# Patient Record
Sex: FEMALE | Race: BLACK OR AFRICAN AMERICAN | NOT HISPANIC OR LATINO | Employment: OTHER | URBAN - METROPOLITAN AREA
[De-identification: names, ages, dates, MRNs, and addresses within clinical notes are randomized per-mention and may not be internally consistent; named-entity substitution may affect disease eponyms.]

---

## 2023-05-19 ENCOUNTER — OFFICE VISIT (OUTPATIENT)
Dept: PRIMARY CARE | Facility: CLINIC | Age: 73
End: 2023-05-19
Payer: MEDICARE

## 2023-05-19 VITALS
BODY MASS INDEX: 34.86 KG/M2 | HEART RATE: 84 BPM | OXYGEN SATURATION: 96 % | DIASTOLIC BLOOD PRESSURE: 84 MMHG | WEIGHT: 216 LBS | SYSTOLIC BLOOD PRESSURE: 142 MMHG | RESPIRATION RATE: 17 BRPM | TEMPERATURE: 97.8 F

## 2023-05-19 DIAGNOSIS — Z96.651 STATUS POST RIGHT KNEE REPLACEMENT: ICD-10-CM

## 2023-05-19 DIAGNOSIS — I10 BENIGN ESSENTIAL HYPERTENSION: ICD-10-CM

## 2023-05-19 DIAGNOSIS — Z86.73 HISTORY OF TIA (TRANSIENT ISCHEMIC ATTACK): ICD-10-CM

## 2023-05-19 DIAGNOSIS — M54.50 ACUTE RIGHT-SIDED LOW BACK PAIN WITHOUT SCIATICA: ICD-10-CM

## 2023-05-19 DIAGNOSIS — M06.9 RHEUMATOID ARTHRITIS, INVOLVING UNSPECIFIED SITE, UNSPECIFIED WHETHER RHEUMATOID FACTOR PRESENT (MULTI): Primary | ICD-10-CM

## 2023-05-19 PROBLEM — Z86.010 HISTORY OF COLONIC POLYPS: Status: ACTIVE | Noted: 2021-11-12

## 2023-05-19 PROBLEM — M25.562 PAIN IN BOTH KNEES: Status: ACTIVE | Noted: 2023-05-19

## 2023-05-19 PROBLEM — E55.9 VITAMIN D DEFICIENCY: Status: ACTIVE | Noted: 2021-06-14

## 2023-05-19 PROBLEM — I25.10 ATHEROSCLEROSIS OF CORONARY ARTERY: Status: ACTIVE | Noted: 2023-05-19

## 2023-05-19 PROBLEM — G50.0 TRIGEMINAL NEURALGIA: Status: ACTIVE | Noted: 2023-05-19

## 2023-05-19 PROBLEM — M19.90 OSTEOARTHRITIS: Status: ACTIVE | Noted: 2021-06-11

## 2023-05-19 PROBLEM — M47.22 CERVICAL SPONDYLOSIS WITH RADICULOPATHY: Status: ACTIVE | Noted: 2023-05-19

## 2023-05-19 PROBLEM — I34.1 MVP (MITRAL VALVE PROLAPSE): Status: ACTIVE | Noted: 2023-05-19

## 2023-05-19 PROBLEM — K21.9 GERD (GASTROESOPHAGEAL REFLUX DISEASE): Status: ACTIVE | Noted: 2023-05-19

## 2023-05-19 PROBLEM — R07.89 ATYPICAL CHEST PAIN: Status: RESOLVED | Noted: 2023-05-19 | Resolved: 2023-05-19

## 2023-05-19 PROBLEM — M25.561 PAIN IN BOTH KNEES: Status: ACTIVE | Noted: 2023-05-19

## 2023-05-19 PROBLEM — M18.12 PRIMARY OSTEOARTHRITIS OF FIRST CARPOMETACARPAL JOINT OF LEFT HAND: Status: RESOLVED | Noted: 2018-02-15 | Resolved: 2023-05-19

## 2023-05-19 PROBLEM — G47.30 SLEEP APNEA: Status: ACTIVE | Noted: 2023-05-19

## 2023-05-19 PROBLEM — Z96.612 STATUS POST REVERSE TOTAL REPLACEMENT OF LEFT SHOULDER: Status: ACTIVE | Noted: 2020-06-15

## 2023-05-19 PROBLEM — Z86.0100 HISTORY OF COLONIC POLYPS: Status: ACTIVE | Noted: 2021-11-12

## 2023-05-19 PROBLEM — R06.2 WHEEZING: Status: RESOLVED | Noted: 2021-06-11 | Resolved: 2023-05-19

## 2023-05-19 PROBLEM — E78.2 MIXED HYPERLIPIDEMIA: Status: ACTIVE | Noted: 2022-09-25

## 2023-05-19 PROBLEM — G90.50 RSD (REFLEX SYMPATHETIC DYSTROPHY): Status: RESOLVED | Noted: 2023-05-19 | Resolved: 2023-05-19

## 2023-05-19 PROCEDURE — 1036F TOBACCO NON-USER: CPT | Performed by: FAMILY MEDICINE

## 2023-05-19 PROCEDURE — 3079F DIAST BP 80-89 MM HG: CPT | Performed by: FAMILY MEDICINE

## 2023-05-19 PROCEDURE — 3077F SYST BP >= 140 MM HG: CPT | Performed by: FAMILY MEDICINE

## 2023-05-19 PROCEDURE — 99214 OFFICE O/P EST MOD 30 MIN: CPT | Performed by: FAMILY MEDICINE

## 2023-05-19 RX ORDER — ALBUTEROL SULFATE 90 UG/1
2 AEROSOL, METERED RESPIRATORY (INHALATION)
COMMUNITY
Start: 2020-07-17

## 2023-05-19 RX ORDER — METOPROLOL TARTRATE 50 MG/1
50 TABLET ORAL 2 TIMES DAILY
COMMUNITY
Start: 2022-07-12 | End: 2023-10-23 | Stop reason: ALTCHOICE

## 2023-05-19 RX ORDER — ATORVASTATIN CALCIUM 40 MG/1
40 TABLET, FILM COATED ORAL DAILY
COMMUNITY
Start: 2022-06-03 | End: 2024-01-30 | Stop reason: SDUPTHER

## 2023-05-19 RX ORDER — LOSARTAN POTASSIUM 100 MG/1
100 TABLET ORAL DAILY
COMMUNITY
Start: 2019-10-15 | End: 2023-08-28 | Stop reason: SDUPTHER

## 2023-05-19 RX ORDER — FOLIC ACID 1 MG/1
1 TABLET ORAL DAILY
COMMUNITY

## 2023-05-19 RX ORDER — TIZANIDINE 2 MG/1
2 TABLET ORAL EVERY 6 HOURS PRN
COMMUNITY
Start: 2022-10-05 | End: 2023-10-23 | Stop reason: ALTCHOICE

## 2023-05-19 RX ORDER — HYDROXYCHLOROQUINE SULFATE 200 MG/1
1 TABLET, FILM COATED ORAL 2 TIMES DAILY
COMMUNITY
Start: 2022-05-17

## 2023-05-19 RX ORDER — CYCLOBENZAPRINE HCL 5 MG
5 TABLET ORAL NIGHTLY PRN
Qty: 10 TABLET | Refills: 0 | Status: SHIPPED | OUTPATIENT
Start: 2023-05-19 | End: 2023-10-23 | Stop reason: ALTCHOICE

## 2023-05-19 RX ORDER — METHOTREXATE 2.5 MG/1
2.5 TABLET ORAL
COMMUNITY

## 2023-05-19 RX ORDER — CARISOPRODOL 350 MG/1
350 TABLET ORAL 3 TIMES DAILY PRN
COMMUNITY
End: 2023-10-23 | Stop reason: ALTCHOICE

## 2023-05-19 RX ORDER — ASPIRIN 325 MG
50000 TABLET, DELAYED RELEASE (ENTERIC COATED) ORAL
COMMUNITY

## 2023-05-19 RX ORDER — NAPROXEN SODIUM 220 MG/1
81 TABLET, FILM COATED ORAL
COMMUNITY
Start: 2022-08-02

## 2023-05-19 RX ORDER — CELECOXIB 200 MG/1
200 CAPSULE ORAL DAILY
COMMUNITY
Start: 2021-11-23 | End: 2023-05-19 | Stop reason: SDUPTHER

## 2023-05-19 RX ORDER — MELOXICAM 15 MG/1
1 TABLET ORAL DAILY PRN
COMMUNITY
Start: 2022-07-15 | End: 2023-10-23 | Stop reason: ALTCHOICE

## 2023-05-19 RX ORDER — HYDROCHLOROTHIAZIDE 25 MG/1
25 TABLET ORAL DAILY
COMMUNITY
End: 2023-10-23 | Stop reason: SDUPTHER

## 2023-05-19 RX ORDER — GABAPENTIN 600 MG/1
600 TABLET ORAL
COMMUNITY
Start: 2019-06-12

## 2023-05-19 RX ORDER — CELECOXIB 200 MG/1
200 CAPSULE ORAL DAILY
Qty: 90 CAPSULE | Refills: 0 | Status: SHIPPED | OUTPATIENT
Start: 2023-05-19 | End: 2023-08-17

## 2023-05-19 RX ORDER — HYDROCODONE BITARTRATE AND ACETAMINOPHEN 5; 325 MG/1; MG/1
1 TABLET ORAL DAILY
COMMUNITY
Start: 2022-10-05 | End: 2023-10-23 | Stop reason: ALTCHOICE

## 2023-05-19 RX ORDER — KETOCONAZOLE 20 MG/G
CREAM TOPICAL 2 TIMES DAILY
COMMUNITY
End: 2023-10-23 | Stop reason: ALTCHOICE

## 2023-05-19 ASSESSMENT — ENCOUNTER SYMPTOMS
ARTHRALGIAS: 1
SPEECH DIFFICULTY: 0
NUMBNESS: 0
DIZZINESS: 0
FEVER: 0
SHORTNESS OF BREATH: 0
BACK PAIN: 1
CHILLS: 0

## 2023-05-19 NOTE — ASSESSMENT & PLAN NOTE
Patient will continue with physical therapy in [place] where her surgery took place.  Will request hospital records.

## 2023-05-19 NOTE — ASSESSMENT & PLAN NOTE
Likely 2/2 muscular strain.  Rx sent for muscle relaxer.  Follow up in 4-6 weeks or sooner as needed.  Discussed signs / symptoms that warrant presentation to the ED.

## 2023-05-19 NOTE — PROGRESS NOTES
I saw and evaluated the patient. I personally obtained the key and critical portions of the history and physical exam or was physically present for key and critical portions performed by the resident/fellow. I reviewed the resident/fellow's documentation and discussed the patient with the resident/fellow. I agree with the resident/fellow's medical decision making as documented in the note.  Patient states she was in [place] for her knee surgery.  Seen with her son.  She never did get to do physical therapy after his surgery.  Patient states while she is there she also had mini stroke/TIA.  She states she did not have a follow-up however they did all kinds of testing.  I kept her on Plavix for 20 days and then stopped she believes.  Patient's had no symptoms since.  They did not give her calls for.  Patient states he did a whole bunch of tests that they did not mention anything or follow-up with her on.  Waiting to get those records may need further testing.  She has seen neurology.  She needs to start physical therapy.  Heading back to [place] in 3 days.  Patient also states she was picking some up when she strained her back.  Like a muscle spasm.  No numbness tingling no bowel bladder dysfunction no weakness.  Patient states her muscles very tight and tender.  On exam neurovascular intact, tight paraspinal muscle.  Full range of motion.  Strength 5 out of 5.  We will treat her for muscle spasm.  Patient also not sleeping so we will try muscle relaxer.  Patient was on Soma.  Patient aware if any chest pain shortness of breath any nausea vomiting diarrhea fever headache any saddle anesthesia any bowel bladder dysfunction any numbness or weight please go to the ER  Side effects of medication explained to the patient  Follow-up in 1 to 2 weeks  Agree with assessment and plan  Jose Angel Ragland, DO

## 2023-05-19 NOTE — ASSESSMENT & PLAN NOTE
Per patient report, was hospitalized in [place] for TIA.   S/p 20 days plavix therapy.  Will continue strict monitoring of BP and lipids. Referral placed to neurology.  Discussed signs / symptoms that warrant presentation to the ED.  Will request outside hospital records.

## 2023-05-19 NOTE — PROGRESS NOTES
Subjective   Patient ID: Anita Calabrese is a 72 y.o. female who presents for Follow-up.    HPI     Had a right knee replacement in [place] March 14. States she also had a TIA while she was in [place] for which she was hospitalized last month. Took clopidogrel for 20 days and was advised to discontinue.  Feels she has been slow to recover from her surgery.  Will be going back to [place] this week to complete therapy for her knee.    Today, notes she is experiencing acute low back pain. Pain was sudden onset when she got to the office. States it is radiating up her back. Feels she is having muscle spasms.  Has to keep changing positions to get comfortable. No loss of bowel or bladder control.    Checks her blood pressure at home noting it is usually well controlled unless she is in pain.     Review of Systems   Constitutional:  Negative for chills and fever.   Respiratory:  Negative for shortness of breath.    Cardiovascular:  Negative for chest pain.   Musculoskeletal:  Positive for arthralgias and back pain.   Neurological:  Negative for dizziness, speech difficulty and numbness.   All other systems reviewed and are negative.      Objective   /84 (BP Location: Right arm, Patient Position: Sitting, BP Cuff Size: Large adult)   Pulse 84   Temp 36.6 °C (97.8 °F)   Resp 17   Wt 98 kg (216 lb)   SpO2 96%   BMI 34.86 kg/m²     Physical Exam  Vitals and nursing note reviewed.   Constitutional:       Appearance: She is not toxic-appearing.   Eyes:      Conjunctiva/sclera: Conjunctivae normal.   Cardiovascular:      Rate and Rhythm: Normal rate and regular rhythm.   Pulmonary:      Effort: Pulmonary effort is normal.      Breath sounds: Normal breath sounds.   Musculoskeletal:      Lumbar back: Tenderness (right lumbar paraspinals) present.      Comments: Ambulating with cane. Right knee effusion s/p replacement.   Skin:     General: Skin is warm and dry.   Neurological:      General: No focal deficit present.       Mental Status: She is alert.   Psychiatric:         Mood and Affect: Mood normal.         Assessment/Plan   Problem List Items Addressed This Visit          Circulatory    Benign essential hypertension     BP elevated in office today. Likely 2/2 acute back pain.  No chest pain, shortness of breath, headache, vision disturbance.  Continue to check BP at home. Call the office for elevated readings.  Continue medications are currently prescribed.  Discussed signs / symptoms that warrant presentation to the ED.            Other    Rheumatoid arthritis, involving unspecified site, unspecified whether rheumatoid factor present (CMS/[facility]) - Primary    Relevant Medications    CeleBREX 200 mg capsule    Acute right-sided low back pain without sciatica     Likely 2/2 muscular strain.  Rx sent for muscle relaxer.  Follow up in 4-6 weeks or sooner as needed.  Discussed signs / symptoms that warrant presentation to the ED.         Relevant Medications    cyclobenzaprine (Flexeril) 5 mg tablet    Status post right knee replacement     Patient will continue with physical therapy in [place] where her surgery took place.  Will request hospital records.         History of TIA (transient ischemic attack)     Per patient report, was hospitalized in [place] for TIA.   S/p 20 days plavix therapy.  Will continue strict monitoring of BP and lipids. Referral placed to neurology.  Discussed signs / symptoms that warrant presentation to the ED.  Will request outside hospital records.         Relevant Orders    Referral to Neurology     Discussed with attending physician.    Miriam Demarco,   PGY3

## 2023-05-19 NOTE — ASSESSMENT & PLAN NOTE
BP elevated in office today. Likely 2/2 acute back pain.  No chest pain, shortness of breath, headache, vision disturbance.  Continue to check BP at home. Call the office for elevated readings.  Continue medications are currently prescribed.  Discussed signs / symptoms that warrant presentation to the ED.

## 2023-05-26 ENCOUNTER — TELEPHONE (OUTPATIENT)
Dept: PRIMARY CARE | Facility: CLINIC | Age: 73
End: 2023-05-26

## 2023-05-26 NOTE — TELEPHONE ENCOUNTER
----- Message from Jose Angel Ragland DO sent at 5/26/2023 10:58 AM EDT -----  I received patient's records from her stay in [place].  Please assure she has a follow-up in the next few weeks

## 2023-08-28 DIAGNOSIS — I10 BENIGN ESSENTIAL HYPERTENSION: Primary | ICD-10-CM

## 2023-08-28 RX ORDER — LOSARTAN POTASSIUM 100 MG/1
100 TABLET ORAL DAILY
Qty: 90 TABLET | Refills: 1 | Status: SHIPPED | OUTPATIENT
Start: 2023-08-28 | End: 2024-03-26 | Stop reason: SDUPTHER

## 2023-10-23 ENCOUNTER — APPOINTMENT (OUTPATIENT)
Dept: RADIOLOGY | Facility: HOSPITAL | Age: 73
End: 2023-10-23
Payer: MEDICARE

## 2023-10-23 ENCOUNTER — HOSPITAL ENCOUNTER (EMERGENCY)
Facility: HOSPITAL | Age: 73
Discharge: HOME | End: 2023-10-23
Attending: STUDENT IN AN ORGANIZED HEALTH CARE EDUCATION/TRAINING PROGRAM
Payer: MEDICARE

## 2023-10-23 ENCOUNTER — OFFICE VISIT (OUTPATIENT)
Dept: PRIMARY CARE | Facility: CLINIC | Age: 73
End: 2023-10-23
Payer: MEDICARE

## 2023-10-23 VITALS
RESPIRATION RATE: 18 BRPM | DIASTOLIC BLOOD PRESSURE: 82 MMHG | TEMPERATURE: 94 F | BODY MASS INDEX: 36.57 KG/M2 | OXYGEN SATURATION: 94 % | HEIGHT: 67 IN | HEART RATE: 102 BPM | SYSTOLIC BLOOD PRESSURE: 152 MMHG | WEIGHT: 233 LBS

## 2023-10-23 VITALS
BODY MASS INDEX: 36.96 KG/M2 | RESPIRATION RATE: 18 BRPM | SYSTOLIC BLOOD PRESSURE: 168 MMHG | WEIGHT: 230 LBS | DIASTOLIC BLOOD PRESSURE: 85 MMHG | HEIGHT: 66 IN | OXYGEN SATURATION: 97 % | TEMPERATURE: 98.4 F | HEART RATE: 83 BPM

## 2023-10-23 DIAGNOSIS — I10 BENIGN ESSENTIAL HYPERTENSION: ICD-10-CM

## 2023-10-23 DIAGNOSIS — J18.9 PNEUMONIA DUE TO INFECTIOUS ORGANISM, UNSPECIFIED LATERALITY, UNSPECIFIED PART OF LUNG: Primary | ICD-10-CM

## 2023-10-23 DIAGNOSIS — Z86.73 HISTORY OF TIA (TRANSIENT ISCHEMIC ATTACK): ICD-10-CM

## 2023-10-23 DIAGNOSIS — N30.01 ACUTE CYSTITIS WITH HEMATURIA: ICD-10-CM

## 2023-10-23 DIAGNOSIS — R42 DIZZINESS: Primary | ICD-10-CM

## 2023-10-23 LAB
ALBUMIN SERPL BCP-MCNC: 4 G/DL (ref 3.4–5)
ALP SERPL-CCNC: 73 U/L (ref 33–136)
ALT SERPL W P-5'-P-CCNC: 18 U/L (ref 7–45)
ANION GAP SERPL CALC-SCNC: 10 MMOL/L (ref 10–20)
APPEARANCE UR: ABNORMAL
AST SERPL W P-5'-P-CCNC: 18 U/L (ref 9–39)
BASOPHILS # BLD AUTO: 0.06 X10*3/UL (ref 0–0.1)
BASOPHILS NFR BLD AUTO: 0.8 %
BILIRUB SERPL-MCNC: 0.4 MG/DL (ref 0–1.2)
BILIRUB UR STRIP.AUTO-MCNC: NEGATIVE MG/DL
BNP SERPL-MCNC: 25 PG/ML (ref 0–99)
BUN SERPL-MCNC: 25 MG/DL (ref 6–23)
CALCIUM SERPL-MCNC: 9.3 MG/DL (ref 8.6–10.3)
CARDIAC TROPONIN I PNL SERPL HS: 8 NG/L (ref 0–13)
CHLORIDE SERPL-SCNC: 106 MMOL/L (ref 98–107)
CO2 SERPL-SCNC: 29 MMOL/L (ref 21–32)
COLOR UR: YELLOW
CREAT SERPL-MCNC: 1.01 MG/DL (ref 0.5–1.05)
EOSINOPHIL # BLD AUTO: 0.11 X10*3/UL (ref 0–0.4)
EOSINOPHIL NFR BLD AUTO: 1.6 %
ERYTHROCYTE [DISTWIDTH] IN BLOOD BY AUTOMATED COUNT: 14.7 % (ref 11.5–14.5)
GFR SERPL CREATININE-BSD FRML MDRD: 58 ML/MIN/1.73M*2
GLUCOSE SERPL-MCNC: 89 MG/DL (ref 74–99)
GLUCOSE UR STRIP.AUTO-MCNC: NEGATIVE MG/DL
HCT VFR BLD AUTO: 37.9 % (ref 36–46)
HGB BLD-MCNC: 12 G/DL (ref 12–16)
HOLD SPECIMEN: NORMAL
IMM GRANULOCYTES # BLD AUTO: 0.08 X10*3/UL (ref 0–0.5)
IMM GRANULOCYTES NFR BLD AUTO: 1.1 % (ref 0–0.9)
INR PPP: 1 (ref 0.9–1.1)
KETONES UR STRIP.AUTO-MCNC: NEGATIVE MG/DL
LACTATE SERPL-SCNC: 0.6 MMOL/L (ref 0.4–2)
LEUKOCYTE ESTERASE UR QL STRIP.AUTO: ABNORMAL
LYMPHOCYTES # BLD AUTO: 2.19 X10*3/UL (ref 0.8–3)
LYMPHOCYTES NFR BLD AUTO: 31 %
MAGNESIUM SERPL-MCNC: 1.51 MG/DL (ref 1.6–2.4)
MCH RBC QN AUTO: 31.2 PG (ref 26–34)
MCHC RBC AUTO-ENTMCNC: 31.7 G/DL (ref 32–36)
MCV RBC AUTO: 98 FL (ref 80–100)
MONOCYTES # BLD AUTO: 0.51 X10*3/UL (ref 0.05–0.8)
MONOCYTES NFR BLD AUTO: 7.2 %
NEUTROPHILS # BLD AUTO: 4.11 X10*3/UL (ref 1.6–5.5)
NEUTROPHILS NFR BLD AUTO: 58.3 %
NITRITE UR QL STRIP.AUTO: NEGATIVE
NRBC BLD-RTO: 0 /100 WBCS (ref 0–0)
PH UR STRIP.AUTO: 5 [PH]
PLATELET # BLD AUTO: 261 X10*3/UL (ref 150–450)
PMV BLD AUTO: 10.1 FL (ref 7.5–11.5)
POTASSIUM SERPL-SCNC: 4 MMOL/L (ref 3.5–5.3)
PROT SERPL-MCNC: 7.2 G/DL (ref 6.4–8.2)
PROT UR STRIP.AUTO-MCNC: NEGATIVE MG/DL
PROTHROMBIN TIME: 11.5 SECONDS (ref 9.8–12.8)
RBC # BLD AUTO: 3.85 X10*6/UL (ref 4–5.2)
RBC # UR STRIP.AUTO: NEGATIVE /UL
RBC #/AREA URNS AUTO: NORMAL /HPF
SODIUM SERPL-SCNC: 141 MMOL/L (ref 136–145)
SP GR UR STRIP.AUTO: 1.02
SQUAMOUS #/AREA URNS AUTO: NORMAL /HPF
UROBILINOGEN UR STRIP.AUTO-MCNC: <2 MG/DL
WBC # BLD AUTO: 7.1 X10*3/UL (ref 4.4–11.3)
WBC #/AREA URNS AUTO: NORMAL /HPF

## 2023-10-23 PROCEDURE — 70450 CT HEAD/BRAIN W/O DYE: CPT | Mod: MG

## 2023-10-23 PROCEDURE — 36415 COLL VENOUS BLD VENIPUNCTURE: CPT | Performed by: STUDENT IN AN ORGANIZED HEALTH CARE EDUCATION/TRAINING PROGRAM

## 2023-10-23 PROCEDURE — 3077F SYST BP >= 140 MM HG: CPT

## 2023-10-23 PROCEDURE — 2500000002 HC RX 250 W HCPCS SELF ADMINISTERED DRUGS (ALT 637 FOR MEDICARE OP, ALT 636 FOR OP/ED): Performed by: STUDENT IN AN ORGANIZED HEALTH CARE EDUCATION/TRAINING PROGRAM

## 2023-10-23 PROCEDURE — 2500000001 HC RX 250 WO HCPCS SELF ADMINISTERED DRUGS (ALT 637 FOR MEDICARE OP): Performed by: STUDENT IN AN ORGANIZED HEALTH CARE EDUCATION/TRAINING PROGRAM

## 2023-10-23 PROCEDURE — 83880 ASSAY OF NATRIURETIC PEPTIDE: CPT | Performed by: STUDENT IN AN ORGANIZED HEALTH CARE EDUCATION/TRAINING PROGRAM

## 2023-10-23 PROCEDURE — 70450 CT HEAD/BRAIN W/O DYE: CPT | Performed by: RADIOLOGY

## 2023-10-23 PROCEDURE — 81001 URINALYSIS AUTO W/SCOPE: CPT | Performed by: STUDENT IN AN ORGANIZED HEALTH CARE EDUCATION/TRAINING PROGRAM

## 2023-10-23 PROCEDURE — 1036F TOBACCO NON-USER: CPT

## 2023-10-23 PROCEDURE — 83605 ASSAY OF LACTIC ACID: CPT | Performed by: STUDENT IN AN ORGANIZED HEALTH CARE EDUCATION/TRAINING PROGRAM

## 2023-10-23 PROCEDURE — 83735 ASSAY OF MAGNESIUM: CPT | Performed by: STUDENT IN AN ORGANIZED HEALTH CARE EDUCATION/TRAINING PROGRAM

## 2023-10-23 PROCEDURE — 87086 URINE CULTURE/COLONY COUNT: CPT | Mod: STJLAB | Performed by: STUDENT IN AN ORGANIZED HEALTH CARE EDUCATION/TRAINING PROGRAM

## 2023-10-23 PROCEDURE — 1159F MED LIST DOCD IN RCRD: CPT

## 2023-10-23 PROCEDURE — 96365 THER/PROPH/DIAG IV INF INIT: CPT

## 2023-10-23 PROCEDURE — 99214 OFFICE O/P EST MOD 30 MIN: CPT

## 2023-10-23 PROCEDURE — 80053 COMPREHEN METABOLIC PANEL: CPT | Performed by: STUDENT IN AN ORGANIZED HEALTH CARE EDUCATION/TRAINING PROGRAM

## 2023-10-23 PROCEDURE — 84484 ASSAY OF TROPONIN QUANT: CPT | Performed by: STUDENT IN AN ORGANIZED HEALTH CARE EDUCATION/TRAINING PROGRAM

## 2023-10-23 PROCEDURE — 99285 EMERGENCY DEPT VISIT HI MDM: CPT | Performed by: STUDENT IN AN ORGANIZED HEALTH CARE EDUCATION/TRAINING PROGRAM

## 2023-10-23 PROCEDURE — 71045 X-RAY EXAM CHEST 1 VIEW: CPT | Performed by: RADIOLOGY

## 2023-10-23 PROCEDURE — 2500000004 HC RX 250 GENERAL PHARMACY W/ HCPCS (ALT 636 FOR OP/ED): Performed by: STUDENT IN AN ORGANIZED HEALTH CARE EDUCATION/TRAINING PROGRAM

## 2023-10-23 PROCEDURE — 3079F DIAST BP 80-89 MM HG: CPT

## 2023-10-23 PROCEDURE — 99285 EMERGENCY DEPT VISIT HI MDM: CPT | Mod: 25

## 2023-10-23 PROCEDURE — 85610 PROTHROMBIN TIME: CPT | Performed by: STUDENT IN AN ORGANIZED HEALTH CARE EDUCATION/TRAINING PROGRAM

## 2023-10-23 PROCEDURE — 85025 COMPLETE CBC W/AUTO DIFF WBC: CPT | Performed by: STUDENT IN AN ORGANIZED HEALTH CARE EDUCATION/TRAINING PROGRAM

## 2023-10-23 PROCEDURE — 1126F AMNT PAIN NOTED NONE PRSNT: CPT

## 2023-10-23 PROCEDURE — 71045 X-RAY EXAM CHEST 1 VIEW: CPT

## 2023-10-23 PROCEDURE — 96366 THER/PROPH/DIAG IV INF ADDON: CPT

## 2023-10-23 PROCEDURE — 93000 ELECTROCARDIOGRAM COMPLETE: CPT | Performed by: FAMILY MEDICINE

## 2023-10-23 RX ORDER — MAGNESIUM SULFATE HEPTAHYDRATE 40 MG/ML
2 INJECTION, SOLUTION INTRAVENOUS ONCE
Status: COMPLETED | OUTPATIENT
Start: 2023-10-23 | End: 2023-10-23

## 2023-10-23 RX ORDER — SULFAMETHOXAZOLE AND TRIMETHOPRIM 800; 160 MG/1; MG/1
1 TABLET ORAL ONCE
Status: DISCONTINUED | OUTPATIENT
Start: 2023-10-23 | End: 2023-10-23

## 2023-10-23 RX ORDER — MECLIZINE HYDROCHLORIDE 25 MG/1
25 TABLET ORAL 3 TIMES DAILY PRN
Qty: 42 TABLET | Refills: 0 | Status: SHIPPED | OUTPATIENT
Start: 2023-10-23 | End: 2023-11-06

## 2023-10-23 RX ORDER — MECLIZINE HYDROCHLORIDE 25 MG/1
25 TABLET ORAL ONCE
Status: COMPLETED | OUTPATIENT
Start: 2023-10-23 | End: 2023-10-23

## 2023-10-23 RX ORDER — LEVOFLOXACIN 750 MG/1
750 TABLET ORAL DAILY
Qty: 14 TABLET | Refills: 0 | Status: SHIPPED | OUTPATIENT
Start: 2023-10-23 | End: 2023-11-06

## 2023-10-23 RX ORDER — DIAZEPAM 5 MG/1
5 TABLET ORAL ONCE
Status: COMPLETED | OUTPATIENT
Start: 2023-10-23 | End: 2023-10-23

## 2023-10-23 RX ORDER — LEVOFLOXACIN 750 MG/1
750 TABLET ORAL ONCE
Status: COMPLETED | OUTPATIENT
Start: 2023-10-23 | End: 2023-10-23

## 2023-10-23 RX ORDER — HYDROCHLOROTHIAZIDE 25 MG/1
50 TABLET ORAL DAILY
Qty: 60 TABLET | Refills: 1 | Status: SHIPPED | OUTPATIENT
Start: 2023-10-23 | End: 2023-10-30

## 2023-10-23 RX ADMIN — LEVOFLOXACIN 750 MG: 750 TABLET, FILM COATED ORAL at 15:42

## 2023-10-23 RX ADMIN — SODIUM CHLORIDE 500 ML: 9 INJECTION, SOLUTION INTRAVENOUS at 14:03

## 2023-10-23 RX ADMIN — MAGNESIUM SULFATE HEPTAHYDRATE 2 G: 40 INJECTION, SOLUTION INTRAVENOUS at 14:03

## 2023-10-23 RX ADMIN — DIAZEPAM 5 MG: 5 TABLET ORAL at 14:21

## 2023-10-23 RX ADMIN — MECLIZINE HYDROCHLORIDE 25 MG: 25 TABLET ORAL at 13:08

## 2023-10-23 ASSESSMENT — PAIN - FUNCTIONAL ASSESSMENT: PAIN_FUNCTIONAL_ASSESSMENT: 0-10

## 2023-10-23 ASSESSMENT — PAIN DESCRIPTION - LOCATION: LOCATION: BACK

## 2023-10-23 ASSESSMENT — PAIN DESCRIPTION - ORIENTATION: ORIENTATION: LOWER

## 2023-10-23 ASSESSMENT — COLUMBIA-SUICIDE SEVERITY RATING SCALE - C-SSRS
1. IN THE PAST MONTH, HAVE YOU WISHED YOU WERE DEAD OR WISHED YOU COULD GO TO SLEEP AND NOT WAKE UP?: NO
2. HAVE YOU ACTUALLY HAD ANY THOUGHTS OF KILLING YOURSELF?: NO
6. HAVE YOU EVER DONE ANYTHING, STARTED TO DO ANYTHING, OR PREPARED TO DO ANYTHING TO END YOUR LIFE?: NO

## 2023-10-23 ASSESSMENT — PAIN SCALES - GENERAL
PAINLEVEL_OUTOF10: 3
PAINLEVEL_OUTOF10: 0 - NO PAIN

## 2023-10-23 NOTE — ASSESSMENT & PLAN NOTE
[place]-Hallpike negative.  Orthostatic vital signs are negative.  EKG shows Q waves in inferior leads, unclear if this is a current or old infarct.  Patient denies any chest pain, shortness of breath, palpitations.  No other arrhythmias seen. I am concerned for possible stroke as this is a 73-year-old female with new onset dizziness, questionable blurry vision, and symptoms that are not consistent with BPPV or orthostatic hypotension, she has longstanding poorly controlled hypertension, and a history of TIA earlier this year.  Recommended that patient present to the emergency department urgently for stat head CT and possible admission for brain MRI.  Patient is in agreement with the plan.  We will also send in meclizine for her and I want her to increase her HCTZ to 50 mg once daily in the morning.  She should start checking her blood pressures at home. Follow-up within a few days of leaving the hospital.

## 2023-10-23 NOTE — PROGRESS NOTES
I saw and evaluated the patient. I personally obtained the key and critical portions of the history and physical exam or was physically present for key and critical portions performed by the resident/fellow. I reviewed the resident/fellow's documentation and discussed the patient with the resident/fellow. I agree with the resident/fellow's medical decision making as documented in the note.  Patient states she began getting dizzy after walking in the air.  She has not been have any chest pain or shortness of breath.  Her vision has changed.  Patient states she has had vertigo in the past.  She feels like this may be close to it.  However there is been some vision changes.  Patient again states she started and dizzy after walking in.  Patient states certain times she can even drive.  This will come and go.  Patient had history of TIA.  Patient blood pressure still very elevated.  To the patient's history, patient was sent to the ER.  She is aware of this.  Declined squad.  States she could drive there.  She will likely need CT of her head.  If this is negative and monitoring of her heart hopefully this is just vertical.  Patient's can follow-up in 1 week  Agree with assessment and plan  Jose Angel Ragland, DO

## 2023-10-23 NOTE — ED PROVIDER NOTES
HPI   Chief Complaint   Patient presents with    Shortness of Breath     Sent by PCP       This patient has a past medical history of hypertension presenting to the ED after being referred to by PCP for persistent dizziness, feeling the room spinning intermittent lasting hours over the last 1.5 weeks, along with worsening dyspnea on exertion over the last several weeks.  She was sent by PCP due to concerns for persistent dizziness.  At time of exam, she denies any chest pain, but reports shortness of breath without cough.  No nausea or vomiting.  She reports feelings of the room spinning, the last hours, no provoking or relieving factors.  She currently denies any symptoms of headache.      Laboratory studies notable for some hypomagnesemia, which I did replete, CBC and CMP grossly unremarkable.  Urinalysis demonstrates evidence of UTI, chest x-ray demonstrates some bibasilar streaky densities, infiltrates versus scarring.  She does report some shortness of breath and with a negative troponin and negative BNP, pneumonia is higher on the differential.  Will order for Levaquin during her stay in the ED, patient was a dose with meclizine once with improvement of symptoms mildly.  Still persistent, we ordered for Valium, and after this reports resolution of symptoms, feels significantly better, CT scan of head not demonstrating any acute intracranial pathology.    She will be discharged home with outpatient PCP follow-up, prescription for Levaquin    To cover for urinary tract infection and pneumonia    Discussed with the attending  Lico Knox DO PGY-4  Emergency Medicine                          [place] Coma Scale Score: 15                  Patient History   Past Medical History:   Diagnosis Date    Atypical chest pain 05/19/2023    Primary osteoarthritis of first carpometacarpal joint of left hand 02/15/2018    RSD (reflex sympathetic dystrophy) 05/19/2023    Wheezing 06/11/2021     Past Surgical History:    Procedure Laterality Date    OTHER SURGICAL HISTORY  2019    Shoulder surgery    OTHER SURGICAL HISTORY  2019    Knee replacement    OTHER SURGICAL HISTORY  2019     section    OTHER SURGICAL HISTORY  2019    Hysterectomy     No family history on file.  Social History     Tobacco Use    Smoking status: Former     Types: Cigarettes     Quit date: 2006     Years since quittin.8    Smokeless tobacco: Never   Vaping Use    Vaping Use: Never used   Substance Use Topics    Alcohol use: Not Currently    Drug use: Never       Physical Exam   ED Triage Vitals   Temp Heart Rate Resp BP   10/23/23 1233 10/23/23 1233 10/23/23 1233 10/23/23 1235   36.9 °C (98.4 °F) 94 17 (!) 196/92      SpO2 Temp Source Heart Rate Source Patient Position   10/23/23 1233 10/23/23 1233 -- --   96 % Temporal        BP Location FiO2 (%)     -- --             Physical Exam  Constitutional:       Appearance: Normal appearance.   HENT:      Head: Normocephalic and atraumatic.      Mouth/Throat:      Mouth: Mucous membranes are moist.   Eyes:      Extraocular Movements: Extraocular movements intact.   Cardiovascular:      Rate and Rhythm: Normal rate and regular rhythm.      Heart sounds: Normal heart sounds. No murmur heard.  Pulmonary:      Effort: Pulmonary effort is normal. No respiratory distress.      Breath sounds: Normal breath sounds. No wheezing.   Abdominal:      General: There is no distension.      Palpations: Abdomen is soft.      Tenderness: There is no abdominal tenderness. There is no guarding.   Musculoskeletal:      Right lower leg: No edema.      Left lower leg: No edema.   Skin:     General: Skin is warm and dry.   Neurological:      General: No focal deficit present.      Mental Status: She is alert and oriented to person, place, and time.   Psychiatric:         Mood and Affect: Mood normal.         Behavior: Behavior normal.         ED Course & MDM   ED Course as of 10/23/23 1536   Mon Oct  23, 2023   1509 Dosed with Valium p.o., reports improvement of symptoms after this.  Urine demonstrating evidence of UTI.  She wants to be discharged. [VH]      ED Course User Index  [VH] Lico Knox DO         Diagnoses as of 10/23/23 1536   Pneumonia due to infectious organism, unspecified laterality, unspecified part of lung   Acute cystitis with hematuria       Medical Decision Making  Listed the patient, she felt significantly dizzy during that time, she could not let go of the bed while standing.  Plan on obtaining CT scan of head, basic blood work.    EKG interpreted by myself: Sinus rhythm, ventricular rate 84, normal axis, QTc 458 ms, no acute injury pattern noted.        Procedure  Procedures     Lico Knox DO  Resident  10/23/23 1536

## 2023-10-23 NOTE — PROGRESS NOTES
"Subjective   Anita Calabrese is a 73 y.o. female who presents for Vertigo (Vertigo on and off. It usually happens when she wakes up. Last couple weeks she feels like she has to be still.)  Patient has had 1 to 2 weeks of intermittent dizziness where she feels that the room is spinning.  She had this same sensation years ago, reportedly was on medicine, and it went away.  She cannot define what triggers these.  It is not worse with for standing up or when moving around too quickly.  She does endorse some blurry vision that she is not sure if it is just due to the spinning sensation.  Denies double vision, headache, hearing loss, panic attacks, chest pain, shortness of breath, palpitations.  She did start a new over-the-counter supplement a few weeks ago for sciatica that has been vitamins and some other homeopathic remedies since she is not sure if this is contributing to her symptoms or not.  She did have a TIA in April in [place] but all the details are not clear.  Her blood pressure has been running high and she is on losartan 100 mg daily and was supposed to be on HCTZ 25 mg once daily, but patient says she is taking it twice a day.    Objective   /82 (BP Location: Left arm, Patient Position: Standing, BP Cuff Size: Large adult)   Pulse 102   Temp 34.4 °C (94 °F)   Resp 18   Ht 1.689 m (5' 6.5\")   Wt 106 kg (233 lb)   SpO2 94%   BMI 37.04 kg/m²    PHYSICAL EXAM  Gen: Well appearing, in NAD  Eyes: EOMI  HEENT: TMs normal  Heart: RRR, no murmurs  Lungs: No increased work of breathing, CTAB, on RA  Extremities: WWP, cap refill <2sec, no pitting edema in LE b/l  Neuro: Alert, symmetrical facies, moves all extremities equally, Cranial nerves II through XII intact, no focal neurological deficits, [place]-Hallpike negative  Psych: Appropriate mood and affect    Assessment/Plan     Problem List Items Addressed This Visit       Benign essential hypertension    Relevant Medications    hydroCHLOROthiazide " (HYDRODiuril) 25 mg tablet    History of TIA (transient ischemic attack)    Dizziness - Primary     [place]-Hallpike negative.  Orthostatic vital signs are negative.  EKG shows Q waves in inferior leads, unclear if this is a current or old infarct.  Patient denies any chest pain, shortness of breath, palpitations.  No other arrhythmias seen. I am concerned for possible stroke as this is a 73-year-old female with new onset dizziness, questionable blurry vision, and symptoms that are not consistent with BPPV or orthostatic hypotension, she has longstanding poorly controlled hypertension, and a history of TIA earlier this year.  Recommended that patient present to the emergency department urgently for stat head CT and possible admission for brain MRI.  Patient is in agreement with the plan.  We will also send in meclizine for her and I want her to increase her HCTZ to 50 mg once daily in the morning.  She should start checking her blood pressures at home. Follow-up within a few days of leaving the hospital.         Relevant Medications    meclizine (Antivert) 25 mg tablet    Other Relevant Orders    ECG 12 lead (Clinic Performed)      Kamilah Osei DO  Family Medicine Resident, PGY-3  [facility]  [address] [place] [address] [phone]

## 2023-10-24 LAB — BACTERIA UR CULT: NORMAL

## 2023-10-25 ENCOUNTER — HOSPITAL ENCOUNTER (OUTPATIENT)
Dept: CARDIOLOGY | Facility: HOSPITAL | Age: 73
Discharge: HOME | End: 2023-10-25
Payer: MEDICARE

## 2023-10-25 LAB
ATRIAL RATE: 84 BPM
P AXIS: 37 DEGREES
P OFFSET: 189 MS
P ONSET: 126 MS
PR INTERVAL: 182 MS
Q ONSET: 217 MS
QRS COUNT: 14 BEATS
QRS DURATION: 90 MS
QT INTERVAL: 388 MS
QTC CALCULATION(BAZETT): 458 MS
QTC FREDERICIA: 433 MS
R AXIS: -3 DEGREES
T AXIS: 61 DEGREES
T OFFSET: 411 MS
VENTRICULAR RATE: 84 BPM

## 2023-10-25 PROCEDURE — 93005 ELECTROCARDIOGRAM TRACING: CPT

## 2023-10-30 RX ORDER — HYDROCHLOROTHIAZIDE 25 MG/1
TABLET ORAL
Qty: 180 TABLET | Refills: 1 | Status: SHIPPED | OUTPATIENT
Start: 2023-10-30

## 2023-10-31 ENCOUNTER — APPOINTMENT (OUTPATIENT)
Dept: PRIMARY CARE | Facility: CLINIC | Age: 73
End: 2023-10-31
Payer: MEDICARE

## 2023-11-16 ENCOUNTER — OFFICE VISIT (OUTPATIENT)
Dept: CARDIOLOGY | Facility: CLINIC | Age: 73
End: 2023-11-16
Payer: MEDICARE

## 2023-11-16 VITALS
SYSTOLIC BLOOD PRESSURE: 104 MMHG | WEIGHT: 230 LBS | BODY MASS INDEX: 36.96 KG/M2 | DIASTOLIC BLOOD PRESSURE: 58 MMHG | HEART RATE: 100 BPM | OXYGEN SATURATION: 92 % | HEIGHT: 66 IN

## 2023-11-16 DIAGNOSIS — R06.09 DOE (DYSPNEA ON EXERTION): Primary | ICD-10-CM

## 2023-11-16 PROCEDURE — 3074F SYST BP LT 130 MM HG: CPT | Performed by: NURSE PRACTITIONER

## 2023-11-16 PROCEDURE — 1036F TOBACCO NON-USER: CPT | Performed by: NURSE PRACTITIONER

## 2023-11-16 PROCEDURE — 99215 OFFICE O/P EST HI 40 MIN: CPT | Performed by: NURSE PRACTITIONER

## 2023-11-16 PROCEDURE — 1126F AMNT PAIN NOTED NONE PRSNT: CPT | Performed by: NURSE PRACTITIONER

## 2023-11-16 PROCEDURE — 1159F MED LIST DOCD IN RCRD: CPT | Performed by: NURSE PRACTITIONER

## 2023-11-16 PROCEDURE — 3078F DIAST BP <80 MM HG: CPT | Performed by: NURSE PRACTITIONER

## 2023-11-16 PROCEDURE — 1160F RVW MEDS BY RX/DR IN RCRD: CPT | Performed by: NURSE PRACTITIONER

## 2023-11-16 NOTE — PROGRESS NOTES
Name : Anita Calabrese   : 1950   MRN : [id number]   ENC Date : 2023    CC: Annual CV exam     HPI:    Ms Calabrese is a 73-year-old obese AA female with a history of hypertension, dyslipidemia, coronary artery disease based on CT scan of the chest, Former 40 pack year smoker & COPD who presents today for annual cardiovascular follow up.     Hospital course:  She presented to [facility] ED on  with c/o chest pain. Pain was described as retrosternal with a heaviness/pressure sensation, worse with exertion and improved with rest. She has an extensive family history of coronary artery disease with her father having an MI at 42 and a second MI age 62 and . Troponin x 2 negative. She remained stable throughout her hospital stay. No events recorded on telemetry and no ischemic changes on EKGs. She was evaluated by cardiology who recommended outpatient stress test for her atypical angina. Her ASCVD was calculated at 12.4% indicating she should be on a high intensity statin. She was prescribed atorvastatin 40 mg once a day & d/c'd home.     She had a lexiscan on 2022 which showed Normal myocardial perfusion, no e/o ischemia or infarct, LVEF 65%.     C/o SORIA today. Can't get from her bathroom to her bed without SOB. Couldn't put a load of laundry in today. Reports she can't talk for needing to catch her breath though she does not appear to be having conversational dyspnea. No LE edema, no JVD, lungs sound pretty clear. Recent ED visit in October for pneumonia. Reports Knee replacement in March & sedentary. Didn't want EKG done in office as just had one done. BP stable, sats 92%. Given symptoms, recommend transfer to ED for eval- COPD exacerbation, r/o PE, or cardiac cause, but I don't think this is cardiac. She wanted to drive herself over to the ED, would not let me take her via wheelchair.     ROS: unless otherwise noted in the history of present illness, all other systems were reviewed and they are  negative for complaints     Allergies:  Azithromycin, Penicillins, and Salicylates    Current Outpatient Medications   Medication Instructions    albuterol 90 mcg/actuation inhaler 2 puffs, inhalation, 4 times daily RT    aspirin 81 mg, oral, Daily RT    atorvastatin (LIPITOR) 40 mg, oral, Daily    cholecalciferol (VITAMIN D-3) 50,000 Units, oral, Weekly    folic acid (FOLVITE) 1 mg, oral, Daily    gabapentin (NEURONTIN) 600 mg    hydroCHLOROthiazide (HYDRODiuril) 25 mg tablet TAKE 2 TABLETS(50 MG) BY MOUTH EVERY DAY    hydroxychloroquine (Plaquenil) 200 mg tablet 1 tablet, oral, 2 times daily    losartan (COZAAR) 100 mg, oral, Daily    methotrexate (TREXALL) 2.5 mg, oral, Weekly        Last Labs:  CBC  Lab Results   Component Value Date    WBC 7.1 10/23/2023    HGB 12.0 10/23/2023    HCT 37.9 10/23/2023    MCV 98 10/23/2023     10/23/2023       CMP  Lab Results   Component Value Date    CALCIUM 9.3 10/23/2023    PROT 7.2 10/23/2023    ALBUMIN 4.0 10/23/2023    AST 18 10/23/2023    ALT 18 10/23/2023    ALKPHOS 73 10/23/2023    BILITOT 0.4 10/23/2023       BMP   Lab Results   Component Value Date     10/23/2023    K 4.0 10/23/2023     10/23/2023    CO2 29 10/23/2023    GLUCOSE 89 10/23/2023    BUN 25 (H) 10/23/2023    CREATININE 1.01 10/23/2023       LIPID PANEL   Lab Results   Component Value Date    CHOL 161 06/01/2022    TRIG 125 06/01/2022    HDL 62.0 06/01/2022    CHHDL 2.6 06/01/2022    LDLF 74 06/01/2022    VLDL 25 06/01/2022       RENAL FUNCTION PANEL   Lab Results   Component Value Date    GLUCOSE 89 10/23/2023     10/23/2023    K 4.0 10/23/2023     10/23/2023    CO2 29 10/23/2023    ANIONGAP 10 10/23/2023    BUN 25 (H) 10/23/2023    CREATININE 1.01 10/23/2023    CALCIUM 9.3 10/23/2023    ALBUMIN 4.0 10/23/2023        Lab Results   Component Value Date    BNP 25 10/23/2023    HGBA1C 5.4 07/05/2022       Last Recorded Vitals:  Vitals:    11/16/23 1347   BP: 104/58   BP Location:  "Left arm   Patient Position: Sitting   Pulse: 100   SpO2: 92%   Weight: 104 kg (230 lb)   Height: 1.676 m (5' 6\")       Physical Exam:  On exam Ms. Calabrese appears her stated age, is alert and oriented x3, and in no acute distress. Her sclera are anicteric and her oropharynx has moist mucous membranes. Her neck is supple and without thyromegaly. The JVP is ~5 cm of water above the right atrium. Her cardiac exam has regular rhythm, normal S1, S2. No S3/4. There are no murmurs. Her lungs are clear to auscultation bilaterally and there is no dullness to percussion. Her abdomen is soft, nontender with normoactive bowel sounds. There is no HJR. The extremities are warm and without edema. The skin is dry. There is no rash present. The distal pulses are 2-3+ in all four extremities. Her mood and affect are appropriate for todays encounter.     Assessment/Plan:  SORIA. Minimal activity & she becomes SOB. SPO2 92%. Reports conversational dyspnea as we are talking. No LE edema, no JVD, Lungs are clear. Recent PNA. Knee replacement in March. Known CAD. Given severity of her SOB advise evaluation in the ED.     Advised Ms McClinton call me after evaluation in ED to determine follow up appointment with me.    Tracy M Schwab, APRN-CNP    "

## 2024-01-03 ENCOUNTER — OFFICE VISIT (OUTPATIENT)
Dept: PRIMARY CARE | Facility: CLINIC | Age: 74
End: 2024-01-03
Payer: MEDICARE

## 2024-01-03 ENCOUNTER — ANCILLARY PROCEDURE (OUTPATIENT)
Dept: RADIOLOGY | Facility: CLINIC | Age: 74
End: 2024-01-03
Payer: MEDICARE

## 2024-01-03 VITALS
OXYGEN SATURATION: 97 % | RESPIRATION RATE: 18 BRPM | HEART RATE: 87 BPM | TEMPERATURE: 97.4 F | SYSTOLIC BLOOD PRESSURE: 151 MMHG | DIASTOLIC BLOOD PRESSURE: 84 MMHG

## 2024-01-03 DIAGNOSIS — J44.1 CHRONIC OBSTRUCTIVE PULMONARY DISEASE WITH ACUTE EXACERBATION (MULTI): ICD-10-CM

## 2024-01-03 DIAGNOSIS — J44.1 CHRONIC OBSTRUCTIVE PULMONARY DISEASE WITH ACUTE EXACERBATION (MULTI): Primary | ICD-10-CM

## 2024-01-03 PROCEDURE — 71046 X-RAY EXAM CHEST 2 VIEWS: CPT

## 2024-01-03 PROCEDURE — 1126F AMNT PAIN NOTED NONE PRSNT: CPT

## 2024-01-03 PROCEDURE — 99214 OFFICE O/P EST MOD 30 MIN: CPT

## 2024-01-03 PROCEDURE — 3079F DIAST BP 80-89 MM HG: CPT

## 2024-01-03 PROCEDURE — 1036F TOBACCO NON-USER: CPT

## 2024-01-03 PROCEDURE — 1159F MED LIST DOCD IN RCRD: CPT

## 2024-01-03 PROCEDURE — 71046 X-RAY EXAM CHEST 2 VIEWS: CPT | Performed by: RADIOLOGY

## 2024-01-03 PROCEDURE — 3077F SYST BP >= 140 MM HG: CPT

## 2024-01-03 RX ORDER — DOXYCYCLINE 100 MG/1
100 CAPSULE ORAL 2 TIMES DAILY
Qty: 14 CAPSULE | Refills: 0 | Status: SHIPPED | OUTPATIENT
Start: 2024-01-03 | End: 2024-01-10

## 2024-01-03 RX ORDER — DOXYCYCLINE 100 MG/1
100 CAPSULE ORAL 2 TIMES DAILY
Qty: 14 CAPSULE | Refills: 0 | Status: SHIPPED | OUTPATIENT
Start: 2024-01-03 | End: 2024-01-03 | Stop reason: SDUPTHER

## 2024-01-03 RX ORDER — PREDNISONE 20 MG/1
TABLET ORAL
Qty: 18 TABLET | Refills: 0 | Status: SHIPPED | OUTPATIENT
Start: 2024-01-03 | End: 2024-01-03 | Stop reason: SDUPTHER

## 2024-01-03 RX ORDER — PREDNISONE 20 MG/1
TABLET ORAL
Qty: 18 TABLET | Refills: 0 | Status: SHIPPED | OUTPATIENT
Start: 2024-01-03 | End: 2024-01-11

## 2024-01-03 ASSESSMENT — ENCOUNTER SYMPTOMS
SORE THROAT: 1
PALPITATIONS: 0
NECK PAIN: 0
FLANK PAIN: 0
HEADACHES: 0
NECK STIFFNESS: 0
LIGHT-HEADEDNESS: 0
DIZZINESS: 0
WHEEZING: 1
BACK PAIN: 1
SHORTNESS OF BREATH: 1
DYSURIA: 0
DIAPHORESIS: 0
FREQUENCY: 1
VOICE CHANGE: 1
COUGH: 1
CHILLS: 0
SINUS PRESSURE: 0
FEVER: 0
RHINORRHEA: 0

## 2024-01-03 NOTE — PROGRESS NOTES
Subjective   Anita Calabrese is a 73 y.o. female who presents for Cough, Shortness of Breath, and Back Pain (Pt here today for cough, SOB, and severe back pain for about 2 weeks).    HPI    Patient is presenting to the office today with complaint of cough, shortness of breath, and low back pain for the last 2 weeks.  Patient states that she has had low back pain for over a year now but it seems to have worsened with the cold weather change.  She was previously doing water therapy which was helpful but did not like swimming and then being exposed to the cold environment afterwards and fear that it would cause a pneumonia.  Patient is interested in resuming once the weather is warm again.  The cough is nonproductive, does not wake her up in the middle of the night, is not worse after eating or upon awakening.  It is consistent throughout the day. Shortness of breath accompanies the cough and she endorses sometimes wheezing.  She has an albuterol inhaler which she has been using frequently.  She was previously prescribed a maintenance inhaler but did not pick it up due to the expensive cost.  The patient's daughter accompanies her today and states that she wanted her mom to get evaluated the emergency department around [place] but patient refused.  She has concern for how short of breath she gets on walking, is concerned that patient has a pneumonia.  She was treated for pneumonia in October 2023.  Patient denies any current fever, chills, sputum production.  She is had a sore throat that is improved since beginning of her illness with really only chest congestion and cough still here.  She did not do a COVID test.  1 also around her was visibly sick.  Patient was a cigarette smoker but quit in 2006.  There have been mentions of COPD but she has not been evaluated by pulmonologist.  She does have an appointment coming up at the end of this week with a pulmonologist through [facility] in [place].   Patient is also followed with cardiology for concerns of coronary artery disease.  At her most recent appointment in November she was encouraged to get evaluation in the ED for dyspnea on exertion.  Patient states that when she was in [place] this past year she required home oxygen supplementation after her hospitalization.  She does not remember feeling short of breath or the reason at that time.  When she came back to [place] she did not have oxygen.  Patient does not think she has diagnosis of heart failure at this time.  Her last echocardiogram was in 2022 for which there was a left ventricle ejection fraction of 70%.  Patient is endorsing some lower extremity edema that is worsened over the last month or so which was not an issue prior to this occurrence.                Review of Systems   Constitutional:  Negative for chills, diaphoresis and fever.   HENT:  Positive for sore throat and voice change. Negative for congestion, ear discharge, ear pain, postnasal drip, rhinorrhea, sinus pressure and sneezing.    Respiratory:  Positive for cough, shortness of breath and wheezing.    Cardiovascular:  Positive for leg swelling. Negative for chest pain and palpitations.   Genitourinary:  Positive for frequency and urgency. Negative for dysuria and flank pain.   Musculoskeletal:  Positive for back pain. Negative for neck pain and neck stiffness.   Neurological:  Negative for dizziness, syncope, light-headedness and headaches.   All other systems reviewed and are negative.      Objective   Physical Exam  Vitals and nursing note reviewed.   Constitutional:       General: She is not in acute distress.     Appearance: Normal appearance. She is not ill-appearing or diaphoretic.   HENT:      Head: Normocephalic and atraumatic.      Mouth/Throat:      Mouth: Mucous membranes are moist.      Pharynx: No oropharyngeal exudate or posterior oropharyngeal erythema.   Eyes:      Conjunctiva/sclera: Conjunctivae normal.    Cardiovascular:      Rate and Rhythm: Normal rate and regular rhythm.      Pulses: Normal pulses.   Pulmonary:      Effort: Pulmonary effort is normal.      Breath sounds: No wheezing, rhonchi or rales.      Comments: Decreased lung sounds, decreased air movement on auscultation  Abdominal:      Tenderness: There is no right CVA tenderness or left CVA tenderness.   Musculoskeletal:      Cervical back: Normal range of motion and neck supple.      Right lower leg: Edema present.      Left lower leg: Edema present.   Skin:     General: Skin is warm and dry.      Capillary Refill: Capillary refill takes less than 2 seconds.   Neurological:      General: No focal deficit present.      Mental Status: She is alert and oriented to person, place, and time. Mental status is at baseline.   Psychiatric:         Mood and Affect: Mood normal.         Thought Content: Thought content normal.         Assessment/Plan     This is a 73-year-old female with past medical history of CAD, HTN, HLD, former cigarette smoker, unspecified COPD who presents to the clinic today for cough, shortness of breath, and low back pain for the last 2 weeks.  I am concerned for possible COPD exacerbation due to patient's requirement of albuterol inhaler.  We will treat with antibiotics and steroid taper. Patient is scheduled to see pulmonologist on 1/5/2024 for which she can undergo further pulmonary function testing.  Patient may not require baseline oxygen or other maintenance inhaler treatment.  There is also some concern for underlying heart failure with the new finding of lower extremity edema and dyspnea on exertion.  Patient has not had an echocardiogram since 2022.  She follows with cardiology and was encouraged to follow-up with them again as soon as possible. Patient will follow-up in 1 month after her appointments with pulmonology.  I will discuss any results found on chest x-ray over the telephone.  Discussed symptoms that would be  concerning for patient that would warrant evaluation in the emergency department.  Patient is agreeable to plan.         Problem List Items Addressed This Visit    None  Visit Diagnoses       Chronic obstructive pulmonary disease with acute exacerbation (CMS/HCC)    -  Primary    Relevant Medications    doxycycline (Vibramycin) 100 mg capsule    predniSONE (Deltasone) 20 mg tablet    Other Relevant Orders    XR chest 2 views (Completed)    Follow Up In [facility] - PCP - Established          Patient was seen and discussed with attending physician.     Janina Ty DO  Family Medicine Resident, PGY-1  [facility]  [address] [place] [address] [phone]     This note has been transcribed using Dragon voice recognition system and there is a possibility of unintentional typing misprints.  Any information found to be copied from previous providers is done in the best interest of the patient to provide accurate, quality, and continuity of care.

## 2024-01-03 NOTE — RESULT ENCOUNTER NOTE
Called patient with chest x-ray results.  There is no obvious sign of active pneumonia or pleural effusion.  No other active cardiopulmonary disease.  Largely unchanged from last x-ray.  Will continue current treatment for COPD exacerbation and patient will follow-up with pulmonology on 1/5 as planned.

## 2024-01-04 NOTE — PROGRESS NOTES
I saw and evaluated the patient. I personally obtained the key and critical portions of the history and physical exam or was physically present for key and critical portions performed by the resident/fellow. I reviewed the resident/fellow's documentation and discussed the patient with the resident/fellow. I agree with the resident/fellow's medical decision making as documented in the note.    COPD exacerbation, agree with prednisone , CXR, doxycycline and close follow up with pulmonology in two days.  Follow up if no improvement or if symptoms worsen.  Proceed to the nearest emergency department if condition worsens significantly/becomes severe in nature.       Marty Louise, DO

## 2024-01-26 RX ORDER — NITROGLYCERIN 0.4 MG/1
0.4 TABLET SUBLINGUAL EVERY 5 MIN PRN
COMMUNITY
Start: 2024-01-18

## 2024-01-30 ENCOUNTER — OFFICE VISIT (OUTPATIENT)
Dept: CARDIOLOGY | Facility: CLINIC | Age: 74
End: 2024-01-30
Payer: MEDICARE

## 2024-01-30 VITALS
RESPIRATION RATE: 18 BRPM | HEART RATE: 68 BPM | OXYGEN SATURATION: 96 % | BODY MASS INDEX: 36.47 KG/M2 | HEIGHT: 67 IN | WEIGHT: 232.4 LBS | SYSTOLIC BLOOD PRESSURE: 124 MMHG | DIASTOLIC BLOOD PRESSURE: 64 MMHG

## 2024-01-30 DIAGNOSIS — R06.02 SHORTNESS OF BREATH: Primary | ICD-10-CM

## 2024-01-30 DIAGNOSIS — R06.09 DOE (DYSPNEA ON EXERTION): ICD-10-CM

## 2024-01-30 PROCEDURE — 1036F TOBACCO NON-USER: CPT | Performed by: NURSE PRACTITIONER

## 2024-01-30 PROCEDURE — 3078F DIAST BP <80 MM HG: CPT | Performed by: NURSE PRACTITIONER

## 2024-01-30 PROCEDURE — 1159F MED LIST DOCD IN RCRD: CPT | Performed by: NURSE PRACTITIONER

## 2024-01-30 PROCEDURE — 3074F SYST BP LT 130 MM HG: CPT | Performed by: NURSE PRACTITIONER

## 2024-01-30 PROCEDURE — 99214 OFFICE O/P EST MOD 30 MIN: CPT | Performed by: NURSE PRACTITIONER

## 2024-01-30 PROCEDURE — 1160F RVW MEDS BY RX/DR IN RCRD: CPT | Performed by: NURSE PRACTITIONER

## 2024-01-30 PROCEDURE — 1126F AMNT PAIN NOTED NONE PRSNT: CPT | Performed by: NURSE PRACTITIONER

## 2024-01-30 RX ORDER — ATORVASTATIN CALCIUM 40 MG/1
40 TABLET, FILM COATED ORAL DAILY
Qty: 90 TABLET | Refills: 3 | Status: SHIPPED | OUTPATIENT
Start: 2024-01-30 | End: 2024-01-30 | Stop reason: SDUPTHER

## 2024-01-30 RX ORDER — TRAMADOL HYDROCHLORIDE 50 MG/1
TABLET ORAL
COMMUNITY
Start: 2023-03-31 | End: 2024-01-30 | Stop reason: WASHOUT

## 2024-01-30 RX ORDER — OXYCODONE HYDROCHLORIDE 5 MG/1
TABLET ORAL
COMMUNITY
Start: 2023-03-27 | End: 2024-01-30 | Stop reason: WASHOUT

## 2024-01-30 RX ORDER — FLUTICASONE PROPIONATE AND SALMETEROL 250; 50 UG/1; UG/1
POWDER RESPIRATORY (INHALATION)
COMMUNITY
Start: 2023-03-06

## 2024-01-30 RX ORDER — LANOLIN ALCOHOL/MO/W.PET/CERES
1 CREAM (GRAM) TOPICAL DAILY
COMMUNITY
Start: 2024-01-22 | End: 2024-01-30

## 2024-01-30 RX ORDER — CELECOXIB 200 MG/1
200 CAPSULE ORAL DAILY
COMMUNITY
Start: 2023-12-13

## 2024-01-30 RX ORDER — METHOCARBAMOL 500 MG/1
TABLET, FILM COATED ORAL
COMMUNITY
Start: 2023-06-17 | End: 2024-01-30 | Stop reason: WASHOUT

## 2024-01-30 RX ORDER — ATORVASTATIN CALCIUM 40 MG/1
40 TABLET, FILM COATED ORAL DAILY
Qty: 90 TABLET | Refills: 3 | Status: SHIPPED | OUTPATIENT
Start: 2024-01-30 | End: 2025-01-29

## 2024-01-30 RX ORDER — CLOPIDOGREL BISULFATE 75 MG/1
TABLET ORAL
COMMUNITY
Start: 2023-05-09 | End: 2024-01-30 | Stop reason: WASHOUT

## 2024-01-30 RX ORDER — POTASSIUM CHLORIDE 20 MEQ/1
20 TABLET, EXTENDED RELEASE ORAL DAILY
COMMUNITY
Start: 2024-01-19 | End: 2024-01-30

## 2024-01-30 RX ORDER — BENZONATATE 100 MG/1
100 CAPSULE ORAL 3 TIMES DAILY PRN
COMMUNITY
Start: 2024-01-18

## 2024-01-30 NOTE — PROGRESS NOTES
Name : Anita Calabrese   : 1950   MRN : [id number]   ENC Date : 2024    CC: SOB     HPI:    Ms Calabrese is a 73 y.o. obese AA female with a history of hypertension, dyslipidemia, coronary artery disease based on CT scan of the chest, Former 40 pack year smoker & COPD who presents today for annual cardiovascular follow up.    Hospital course:  She presented to [facility] ED on 22 with c/o chest pain. Pain was described as retrosternal with a heaviness/pressure sensation, worse with exertion and improved with rest. She has an extensive family history of coronary artery disease with her father having an MI at 42 and a second MI age 62 and . Troponin x 2 negative. She remained stable throughout her hospital stay. No events recorded on telemetry and no ischemic changes on EKGs. She was evaluated by cardiology who recommended outpatient stress test for her atypical angina. Her ASCVD was calculated at 12.4% indicating she should be on a high intensity statin. She was prescribed atorvastatin 40 mg once a day & d/c'd home.     She had a lexiscan on 2022 which showed Normal myocardial perfusion, no e/o ischemia or infarct, LVEF 65%.     Still with persistent SOB at rest & SORIA. Has not improved. She gets short of breath just making her bed, but not worse than prior. Chest discomfort happens randomly, maybe a couple times a week. Stopped taking her Atorvastatin because there was no refill. She just thought I took her off of it.    ROS: unless otherwise noted in the history of present illness, all other systems were reviewed and they are negative for complaints     Allergies:  Azithromycin, Penicillins, and Salicylates    Current Outpatient Medications   Medication Instructions    albuterol 90 mcg/actuation inhaler 2 puffs, inhalation, 4 times daily RT    aspirin 81 mg, oral, Daily RT    atorvastatin (LIPITOR) 40 mg, oral, Daily    benzonatate (TESSALON) 100 mg, oral, 3 times daily PRN    celecoxib  (CELEBREX) 200 mg, oral, Daily    cholecalciferol (VITAMIN D-3) 50,000 Units, oral, Weekly    folic acid (FOLVITE) 1 mg, oral, Daily    gabapentin (NEURONTIN) 600 mg    hydroCHLOROthiazide (HYDRODiuril) 25 mg tablet TAKE 2 TABLETS(50 MG) BY MOUTH EVERY DAY    hydroxychloroquine (Plaquenil) 200 mg tablet 1 tablet, oral, 2 times daily    losartan (COZAAR) 100 mg, oral, Daily    magnesium oxide (Mag-Ox) 400 mg (241.3 mg magnesium) tablet 1 tablet, oral, Daily    methotrexate (TREXALL) 2.5 mg, oral, Weekly    nitroglycerin (NITROSTAT) 0.4 mg, sublingual, Every 5 min PRN    potassium chloride CR 20 mEq ER tablet 20 mEq, oral, Daily    Wixela Inhub 250-50 mcg/dose diskus inhaler         Last Labs:  CBC  Lab Results   Component Value Date    WBC 7.1 10/23/2023    HGB 12.0 10/23/2023    HCT 37.9 10/23/2023    MCV 98 10/23/2023     10/23/2023       CMP  Lab Results   Component Value Date    CALCIUM 9.3 10/23/2023    PROT 7.2 10/23/2023    ALBUMIN 4.0 10/23/2023    AST 18 10/23/2023    ALT 18 10/23/2023    ALKPHOS 73 10/23/2023    BILITOT 0.4 10/23/2023       BMP   Lab Results   Component Value Date     10/23/2023    K 4.0 10/23/2023     10/23/2023    CO2 29 10/23/2023    GLUCOSE 89 10/23/2023    BUN 25 (H) 10/23/2023    CREATININE 1.01 10/23/2023       LIPID PANEL   Lab Results   Component Value Date    CHOL 161 06/01/2022    TRIG 125 06/01/2022    HDL 62.0 06/01/2022    CHHDL 2.6 06/01/2022    LDLF 74 06/01/2022    VLDL 25 06/01/2022       RENAL FUNCTION PANEL   Lab Results   Component Value Date    GLUCOSE 89 10/23/2023     10/23/2023    K 4.0 10/23/2023     10/23/2023    CO2 29 10/23/2023    ANIONGAP 10 10/23/2023    BUN 25 (H) 10/23/2023    CREATININE 1.01 10/23/2023    CALCIUM 9.3 10/23/2023    ALBUMIN 4.0 10/23/2023        Lab Results   Component Value Date    BNP 25 10/23/2023    HGBA1C 5.4 07/05/2022       Last Recorded Vitals:  Vitals:    01/30/24 1245   BP: 124/64   BP Location: Left  "arm   Patient Position: Sitting   Pulse: 68   Resp: 18   SpO2: 96%   Weight: 105 kg (232 lb 6.4 oz)   Height: 1.689 m (5' 6.5\")       Physical Exam:  On exam Ms. Calabrese appears her stated age, is alert and oriented x3, and in no acute distress. Her sclera are anicteric and her oropharynx has moist mucous membranes. Her neck is supple and without thyromegaly. The JVP is ~5 cm of water above the right atrium. Her cardiac exam has regular rhythm, normal S1, S2. No S3/4. There are no murmurs. Her lungs are clear to auscultation bilaterally and there is no dullness to percussion. Her abdomen is soft, nontender with normoactive bowel sounds. There is no HJR. The extremities are warm and without edema. The skin is dry. There is no rash present. The distal pulses are 2-3+ in all four extremities. Her mood and affect are appropriate for todays encounter.     Assessment/Plan:  Persistent SOB  SORIA.  HLD     Nuclear stress test in 2022 was normal & echocardiogram at that time showed EF 55-60%. Given persistent symptoms, coronary calcification on CT chest though study not optimized & h/o smoking, will repeat testing. If normal, then will discuss with Dr. Cantor to determine if benefit outweighs risk of cath. Restart statin.    Follow up with me after testing.    Tracy M Schwab, APRN-CNP  "

## 2024-01-30 NOTE — PATIENT INSTRUCTIONS
- nuclear stress test  - Echocardiogram (ultrasound of your heart) to assess the function as well as for any valve abnormalities  - Follow up with me after to review results

## 2024-02-06 ENCOUNTER — LAB (OUTPATIENT)
Dept: LAB | Facility: LAB | Age: 74
End: 2024-02-06
Payer: MEDICARE

## 2024-02-06 ENCOUNTER — OFFICE VISIT (OUTPATIENT)
Dept: PRIMARY CARE | Facility: CLINIC | Age: 74
End: 2024-02-06
Payer: MEDICARE

## 2024-02-06 VITALS
HEART RATE: 92 BPM | TEMPERATURE: 98.2 F | SYSTOLIC BLOOD PRESSURE: 116 MMHG | OXYGEN SATURATION: 97 % | RESPIRATION RATE: 18 BRPM | DIASTOLIC BLOOD PRESSURE: 72 MMHG

## 2024-02-06 DIAGNOSIS — J44.1 CHRONIC OBSTRUCTIVE PULMONARY DISEASE WITH ACUTE EXACERBATION (MULTI): ICD-10-CM

## 2024-02-06 DIAGNOSIS — K21.9 GASTROESOPHAGEAL REFLUX DISEASE WITHOUT ESOPHAGITIS: ICD-10-CM

## 2024-02-06 DIAGNOSIS — K21.9 GASTROESOPHAGEAL REFLUX DISEASE WITHOUT ESOPHAGITIS: Primary | ICD-10-CM

## 2024-02-06 LAB
ALBUMIN SERPL BCP-MCNC: 4.4 G/DL (ref 3.4–5)
ALP SERPL-CCNC: 57 U/L (ref 33–136)
ALT SERPL W P-5'-P-CCNC: 14 U/L (ref 7–45)
ANION GAP SERPL CALC-SCNC: 12 MMOL/L (ref 10–20)
AST SERPL W P-5'-P-CCNC: 14 U/L (ref 9–39)
BILIRUB SERPL-MCNC: 0.7 MG/DL (ref 0–1.2)
BUN SERPL-MCNC: 21 MG/DL (ref 6–23)
CALCIUM SERPL-MCNC: 10.2 MG/DL (ref 8.6–10.3)
CHLORIDE SERPL-SCNC: 103 MMOL/L (ref 98–107)
CO2 SERPL-SCNC: 28 MMOL/L (ref 21–32)
CREAT SERPL-MCNC: 1.37 MG/DL (ref 0.5–1.05)
EGFRCR SERPLBLD CKD-EPI 2021: 40 ML/MIN/1.73M*2
GLUCOSE SERPL-MCNC: 118 MG/DL (ref 74–99)
LIPASE SERPL-CCNC: 18 U/L (ref 9–82)
POTASSIUM SERPL-SCNC: 3.8 MMOL/L (ref 3.5–5.3)
PROT SERPL-MCNC: 7.8 G/DL (ref 6.4–8.2)
SODIUM SERPL-SCNC: 139 MMOL/L (ref 136–145)

## 2024-02-06 PROCEDURE — 3078F DIAST BP <80 MM HG: CPT | Performed by: FAMILY MEDICINE

## 2024-02-06 PROCEDURE — 83690 ASSAY OF LIPASE: CPT

## 2024-02-06 PROCEDURE — 1160F RVW MEDS BY RX/DR IN RCRD: CPT | Performed by: FAMILY MEDICINE

## 2024-02-06 PROCEDURE — 1159F MED LIST DOCD IN RCRD: CPT | Performed by: FAMILY MEDICINE

## 2024-02-06 PROCEDURE — 80053 COMPREHEN METABOLIC PANEL: CPT

## 2024-02-06 PROCEDURE — 99214 OFFICE O/P EST MOD 30 MIN: CPT | Performed by: FAMILY MEDICINE

## 2024-02-06 PROCEDURE — 1036F TOBACCO NON-USER: CPT | Performed by: FAMILY MEDICINE

## 2024-02-06 PROCEDURE — 3074F SYST BP LT 130 MM HG: CPT | Performed by: FAMILY MEDICINE

## 2024-02-06 PROCEDURE — 1126F AMNT PAIN NOTED NONE PRSNT: CPT | Performed by: FAMILY MEDICINE

## 2024-02-06 PROCEDURE — 36415 COLL VENOUS BLD VENIPUNCTURE: CPT

## 2024-02-06 RX ORDER — OMEPRAZOLE 20 MG/1
20 CAPSULE, DELAYED RELEASE ORAL DAILY
Qty: 30 CAPSULE | Refills: 5 | Status: SHIPPED | OUTPATIENT
Start: 2024-02-06 | End: 2024-02-06

## 2024-02-06 RX ORDER — FAMOTIDINE 20 MG/1
20 TABLET, FILM COATED ORAL 2 TIMES DAILY
Qty: 60 TABLET | Refills: 1 | Status: SHIPPED | OUTPATIENT
Start: 2024-02-06 | End: 2024-05-09

## 2024-02-06 ASSESSMENT — ENCOUNTER SYMPTOMS
ARTHRALGIAS: 1
CONSTITUTIONAL NEGATIVE: 1
ROS GI COMMENTS: DYSPHAGIA
BACK PAIN: 1
CARDIOVASCULAR NEGATIVE: 1
RESPIRATORY NEGATIVE: 1

## 2024-02-06 NOTE — PROGRESS NOTES
Subjective   Patient ID: Anita Calabrese is a 73 y.o. female who presents for Follow-up.    Patient has had trouble swallowing.  Patient feels like the food gets stuck.  Feels like her mouth is really dry.  Sometimes she will eat leaning sideways and she will really feel it.  She has had no choking, no dark stools, no vomiting, no right upper quadrant pain.  Patient states that it feels like the food is having a hard time getting down.  This been going on for some time now.  She states that is almost every time she eats.  No weight loss, no postnasal drainage, patient feels like her breath is also starting to smell..  No abdominal pain, stools are normal normal urine, patient's breathing is fine.  Her symptoms seem to have improved         Review of Systems   Constitutional: Negative.    Respiratory: Negative.     Cardiovascular: Negative.    Gastrointestinal:         Dysphagia   Musculoskeletal:  Positive for arthralgias and back pain.       Objective   /72 (BP Location: Right arm, Patient Position: Sitting, BP Cuff Size: Large adult)   Pulse 92   Temp 36.8 °C (98.2 °F)   Resp 18   SpO2 97%     Physical Exam  Constitutional:       Appearance: Normal appearance.   Cardiovascular:      Rate and Rhythm: Normal rate and regular rhythm.   Pulmonary:      Effort: Pulmonary effort is normal.      Breath sounds: Normal breath sounds.   Abdominal:      General: Abdomen is flat.      Palpations: Abdomen is soft.      Tenderness: There is no abdominal tenderness.   Neurological:      Mental Status: She is alert.         Assessment/Plan   Problem List Items Addressed This Visit       GERD (gastroesophageal reflux disease) - Primary    Relevant Medications    famotidine (Pepcid) 20 mg tablet    Other Relevant Orders    Referral to Gastroenterology    Comprehensive metabolic panel    Lipase     Other Visit Diagnoses       Chronic obstructive pulmonary disease with acute exacerbation (CMS/[facility])            Patient  having some GERD, possible dysphagia.  Due to patient's age and history patient needs to see GI for likely EGD.  Try the Pepcid.  Patient aware not to take Prilosec due to her decreased kidney function.  Patient aware if any chest pain shortness of breath any nausea vomit diarrhea fever headache any worsening symptoms any choking any abdominal pain notify office or go to the ER  Follow-up in 2 to 4 weeks  Side effects of medication explained

## 2024-02-07 NOTE — RESULT ENCOUNTER NOTE
Patient's kidney function is decreased.  It could be if she is a little dehydrated she has been taking a lot of anti-inflammatories.  I want to repeat this in 2 weeks.  Orders been placed.  Please let her know to stay hydrated.  Avoid NSAIDs/anti-inflammatories
Forceps were not used

## 2024-02-14 ENCOUNTER — HOSPITAL ENCOUNTER (OUTPATIENT)
Dept: RADIOLOGY | Facility: HOSPITAL | Age: 74
Discharge: HOME | End: 2024-02-14
Payer: MEDICARE

## 2024-02-14 ENCOUNTER — HOSPITAL ENCOUNTER (OUTPATIENT)
Dept: CARDIOLOGY | Facility: HOSPITAL | Age: 74
Discharge: HOME | End: 2024-02-14
Payer: MEDICARE

## 2024-02-14 DIAGNOSIS — R06.02 SHORTNESS OF BREATH: ICD-10-CM

## 2024-02-14 PROCEDURE — 93016 CV STRESS TEST SUPVJ ONLY: CPT | Performed by: INTERNAL MEDICINE

## 2024-02-14 PROCEDURE — 78452 HT MUSCLE IMAGE SPECT MULT: CPT | Performed by: RADIOLOGY

## 2024-02-14 PROCEDURE — 93017 CV STRESS TEST TRACING ONLY: CPT

## 2024-02-14 PROCEDURE — 3430000001 HC RX 343 DIAGNOSTIC RADIOPHARMACEUTICALS: Performed by: NURSE PRACTITIONER

## 2024-02-14 PROCEDURE — A9502 TC99M TETROFOSMIN: HCPCS | Performed by: NURSE PRACTITIONER

## 2024-02-14 PROCEDURE — 78452 HT MUSCLE IMAGE SPECT MULT: CPT

## 2024-02-14 RX ADMIN — TETROFOSMIN 32.4 MILLICURIE: 0.23 INJECTION, POWDER, LYOPHILIZED, FOR SOLUTION INTRAVENOUS at 11:30

## 2024-02-14 RX ADMIN — TETROFOSMIN 10 MILLICURIE: 0.23 INJECTION, POWDER, LYOPHILIZED, FOR SOLUTION INTRAVENOUS at 10:20

## 2024-02-23 ENCOUNTER — TELEPHONE (OUTPATIENT)
Dept: PRIMARY CARE | Facility: CLINIC | Age: 74
End: 2024-02-23
Payer: MEDICARE

## 2024-02-26 DIAGNOSIS — B37.31 YEAST VAGINITIS: Primary | ICD-10-CM

## 2024-02-26 RX ORDER — GUAIFEN/DEXTROMETHORPHAN/PPA
1 SYRUP ORAL NIGHTLY
Qty: 3 EACH | Refills: 0 | Status: SHIPPED | OUTPATIENT
Start: 2024-02-26 | End: 2024-02-29

## 2024-03-13 ENCOUNTER — OFFICE VISIT (OUTPATIENT)
Dept: GASTROENTEROLOGY | Facility: CLINIC | Age: 74
End: 2024-03-13
Payer: MEDICARE

## 2024-03-13 VITALS
WEIGHT: 220 LBS | HEIGHT: 67 IN | SYSTOLIC BLOOD PRESSURE: 138 MMHG | DIASTOLIC BLOOD PRESSURE: 83 MMHG | HEART RATE: 106 BPM | BODY MASS INDEX: 34.53 KG/M2

## 2024-03-13 DIAGNOSIS — R13.19 ESOPHAGEAL DYSPHAGIA: Primary | ICD-10-CM

## 2024-03-13 DIAGNOSIS — R13.10 DYSPHAGIA, UNSPECIFIED TYPE: ICD-10-CM

## 2024-03-13 DIAGNOSIS — K21.9 GASTROESOPHAGEAL REFLUX DISEASE WITHOUT ESOPHAGITIS: ICD-10-CM

## 2024-03-13 DIAGNOSIS — Z86.73 HISTORY OF TIA (TRANSIENT ISCHEMIC ATTACK): ICD-10-CM

## 2024-03-13 PROCEDURE — 3075F SYST BP GE 130 - 139MM HG: CPT | Performed by: STUDENT IN AN ORGANIZED HEALTH CARE EDUCATION/TRAINING PROGRAM

## 2024-03-13 PROCEDURE — 1160F RVW MEDS BY RX/DR IN RCRD: CPT | Performed by: STUDENT IN AN ORGANIZED HEALTH CARE EDUCATION/TRAINING PROGRAM

## 2024-03-13 PROCEDURE — 3079F DIAST BP 80-89 MM HG: CPT | Performed by: STUDENT IN AN ORGANIZED HEALTH CARE EDUCATION/TRAINING PROGRAM

## 2024-03-13 PROCEDURE — 1036F TOBACCO NON-USER: CPT | Performed by: STUDENT IN AN ORGANIZED HEALTH CARE EDUCATION/TRAINING PROGRAM

## 2024-03-13 PROCEDURE — 1159F MED LIST DOCD IN RCRD: CPT | Performed by: STUDENT IN AN ORGANIZED HEALTH CARE EDUCATION/TRAINING PROGRAM

## 2024-03-13 PROCEDURE — 99205 OFFICE O/P NEW HI 60 MIN: CPT | Performed by: STUDENT IN AN ORGANIZED HEALTH CARE EDUCATION/TRAINING PROGRAM

## 2024-03-13 RX ORDER — LANSOPRAZOLE 30 MG/1
30 CAPSULE, DELAYED RELEASE ORAL DAILY
Qty: 90 CAPSULE | Refills: 3 | Status: SHIPPED | OUTPATIENT
Start: 2024-03-13 | End: 2025-03-13

## 2024-03-13 NOTE — PROGRESS NOTES
Subjective     History of Present Illness:   Anita Calabrese is a 73 y.o. female with hx of hypertension, rheumatoid arthritis on methotrexate, COPD, CAD on aspirin who presents to clinic for a several year history of dysphagia.  She notes she has been having dysphagia to dry foods with sensation of food being stuck in her mid esophagus.  This occurs once to twice a week.  She will also have episodes a few times a month where she will regurgitate food.  She has resorted to compensatory mechanisms including chewing slowly and softer foods to minimize these episodes.  She is not currently on any proton pump inhibitor.  She has not had any weight loss.  She has not had any emesis or abdominal pain.    She reports she had a colonoscopy at [facility] in 2022 and was told she had no polpys and to repeat in 5 years but we do not have records of this.   The last colonoscopy report we have in her chart is from 2015      Past Medical History   has a past medical history of Atypical chest pain (05/19/2023), Primary osteoarthritis of first carpometacarpal joint of left hand (02/15/2018), RSD (reflex sympathetic dystrophy) (05/19/2023), and Wheezing (06/11/2021).     Social History   reports that she quit smoking about 18 years ago. Her smoking use included cigarettes. She has been exposed to tobacco smoke. She has never used smokeless tobacco. She reports that she does not currently use alcohol. She reports that she does not use drugs.     Family History  family history is not on file.     Allergies  Allergies   Allergen Reactions    Azithromycin Hallucinations    Penicillins Unknown    Salicylates Swelling       Medications  Current Outpatient Medications   Medication Instructions    albuterol 90 mcg/actuation inhaler 2 puffs, inhalation, 4 times daily RT    aspirin 81 mg, oral, Daily RT    atorvastatin (LIPITOR) 40 mg, oral, Daily    benzonatate (TESSALON) 100 mg, oral, 3 times daily PRN    celecoxib (CELEBREX) 200 mg,  oral, Daily    cholecalciferol (VITAMIN D-3) 50,000 Units, oral, Weekly    famotidine (PEPCID) 20 mg, oral, 2 times daily    folic acid (FOLVITE) 1 mg, oral, Daily    gabapentin (NEURONTIN) 600 mg    hydroCHLOROthiazide (HYDRODiuril) 25 mg tablet TAKE 2 TABLETS(50 MG) BY MOUTH EVERY DAY    hydroxychloroquine (Plaquenil) 200 mg tablet 1 tablet, oral, 2 times daily    lansoprazole (PREVACID) 30 mg, oral, Daily, Do not crush or chew.    losartan (COZAAR) 100 mg, oral, Daily    methotrexate (TREXALL) 2.5 mg, oral, Weekly    nitroglycerin (NITROSTAT) 0.4 mg, sublingual, Every 5 min PRN    Wixela Inhub 250-50 mcg/dose diskus inhaler         Objective   Visit Vitals  /83   Pulse 106      Physical Exam  Constitutional:       General: She is not in acute distress.     Appearance: Normal appearance.   HENT:      Head: Normocephalic and atraumatic.      Mouth/Throat:      Mouth: Mucous membranes are moist.   Eyes:      Extraocular Movements: Extraocular movements intact.   Pulmonary:      Effort: Pulmonary effort is normal.      Breath sounds: No stridor. No wheezing.   Abdominal:      General: Abdomen is flat. There is no distension.   Musculoskeletal:         General: Normal range of motion.   Skin:     General: Skin is warm and dry.   Neurological:      General: No focal deficit present.      Mental Status: She is alert.   Psychiatric:         Mood and Affect: Mood normal.         Judgment: Judgment normal.         Assessment/Plan   Anita Calabrese is a 73 y.o. female with hx of hypertension, rheumatoid arthritis on methotrexate, COPD, CAD on aspirin who presents to clinic for a several year history of dysphagia to solids.  Symptoms are most consistent with esophageal dysphagia.  Given her advanced age this will need to be considered as an alarm signs and we will plan on urgent upper endoscopy for further evaluation of stricture, mass, esophagitis.  Note she is on methotrexate as well as Celebrex for history of  rheumatoid arthritis so infectious esophagitis is a possibility . In the interim we will start on PPI BID. We will attempt to obtain her colonoscopy report from 2022 from [facility].     Problem List Items Addressed This Visit       GERD (gastroesophageal reflux disease)    Relevant Orders    EGD    History of TIA (transient ischemic attack)     Other Visit Diagnoses       Esophageal dysphagia    -  Primary    Relevant Medications    lansoprazole (Prevacid) 30 mg DR capsule    Other Relevant Orders    EGD    Dysphagia, unspecified type                       Marly Rodriguez MD         My final recommendations will be communicated back to the requesting physician by way of shared Medical record or letter to requesting physician via fax.

## 2024-03-14 ENCOUNTER — TELEPHONE (OUTPATIENT)
Dept: GASTROENTEROLOGY | Facility: CLINIC | Age: 74
End: 2024-03-14
Payer: MEDICARE

## 2024-03-14 NOTE — TELEPHONE ENCOUNTER
Patient's Colonoscopy from [facility] is under encounters for 7/8/22. I was able to pull it out of the outside records

## 2024-03-15 ENCOUNTER — HOSPITAL ENCOUNTER (OUTPATIENT)
Dept: CARDIOLOGY | Facility: HOSPITAL | Age: 74
Discharge: HOME | End: 2024-03-15
Payer: MEDICARE

## 2024-03-15 DIAGNOSIS — R06.02 SHORTNESS OF BREATH: ICD-10-CM

## 2024-03-15 PROCEDURE — 93306 TTE W/DOPPLER COMPLETE: CPT | Performed by: INTERNAL MEDICINE

## 2024-03-15 PROCEDURE — 93306 TTE W/DOPPLER COMPLETE: CPT

## 2024-03-16 LAB
AORTIC VALVE MEAN GRADIENT: 3.7 MMHG
AORTIC VALVE PEAK VELOCITY: 1.28 M/S
AV PEAK GRADIENT: 6.6 MMHG
AVA (PEAK VEL): 2.56 CM2
AVA (VTI): 2.27 CM2
LEFT VENTRICLE INTERNAL DIMENSION DIASTOLE: 4.43 CM (ref 3.5–6)
LEFT VENTRICULAR OUTFLOW TRACT DIAMETER: 1.85 CM

## 2024-03-26 DIAGNOSIS — I10 BENIGN ESSENTIAL HYPERTENSION: ICD-10-CM

## 2024-03-26 RX ORDER — LOSARTAN POTASSIUM 100 MG/1
100 TABLET ORAL DAILY
Qty: 90 TABLET | Refills: 1 | Status: SHIPPED | OUTPATIENT
Start: 2024-03-26 | End: 2024-03-27 | Stop reason: SDUPTHER

## 2024-03-27 ENCOUNTER — TELEPHONE (OUTPATIENT)
Dept: PRIMARY CARE | Facility: CLINIC | Age: 74
End: 2024-03-27
Payer: MEDICARE

## 2024-03-27 DIAGNOSIS — I10 BENIGN ESSENTIAL HYPERTENSION: ICD-10-CM

## 2024-03-27 RX ORDER — LOSARTAN POTASSIUM 100 MG/1
100 TABLET ORAL DAILY
Qty: 90 TABLET | Refills: 1 | Status: SHIPPED | OUTPATIENT
Start: 2024-03-27

## 2024-05-09 DIAGNOSIS — K21.9 GASTROESOPHAGEAL REFLUX DISEASE WITHOUT ESOPHAGITIS: ICD-10-CM

## 2024-05-09 RX ORDER — SODIUM CHLORIDE, SODIUM LACTATE, POTASSIUM CHLORIDE, CALCIUM CHLORIDE 600; 310; 30; 20 MG/100ML; MG/100ML; MG/100ML; MG/100ML
20 INJECTION, SOLUTION INTRAVENOUS CONTINUOUS
Status: CANCELLED | OUTPATIENT
Start: 2024-05-09

## 2024-05-09 RX ORDER — ONDANSETRON HYDROCHLORIDE 2 MG/ML
4 INJECTION, SOLUTION INTRAVENOUS ONCE AS NEEDED
Status: CANCELLED | OUTPATIENT
Start: 2024-05-09

## 2024-05-09 RX ORDER — FAMOTIDINE 20 MG/1
20 TABLET, FILM COATED ORAL 2 TIMES DAILY
Qty: 60 TABLET | Refills: 3 | Status: SHIPPED | OUTPATIENT
Start: 2024-05-09

## 2024-05-10 ENCOUNTER — ANESTHESIA (OUTPATIENT)
Dept: GASTROENTEROLOGY | Facility: HOSPITAL | Age: 74
End: 2024-05-10
Payer: MEDICARE

## 2024-05-10 ENCOUNTER — HOSPITAL ENCOUNTER (OUTPATIENT)
Dept: GASTROENTEROLOGY | Facility: HOSPITAL | Age: 74
Setting detail: OUTPATIENT SURGERY
Discharge: HOME | End: 2024-05-10
Payer: MEDICARE

## 2024-05-10 ENCOUNTER — ANESTHESIA EVENT (OUTPATIENT)
Dept: GASTROENTEROLOGY | Facility: HOSPITAL | Age: 74
End: 2024-05-10
Payer: MEDICARE

## 2024-05-10 VITALS
WEIGHT: 225 LBS | HEART RATE: 80 BPM | DIASTOLIC BLOOD PRESSURE: 80 MMHG | HEIGHT: 66 IN | RESPIRATION RATE: 17 BRPM | TEMPERATURE: 97.3 F | SYSTOLIC BLOOD PRESSURE: 148 MMHG | BODY MASS INDEX: 36.16 KG/M2 | OXYGEN SATURATION: 96 %

## 2024-05-10 DIAGNOSIS — R13.19 ESOPHAGEAL DYSPHAGIA: ICD-10-CM

## 2024-05-10 DIAGNOSIS — K21.9 GASTROESOPHAGEAL REFLUX DISEASE WITHOUT ESOPHAGITIS: ICD-10-CM

## 2024-05-10 PROCEDURE — 88305 TISSUE EXAM BY PATHOLOGIST: CPT | Mod: TC,STJLAB | Performed by: STUDENT IN AN ORGANIZED HEALTH CARE EDUCATION/TRAINING PROGRAM

## 2024-05-10 PROCEDURE — 88305 TISSUE EXAM BY PATHOLOGIST: CPT | Performed by: PATHOLOGY

## 2024-05-10 PROCEDURE — 2500000004 HC RX 250 GENERAL PHARMACY W/ HCPCS (ALT 636 FOR OP/ED): Performed by: ANESTHESIOLOGIST ASSISTANT

## 2024-05-10 PROCEDURE — A43239 PR EDG TRANSORAL BIOPSY SINGLE/MULTIPLE: Performed by: ANESTHESIOLOGIST ASSISTANT

## 2024-05-10 PROCEDURE — 7100000010 HC PHASE TWO TIME - EACH INCREMENTAL 1 MINUTE: Performed by: STUDENT IN AN ORGANIZED HEALTH CARE EDUCATION/TRAINING PROGRAM

## 2024-05-10 PROCEDURE — 99100 ANES PT EXTEME AGE<1 YR&>70: CPT | Performed by: STUDENT IN AN ORGANIZED HEALTH CARE EDUCATION/TRAINING PROGRAM

## 2024-05-10 PROCEDURE — 3700000002 HC GENERAL ANESTHESIA TIME - EACH INCREMENTAL 1 MINUTE: Performed by: STUDENT IN AN ORGANIZED HEALTH CARE EDUCATION/TRAINING PROGRAM

## 2024-05-10 PROCEDURE — A43239 PR EDG TRANSORAL BIOPSY SINGLE/MULTIPLE: Performed by: STUDENT IN AN ORGANIZED HEALTH CARE EDUCATION/TRAINING PROGRAM

## 2024-05-10 PROCEDURE — 3700000001 HC GENERAL ANESTHESIA TIME - INITIAL BASE CHARGE: Performed by: STUDENT IN AN ORGANIZED HEALTH CARE EDUCATION/TRAINING PROGRAM

## 2024-05-10 PROCEDURE — 7100000009 HC PHASE TWO TIME - INITIAL BASE CHARGE: Performed by: STUDENT IN AN ORGANIZED HEALTH CARE EDUCATION/TRAINING PROGRAM

## 2024-05-10 PROCEDURE — 43239 EGD BIOPSY SINGLE/MULTIPLE: CPT | Performed by: STUDENT IN AN ORGANIZED HEALTH CARE EDUCATION/TRAINING PROGRAM

## 2024-05-10 RX ORDER — ONDANSETRON HYDROCHLORIDE 2 MG/ML
4 INJECTION, SOLUTION INTRAVENOUS ONCE AS NEEDED
OUTPATIENT
Start: 2024-05-10

## 2024-05-10 RX ORDER — MEPERIDINE HYDROCHLORIDE 50 MG/ML
12.5 INJECTION INTRAMUSCULAR; INTRAVENOUS; SUBCUTANEOUS EVERY 10 MIN PRN
OUTPATIENT
Start: 2024-05-10

## 2024-05-10 RX ORDER — FENTANYL CITRATE 50 UG/ML
25 INJECTION, SOLUTION INTRAMUSCULAR; INTRAVENOUS EVERY 5 MIN PRN
OUTPATIENT
Start: 2024-05-10

## 2024-05-10 RX ORDER — SODIUM CHLORIDE, SODIUM LACTATE, POTASSIUM CHLORIDE, CALCIUM CHLORIDE 600; 310; 30; 20 MG/100ML; MG/100ML; MG/100ML; MG/100ML
INJECTION, SOLUTION INTRAVENOUS CONTINUOUS PRN
Status: DISCONTINUED | OUTPATIENT
Start: 2024-05-10 | End: 2024-05-10

## 2024-05-10 RX ORDER — PROPOFOL 10 MG/ML
INJECTION, EMULSION INTRAVENOUS AS NEEDED
Status: DISCONTINUED | OUTPATIENT
Start: 2024-05-10 | End: 2024-05-10

## 2024-05-10 RX ORDER — LABETALOL HYDROCHLORIDE 5 MG/ML
5 INJECTION, SOLUTION INTRAVENOUS ONCE AS NEEDED
OUTPATIENT
Start: 2024-05-10

## 2024-05-10 RX ORDER — ALBUTEROL SULFATE 0.83 MG/ML
2.5 SOLUTION RESPIRATORY (INHALATION) ONCE AS NEEDED
OUTPATIENT
Start: 2024-05-10

## 2024-05-10 RX ORDER — LIDOCAINE HYDROCHLORIDE 10 MG/ML
0.1 INJECTION INFILTRATION; PERINEURAL ONCE
OUTPATIENT
Start: 2024-05-10 | End: 2024-05-10

## 2024-05-10 RX ORDER — OXYCODONE HYDROCHLORIDE 5 MG/1
5 TABLET ORAL EVERY 4 HOURS PRN
OUTPATIENT
Start: 2024-05-10

## 2024-05-10 RX ORDER — SODIUM CHLORIDE, SODIUM LACTATE, POTASSIUM CHLORIDE, CALCIUM CHLORIDE 600; 310; 30; 20 MG/100ML; MG/100ML; MG/100ML; MG/100ML
100 INJECTION, SOLUTION INTRAVENOUS CONTINUOUS
OUTPATIENT
Start: 2024-05-10

## 2024-05-10 RX ADMIN — PROPOFOL 50 MG: 10 INJECTION, EMULSION INTRAVENOUS at 13:22

## 2024-05-10 RX ADMIN — SODIUM CHLORIDE, POTASSIUM CHLORIDE, SODIUM LACTATE AND CALCIUM CHLORIDE: 600; 310; 30; 20 INJECTION, SOLUTION INTRAVENOUS at 13:14

## 2024-05-10 RX ADMIN — PROPOFOL 50 MG: 10 INJECTION, EMULSION INTRAVENOUS at 13:26

## 2024-05-10 RX ADMIN — PROPOFOL 100 MG: 10 INJECTION, EMULSION INTRAVENOUS at 13:19

## 2024-05-10 SDOH — HEALTH STABILITY: MENTAL HEALTH: CURRENT SMOKER: 0

## 2024-05-10 ASSESSMENT — PAIN - FUNCTIONAL ASSESSMENT
PAIN_FUNCTIONAL_ASSESSMENT: 0-10

## 2024-05-10 ASSESSMENT — PAIN SCALES - GENERAL
PAIN_LEVEL: 0
PAINLEVEL_OUTOF10: 0 - NO PAIN

## 2024-05-10 ASSESSMENT — COLUMBIA-SUICIDE SEVERITY RATING SCALE - C-SSRS
2. HAVE YOU ACTUALLY HAD ANY THOUGHTS OF KILLING YOURSELF?: NO
6. HAVE YOU EVER DONE ANYTHING, STARTED TO DO ANYTHING, OR PREPARED TO DO ANYTHING TO END YOUR LIFE?: NO
1. IN THE PAST MONTH, HAVE YOU WISHED YOU WERE DEAD OR WISHED YOU COULD GO TO SLEEP AND NOT WAKE UP?: NO

## 2024-05-10 NOTE — ANESTHESIA POSTPROCEDURE EVALUATION
Patient: Anita Calabrese    Procedure Summary       Date: 05/10/24 Room / Location: [facility]    Anesthesia Start: 1314 Anesthesia Stop: 1338    Procedure: EGD Diagnosis:       Gastroesophageal reflux disease without esophagitis      Esophageal dysphagia    Scheduled Providers: Marly Rodriguez MD Responsible Provider: Chepe Harrison DO    Anesthesia Type: general ASA Status: 3            Anesthesia Type: general    Vitals Value Taken Time   /60 05/10/24 1338   Temp 36.3 05/10/24 1338   Pulse 113 05/10/24 1338   Resp 12 05/10/24 1338   SpO2 97 05/10/24 1338       Anesthesia Post Evaluation    Patient location during evaluation: PACU  Patient participation: complete - patient cannot participate  Level of consciousness: sleepy but conscious  Pain score: 0  Pain management: adequate  There was medical reason for not using a multimodal analgesia pain management approach.Airway patency: patent  Two or more strategies used to mitigate risk of obstructive sleep apnea  Cardiovascular status: acceptable and stable  Respiratory status: acceptable and room air  Hydration status: acceptable  Postoperative Nausea and Vomiting: none        No notable events documented.

## 2024-05-10 NOTE — ANESTHESIA PREPROCEDURE EVALUATION
Patient: Anita Calabrese    Procedure Information       Date/Time: 05/10/24 1130    Scheduled providers: Marly Rodriguez MD    Procedure: EGD    Location: [facility] [place]            Relevant Problems   Cardiac   (+) Atherosclerosis of coronary artery   (+) Benign essential hypertension   (+) MVP (mitral valve prolapse)   (+) Mixed hyperlipidemia      Pulmonary   (+) SORIA (dyspnea on exertion)      Neuro   (+) Trigeminal neuralgia      GI   (+) GERD (gastroesophageal reflux disease)      Musculoskeletal   (+) Cervical spondylosis with radiculopathy   (+) Osteoarthritis   (+) Rheumatoid arthritis, involving unspecified site, unspecified whether rheumatoid factor present (Multi)       Clinical information reviewed:   Tobacco  Allergies  Meds   Med Hx  Surg Hx  OB Status  Fam Hx  Soc   Hx        NPO Detail:  NPO/Void Status  Carbohydrate Drink Given Prior to Surgery? : N  Date of Last Liquid: 05/09/24  Time of Last Liquid: 2350  Date of Last Solid: 05/09/24  Time of Last Solid: 2350  Last Intake Type: Clear fluids  Time of Last Void: 1109         Physical Exam    Airway  Mallampati: II     Cardiovascular - normal exam  Rhythm: regular  Rate: normal     Dental - normal exam     Pulmonary - normal exam     Abdominal - normal exam  Abdomen: soft             Anesthesia Plan    History of general anesthesia?: yes  History of complications of general anesthesia?: no    ASA 3     general     The patient is not a current smoker.  Patient was previously instructed to abstain from smoking on day of procedure.  Patient did not smoke on day of procedure.  Education provided regarding risk of obstructive sleep apnea.  intravenous induction   Postoperative administration of opioids is intended.  Anesthetic plan and risks discussed with patient.  Use of blood products discussed with patient who.    Plan discussed with [facility].

## 2024-05-20 LAB
LABORATORY COMMENT REPORT: NORMAL
PATH REPORT.FINAL DX SPEC: NORMAL
PATH REPORT.GROSS SPEC: NORMAL
PATH REPORT.RELEVANT HX SPEC: NORMAL
PATH REPORT.TOTAL CANCER: NORMAL

## 2024-05-22 ENCOUNTER — APPOINTMENT (OUTPATIENT)
Dept: PRIMARY CARE | Facility: CLINIC | Age: 74
End: 2024-05-22
Payer: MEDICARE

## 2024-07-03 ENCOUNTER — APPOINTMENT (OUTPATIENT)
Dept: PRIMARY CARE | Facility: CLINIC | Age: 74
End: 2024-07-03
Payer: MEDICARE

## 2024-07-03 VITALS
WEIGHT: 225 LBS | HEART RATE: 88 BPM | SYSTOLIC BLOOD PRESSURE: 128 MMHG | OXYGEN SATURATION: 96 % | DIASTOLIC BLOOD PRESSURE: 78 MMHG | RESPIRATION RATE: 16 BRPM | BODY MASS INDEX: 36.16 KG/M2 | HEIGHT: 66 IN | TEMPERATURE: 98 F

## 2024-07-03 DIAGNOSIS — Z00.00 ROUTINE GENERAL MEDICAL EXAMINATION AT HEALTH CARE FACILITY: Primary | ICD-10-CM

## 2024-07-03 DIAGNOSIS — Z12.31 BREAST CANCER SCREENING BY MAMMOGRAM: ICD-10-CM

## 2024-07-03 DIAGNOSIS — E78.2 MIXED HYPERLIPIDEMIA: ICD-10-CM

## 2024-07-03 DIAGNOSIS — M54.50 ACUTE RIGHT-SIDED LOW BACK PAIN WITHOUT SCIATICA: ICD-10-CM

## 2024-07-03 PROCEDURE — 3078F DIAST BP <80 MM HG: CPT | Performed by: FAMILY MEDICINE

## 2024-07-03 PROCEDURE — 1157F ADVNC CARE PLAN IN RCRD: CPT | Performed by: FAMILY MEDICINE

## 2024-07-03 PROCEDURE — 3074F SYST BP LT 130 MM HG: CPT | Performed by: FAMILY MEDICINE

## 2024-07-03 PROCEDURE — 1036F TOBACCO NON-USER: CPT | Performed by: FAMILY MEDICINE

## 2024-07-03 PROCEDURE — 1158F ADVNC CARE PLAN TLK DOCD: CPT | Performed by: FAMILY MEDICINE

## 2024-07-03 PROCEDURE — 1123F ACP DISCUSS/DSCN MKR DOCD: CPT | Performed by: FAMILY MEDICINE

## 2024-07-03 PROCEDURE — 1159F MED LIST DOCD IN RCRD: CPT | Performed by: FAMILY MEDICINE

## 2024-07-03 PROCEDURE — G0439 PPPS, SUBSEQ VISIT: HCPCS | Performed by: FAMILY MEDICINE

## 2024-07-03 ASSESSMENT — ENCOUNTER SYMPTOMS
WEAKNESS: 0
PSYCHIATRIC NEGATIVE: 1
OCCASIONAL FEELINGS OF UNSTEADINESS: 1
DEPRESSION: 0
NUMBNESS: 0
ABDOMINAL PAIN: 0
LOSS OF SENSATION IN FEET: 0
SHORTNESS OF BREATH: 0
VOMITING: 0
COUGH: 0
BACK PAIN: 1
DIARRHEA: 0
DIZZINESS: 0
CONSTIPATION: 0
WHEEZING: 0
ARTHRALGIAS: 1
FEVER: 0
HEADACHES: 0

## 2024-07-03 ASSESSMENT — PATIENT HEALTH QUESTIONNAIRE - PHQ9
10. IF YOU CHECKED OFF ANY PROBLEMS, HOW DIFFICULT HAVE THESE PROBLEMS MADE IT FOR YOU TO DO YOUR WORK, TAKE CARE OF THINGS AT HOME, OR GET ALONG WITH OTHER PEOPLE: EXTREMELY DIFFICULT
8. MOVING OR SPEAKING SO SLOWLY THAT OTHER PEOPLE COULD HAVE NOTICED. OR THE OPPOSITE, BEING SO FIGETY OR RESTLESS THAT YOU HAVE BEEN MOVING AROUND A LOT MORE THAN USUAL: NEARLY EVERY DAY
5. POOR APPETITE OR OVEREATING: NOT AT ALL
1. LITTLE INTEREST OR PLEASURE IN DOING THINGS: NEARLY EVERY DAY
7. TROUBLE CONCENTRATING ON THINGS, SUCH AS READING THE NEWSPAPER OR WATCHING TELEVISION: NOT AT ALL
SUM OF ALL RESPONSES TO PHQ9 QUESTIONS 1 AND 2: 3
9. THOUGHTS THAT YOU WOULD BE BETTER OFF DEAD, OR OF HURTING YOURSELF: NOT AT ALL
SUM OF ALL RESPONSES TO PHQ QUESTIONS 1-9: 12
6. FEELING BAD ABOUT YOURSELF - OR THAT YOU ARE A FAILURE OR HAVE LET YOURSELF OR YOUR FAMILY DOWN: NOT AT ALL
4. FEELING TIRED OR HAVING LITTLE ENERGY: NEARLY EVERY DAY
2. FEELING DOWN, DEPRESSED OR HOPELESS: NOT AT ALL
3. TROUBLE FALLING OR STAYING ASLEEP OR SLEEPING TOO MUCH: NEARLY EVERY DAY

## 2024-07-03 NOTE — PROGRESS NOTES
"Subjective   Reason for Visit: Anita Calabrese is an 73 y.o. female here for a Medicare Wellness visit.          Reviewed all medications by prescribing practitioner or clinical pharmacist (such as prescriptions, OTCs, herbal therapies and supplements) and documented in the medical record.    Patient not having chest pain or shortness of breath no nausea vomiting.  She still has a lot of back pain.  She has been doing physical therapy for at least 4 weeks.  She actually did some prior to that.  She is seeing cardiology she saw GI.  She saw pain management but they did not do any injections at that time.  She has been trying to do stretches exercises.  Patient still getting a lot of pain.        Patient Care Team:  Jose Angel Ragland DO as PCP - General  Tracy M Schwab, APRN-CNP as Nurse Practitioner (Cardiology)     Review of Systems   Constitutional:  Negative for fever.   HENT: Negative.     Respiratory:  Negative for cough, shortness of breath and wheezing.    Cardiovascular:  Negative for chest pain and leg swelling.   Gastrointestinal:  Negative for abdominal pain, constipation, diarrhea and vomiting.   Genitourinary: Negative.    Musculoskeletal:  Positive for arthralgias and back pain.   Skin:  Negative for rash.   Neurological:  Negative for dizziness, weakness, numbness and headaches.   Psychiatric/Behavioral: Negative.         Objective   Vitals:  /78 (BP Location: Left arm, Patient Position: Sitting, BP Cuff Size: Large adult)   Pulse 88   Temp 36.7 °C (98 °F)   Resp 16   Ht 1.676 m (5' 6\")   Wt 102 kg (225 lb)   SpO2 96%   BMI 36.32 kg/m²       Physical Exam  Vitals and nursing note reviewed.   Constitutional:       General: She is not in acute distress.     Appearance: Normal appearance. She is not ill-appearing.   HENT:      Head: Normocephalic.      Right Ear: Tympanic membrane and ear canal normal.      Left Ear: Tympanic membrane and ear canal normal.      Nose: Nose normal.      " Mouth/Throat:      Mouth: Mucous membranes are moist.      Pharynx: Oropharynx is clear.   Eyes:      Extraocular Movements: Extraocular movements intact.      Conjunctiva/sclera: Conjunctivae normal.      Pupils: Pupils are equal, round, and reactive to light.   Cardiovascular:      Rate and Rhythm: Normal rate and regular rhythm.      Pulses: Normal pulses.   Pulmonary:      Effort: Pulmonary effort is normal. No respiratory distress.      Breath sounds: No wheezing, rhonchi or rales.   Abdominal:      General: Bowel sounds are normal.      Palpations: Abdomen is soft.      Tenderness: There is no guarding.      Hernia: No hernia is present.   Musculoskeletal:         General: Normal range of motion.      Cervical back: Neck supple.      Right lower leg: No edema.      Left lower leg: No edema.      Comments: Bilateral lumbar tenderness.  Strength 5 out of 5.  However gait needs assistance with a cane   Skin:     General: Skin is warm.      Capillary Refill: Capillary refill takes less than 2 seconds.   Neurological:      General: No focal deficit present.      Mental Status: She is oriented to person, place, and time.      Cranial Nerves: No cranial nerve deficit.      Sensory: No sensory deficit.      Gait: Gait normal.      Deep Tendon Reflexes: Reflexes normal.   Psychiatric:         Mood and Affect: Mood normal.         Behavior: Behavior normal.         Judgment: Judgment normal.         Assessment/Plan   Problem List Items Addressed This Visit       Mixed hyperlipidemia    Relevant Orders    CBC    Acute right-sided low back pain without sciatica    Relevant Orders    Referral to Pain Medicine     Other Visit Diagnoses       Routine general medical examination at health care facility    -  Primary    Relevant Orders    1 Year Follow Up In [facility] - PCP - Wellness Exam    CBC    Comprehensive Metabolic Panel    Lipid Panel    Breast cancer screening by mammogram        Relevant Orders      mammo bilateral screening tomosynthesis          Patient will see pain management follow-up with orthopedics.  She will get her screening blood work mammogram.  Patient's follow-up in 1 month  If any chest pain shortness of breath any nausea vomiting or fever headache any sore symptoms she will go to ER

## 2024-07-22 ENCOUNTER — APPOINTMENT (OUTPATIENT)
Dept: PAIN MEDICINE | Facility: CLINIC | Age: 74
End: 2024-07-22
Payer: MEDICARE

## 2024-08-13 ENCOUNTER — OFFICE VISIT (OUTPATIENT)
Dept: PAIN MEDICINE | Facility: CLINIC | Age: 74
End: 2024-08-13
Payer: MEDICARE

## 2024-08-13 VITALS
HEART RATE: 108 BPM | TEMPERATURE: 97.5 F | SYSTOLIC BLOOD PRESSURE: 155 MMHG | HEIGHT: 67 IN | RESPIRATION RATE: 14 BRPM | OXYGEN SATURATION: 96 % | BODY MASS INDEX: 33.74 KG/M2 | DIASTOLIC BLOOD PRESSURE: 91 MMHG | WEIGHT: 215 LBS

## 2024-08-13 DIAGNOSIS — G89.29 CHRONIC BILATERAL LOW BACK PAIN WITHOUT SCIATICA: ICD-10-CM

## 2024-08-13 DIAGNOSIS — M54.50 CHRONIC BILATERAL LOW BACK PAIN WITHOUT SCIATICA: ICD-10-CM

## 2024-08-13 DIAGNOSIS — M54.50 ACUTE RIGHT-SIDED LOW BACK PAIN WITHOUT SCIATICA: ICD-10-CM

## 2024-08-13 DIAGNOSIS — M16.0 PRIMARY OSTEOARTHRITIS OF BOTH HIPS: Primary | ICD-10-CM

## 2024-08-13 PROCEDURE — 3077F SYST BP >= 140 MM HG: CPT | Performed by: ANESTHESIOLOGY

## 2024-08-13 PROCEDURE — 1159F MED LIST DOCD IN RCRD: CPT | Performed by: ANESTHESIOLOGY

## 2024-08-13 PROCEDURE — 1125F AMNT PAIN NOTED PAIN PRSNT: CPT | Performed by: ANESTHESIOLOGY

## 2024-08-13 PROCEDURE — 99204 OFFICE O/P NEW MOD 45 MIN: CPT | Performed by: ANESTHESIOLOGY

## 2024-08-13 PROCEDURE — 1036F TOBACCO NON-USER: CPT | Performed by: ANESTHESIOLOGY

## 2024-08-13 PROCEDURE — 1157F ADVNC CARE PLAN IN RCRD: CPT | Performed by: ANESTHESIOLOGY

## 2024-08-13 PROCEDURE — 3080F DIAST BP >= 90 MM HG: CPT | Performed by: ANESTHESIOLOGY

## 2024-08-13 PROCEDURE — 99214 OFFICE O/P EST MOD 30 MIN: CPT | Performed by: ANESTHESIOLOGY

## 2024-08-13 RX ORDER — TRAMADOL HYDROCHLORIDE 50 MG/1
50 TABLET ORAL 2 TIMES DAILY PRN
Qty: 15 TABLET | Refills: 0 | Status: SHIPPED | OUTPATIENT
Start: 2024-08-13 | End: 2024-08-21

## 2024-08-13 RX ORDER — NALOXONE HYDROCHLORIDE 4 MG/.1ML
1 SPRAY NASAL AS NEEDED
Qty: 2 EACH | Refills: 0 | Status: SHIPPED | OUTPATIENT
Start: 2024-08-13

## 2024-08-13 RX ORDER — CYCLOBENZAPRINE HCL 5 MG
5-10 TABLET ORAL NIGHTLY
Qty: 60 TABLET | Refills: 0 | Status: SHIPPED | OUTPATIENT
Start: 2024-08-13 | End: 2024-11-11

## 2024-08-13 SDOH — ECONOMIC STABILITY: FOOD INSECURITY: WITHIN THE PAST 12 MONTHS, YOU WORRIED THAT YOUR FOOD WOULD RUN OUT BEFORE YOU GOT MONEY TO BUY MORE.: NEVER TRUE

## 2024-08-13 SDOH — ECONOMIC STABILITY: FOOD INSECURITY: WITHIN THE PAST 12 MONTHS, THE FOOD YOU BOUGHT JUST DIDN'T LAST AND YOU DIDN'T HAVE MONEY TO GET MORE.: NEVER TRUE

## 2024-08-13 ASSESSMENT — PATIENT HEALTH QUESTIONNAIRE - PHQ9
SUM OF ALL RESPONSES TO PHQ9 QUESTIONS 1 AND 2: 0
1. LITTLE INTEREST OR PLEASURE IN DOING THINGS: NOT AT ALL
2. FEELING DOWN, DEPRESSED OR HOPELESS: NOT AT ALL

## 2024-08-13 ASSESSMENT — LIFESTYLE VARIABLES
HOW MANY STANDARD DRINKS CONTAINING ALCOHOL DO YOU HAVE ON A TYPICAL DAY: 1 OR 2
HOW OFTEN DO YOU HAVE A DRINK CONTAINING ALCOHOL: MONTHLY OR LESS
AUDIT-C TOTAL SCORE: 1
TOTAL SCORE: 0
HOW OFTEN DO YOU HAVE SIX OR MORE DRINKS ON ONE OCCASION: NEVER
SKIP TO QUESTIONS 9-10: 1

## 2024-08-13 ASSESSMENT — ENCOUNTER SYMPTOMS
DIZZINESS: 1
SHORTNESS OF BREATH: 1
LOSS OF SENSATION IN FEET: 0
DIFFICULTY URINATING: 0
EYE PAIN: 0
BACK PAIN: 1
DEPRESSION: 0
OCCASIONAL FEELINGS OF UNSTEADINESS: 1
BLOOD IN STOOL: 0
ADENOPATHY: 0
FEVER: 0

## 2024-08-13 ASSESSMENT — COLUMBIA-SUICIDE SEVERITY RATING SCALE - C-SSRS
6. HAVE YOU EVER DONE ANYTHING, STARTED TO DO ANYTHING, OR PREPARED TO DO ANYTHING TO END YOUR LIFE?: NO
2. HAVE YOU ACTUALLY HAD ANY THOUGHTS OF KILLING YOURSELF?: NO
1. IN THE PAST MONTH, HAVE YOU WISHED YOU WERE DEAD OR WISHED YOU COULD GO TO SLEEP AND NOT WAKE UP?: NO

## 2024-08-13 ASSESSMENT — PAIN SCALES - GENERAL: PAINLEVEL: 9

## 2024-08-13 NOTE — PROGRESS NOTES
"Chief Complain    New Patient Visit and Back Pain (Left lower back pain, this started over 1 year ago, patient was in PT but was unable to finish due to pain. 9/10)    History Of Present Illness    Anita Calabrese is a 73 y.o. female here for evaluation of bilateral back/buttock pain, radiating to bilateral groin worse in the left side. The patient has been experiencing these symptoms for last 1 year(s). The patient describes the pain as \"pure pain\". The patient's current pain score is 8-9 on a scale from 0-10. The pain is worsened by standing and walking and is alleviated by nothing relieves the pain. Since the start of the symptoms the pain has been worse.    The patient denies any fever, chills, weight loss, weakness, numbness, bladder/ bowel incontinence, history of cancer, history of IV drug abuse, recent trauma.    Past Medical History  She has a past medical history of Atypical chest pain (05/19/2023), Primary osteoarthritis of first carpometacarpal joint of left hand (02/15/2018), RSD (reflex sympathetic dystrophy) (05/19/2023), and Wheezing (06/11/2021).    Surgical History  She has a past surgical history that includes Other surgical history (06/12/2019); Other surgical history (06/12/2019); Other surgical history (06/12/2019); and Other surgical history (06/12/2019).    Social History  She reports that she quit smoking about 18 years ago. Her smoking use included cigarettes. She has been exposed to tobacco smoke. She has never used smokeless tobacco. She reports that she does not currently use alcohol. She reports that she does not use drugs.    Family History  Family History   Problem Relation Name Age of Onset    Cancer Mother      Heart disease Father          Allergies  Corticosteroids (glucocorticoids), Aspirin, Azithromycin, Diclofenac, Penicillins, Prednisone, and Salicylates    Review of Systems  Review of Systems   Constitutional:  Negative for fever.   HENT:  Negative for ear pain.    Eyes:  " "Negative for pain.   Respiratory:  Positive for shortness of breath.    Cardiovascular:  Negative for chest pain.   Gastrointestinal:  Negative for blood in stool.   Endocrine: Negative for cold intolerance and heat intolerance.   Genitourinary:  Negative for difficulty urinating.   Musculoskeletal:  Positive for back pain.   Skin:  Negative for rash.   Allergic/Immunologic: Negative for food allergies.   Neurological:  Positive for dizziness.   Hematological:  Negative for adenopathy.   Psychiatric/Behavioral:  Negative for suicidal ideas.         Physical Exam  Physical Exam  Constitutional:       Appearance: Normal appearance.   HENT:      Head: Normocephalic and atraumatic.   Eyes:      Extraocular Movements: Extraocular movements intact.      Pupils: Pupils are equal, round, and reactive to light.   Cardiovascular:      Rate and Rhythm: Normal rate and regular rhythm.   Pulmonary:      Effort: Pulmonary effort is normal.   Abdominal:      Palpations: Abdomen is soft.   Musculoskeletal:      Cervical back: Neck supple.      Lumbar back: No swelling, edema, deformity, spasms, tenderness or bony tenderness. Decreased range of motion. No scoliosis.        Legs:    Skin:     General: Skin is warm.   Neurological:      Mental Status: She is alert and oriented to person, place, and time.      Motor: Motor function is intact.      Deep Tendon Reflexes:      Reflex Scores:       Patellar reflexes are 1+ on the right side and 1+ on the left side.       Achilles reflexes are 1+ on the right side and 1+ on the left side.     Comments: Antalgic gait   Psychiatric:         Mood and Affect: Mood normal.         Behavior: Behavior normal.           Last Recorded Vitals  Blood pressure (!) 155/91, pulse 108, temperature 36.4 °C (97.5 °F), temperature source Temporal, resp. rate 14, height 1.689 m (5' 6.5\"), weight 97.5 kg (215 lb), SpO2 96%.      Reviewed Labs   Latest Reference Range & Units 02/06/24 15:38   GLUCOSE 74 - 99 " mg/dL 118 (H)   SODIUM 136 - 145 mmol/L 139   POTASSIUM 3.5 - 5.3 mmol/L 3.8   CHLORIDE 98 - 107 mmol/L 103   Bicarbonate 21 - 32 mmol/L 28   Anion Gap 10 - 20 mmol/L 12   Blood Urea Nitrogen 6 - 23 mg/dL 21   Creatinine 0.50 - 1.05 mg/dL 1.37 (H)   EGFR >60 mL/min/1.73m*2 40 (L)   Calcium 8.6 - 10.3 mg/dL 10.2   Albumin 3.4 - 5.0 g/dL 4.4   Alkaline Phosphatase 33 - 136 U/L 57   ALT 7 - 45 U/L 14   AST 9 - 39 U/L 14   Bilirubin Total 0.0 - 1.2 mg/dL 0.7   (H): Data is abnormally high  (L): Data is abnormally low      Latest Reference Range & Units 10/23/23 12:50   LEUKOCYTES (10*3/UL) IN BLOOD BY AUTOMATED COUNT, [place] 4.4 - 11.3 x10*3/uL 7.1   nRBC 0.0 - 0.0 /100 WBCs 0.0   ERYTHROCYTES (10*6/UL) IN BLOOD BY AUTOMATED COUNT, [place] 4.00 - 5.20 x10*6/uL 3.85 (L)   HEMOGLOBIN 12.0 - 16.0 g/dL 12.0   HEMATOCRIT 36.0 - 46.0 % 37.9   MCV 80 - 100 fL 98   MCH 26.0 - 34.0 pg 31.2   MCHC 32.0 - 36.0 g/dL 31.7 (L)   RED CELL DISTRIBUTION WIDTH 11.5 - 14.5 % 14.7 (H)   PLATELETS (10*3/UL) IN BLOOD AUTOMATED COUNT, [place] 150 - 450 x10*3/uL 261   MEAN PLATELET VOLUME 7.5 - 11.5 fL 10.1   NEUTROPHILS/100 LEUKOCYTES IN BLOOD BY AUTOMATED COUNT, [place] 40.0 - 80.0 % 58.3   Immature Granulocytes %, Automated 0.0 - 0.9 % 1.1 (H)   Lymphocytes % 13.0 - 44.0 % 31.0   Monocytes % 2.0 - 10.0 % 7.2   Eosinophils % 0.0 - 6.0 % 1.6   Basophils % 0.0 - 2.0 % 0.8   NEUTROPHILS (10*3/UL) IN BLOOD BY AUTOMATED COUNT, [place] 1.60 - 5.50 x10*3/uL 4.11   Immature Granulocytes Absolute, Automated 0.00 - 0.50 x10*3/uL 0.08   Lymphocytes Absolute 0.80 - 3.00 x10*3/uL 2.19   Monocytes Absolute 0.05 - 0.80 x10*3/uL 0.51   Eosinophils Absolute 0.00 - 0.40 x10*3/uL 0.11   Basophils Absolute 0.00 - 0.10 x10*3/uL 0.06   (L): Data is abnormally low  (H): Data is abnormally high  Assessment/Plan   Encounter Diagnoses   Name Primary?    Acute right-sided low back pain without sciatica     Primary osteoarthritis of both hips Yes    Chronic  bilateral low back pain without sciatica         Anita Calabrese is a 73 y.o. female here for evaluation of chronic low back pain, bilateral buttock pain radiating to groin worse on the left side.  She has been experiencing the symptoms for last 1 year or so has been gradual getting worse.  She has been evaluated by Dr. Cary at [facility] who did bilateral hip injection without any significant benefit.  Subsequently she was referred for physical therapy she did 4 sessions however she was unable to tolerate it that due to pain.  Currently she is not taking any medications for pain, previously tried gabapentin with limited benefit.  She is unable to take NSAIDs due to her worsening kidney function.  Review of report of x-ray pelvis done recently reveals advanced arthritic changes, she is also scheduled for an MRI on 3 September at [facility].  Differential diagnosis for her pain would include spinal stenosis, osteoarthritic changes of her hip joints.  I would recommend referral to orthopedics for consideration of hip arthroplasty if indicated, will trial her on Flexeril at night to help her sleep, short prescription of tramadol was provided to help with the pain during the daytime take occasionally for severe pain.  Rest of the management after reviewing the results of MRI lumbar spine.  Discussed risk associated with narcotics including but not limited to addiction, dependence, respiratory depression, death, mental health issues, hormonal changes.  I have personally reviewed the OARRS report. This report is scanned into the electronic medical record. I have considered the risks of abuse, dependence, addiction and diversion.  opiate agreement was signed by the patient.           Ben Byrd MD

## 2024-08-22 ENCOUNTER — OFFICE VISIT (OUTPATIENT)
Dept: ORTHOPEDIC SURGERY | Facility: CLINIC | Age: 74
End: 2024-08-22
Payer: MEDICARE

## 2024-08-22 VITALS — BODY MASS INDEX: 34.53 KG/M2 | WEIGHT: 220 LBS | HEIGHT: 67 IN

## 2024-08-22 DIAGNOSIS — M25.551 BILATERAL HIP PAIN: ICD-10-CM

## 2024-08-22 DIAGNOSIS — M25.552 BILATERAL HIP PAIN: ICD-10-CM

## 2024-08-22 DIAGNOSIS — M16.0 PRIMARY OSTEOARTHRITIS OF BOTH HIPS: ICD-10-CM

## 2024-08-22 PROCEDURE — 1160F RVW MEDS BY RX/DR IN RCRD: CPT | Performed by: ORTHOPAEDIC SURGERY

## 2024-08-22 PROCEDURE — 1159F MED LIST DOCD IN RCRD: CPT | Performed by: ORTHOPAEDIC SURGERY

## 2024-08-22 PROCEDURE — 1036F TOBACCO NON-USER: CPT | Performed by: ORTHOPAEDIC SURGERY

## 2024-08-22 PROCEDURE — 99213 OFFICE O/P EST LOW 20 MIN: CPT | Performed by: ORTHOPAEDIC SURGERY

## 2024-08-22 PROCEDURE — 1157F ADVNC CARE PLAN IN RCRD: CPT | Performed by: ORTHOPAEDIC SURGERY

## 2024-08-22 NOTE — PROGRESS NOTES
Subjective    Patient ID: Anita Calabrese is a 73 y.o. female.    Chief Complaint: Pain of the Right Hip and Pain of the Left Hip       This is a 73-year-old female who arrives today to a referral from pain management.  Patient has struggled with bilateral hip pain presently left worse than right for over the past year and 1/2 to 2 years.  She notes difficulty rising out of a chair and getting in and out of an automobile.  She can walk short distances with use of a cane but longer distances she does use a wheelchair such as coming to the office today.  She recently was placed on tramadol by pain management.  She has had previous injections into her hip with the last one being a few months ago into the left side which was a very limited benefit.  She is also tried weight reduction without success.  She cannot tolerate nonsteroid anti-inflammatory drugs secondary to a rising creatinine level    This patient's past medical, social, and family history were reviewed as well as a review of systems including updates on the patient's information encounter sheet  Patient does deny a history of diabetes  Patient has had previous right knee replacement in the remote past and left knee replacement a year ago when she was in [place]    Physical Examination  Constitutional: Patient's height and weight reviewed, appears well kempt, moderately obese found seated in a wheelchair  Psychiatric: Alert and oriented ×3, appropriate mood and behavior  Pulmonary: Breathing appears nonlabored, no apparent distress  Lymphatic: No appreciable lymphadenopathy to both the upper and lower extremities  Skin: No open lesions, rashes, ulcerations  Neurologic: Gross motor and sensory exam appear intact (except for abnormalities noted in the below muscle skeletal exam)    Musculoskeletal: Each hip is well aligned without rotation deformity nor shortening.  Each hip has limited flexion to 90 degrees.  Attempts and internal rotation of each hip  recreates significant groin pain greater on the left side than the right side.  No gross motor deficit identified.    Recent x-rays performed at [facility] reviewed including an AP view of the pelvis.  Severe arthritic changes are noted of each hip with joint space narrowing and subchondral cyst formation    Assessment: Bilateral hip arthritis left worse than right refractory conservative treatment    Plan: I have explained to the patient there is little more I could offer her in a conservative fashion for management of her condition especially with her kidney disease.  Based on the fact she has failed injections as before suggested referral for surgical consultation regarding hip replacement./mL          Current Outpatient Medications:     albuterol 90 mcg/actuation inhaler, Inhale 2 puffs 4 times a day., Disp: , Rfl:     aspirin 81 mg chewable tablet, Chew 1 tablet (81 mg) once daily., Disp: , Rfl:     atorvastatin (Lipitor) 40 mg tablet, Take 1 tablet (40 mg) by mouth once daily., Disp: 90 tablet, Rfl: 3    benzonatate (Tessalon) 100 mg capsule, Take 1 capsule (100 mg) by mouth 3 times a day as needed for cough., Disp: , Rfl:     celecoxib (CeleBREX) 200 mg capsule, Take 1 capsule (200 mg) by mouth once daily., Disp: , Rfl:     cholecalciferol (Vitamin D-3) 1,250 mcg (50,000 unit) capsule, Take 1 capsule (50,000 Units) by mouth 1 (one) time per week., Disp: , Rfl:     cyclobenzaprine (Flexeril) 5 mg tablet, Take 1-2 tablets (5-10 mg) by mouth once daily at bedtime., Disp: 60 tablet, Rfl: 0    famotidine (Pepcid) 20 mg tablet, TAKE ONE TABLET BY MOUTH TWO TIMES A DAY, Disp: 60 tablet, Rfl: 3    folic acid (Folvite) 1 mg tablet, Take 1 tablet (1 mg) by mouth once daily., Disp: , Rfl:     gabapentin (Neurontin) 600 mg tablet, 1 tablet (600 mg)., Disp: , Rfl:     hydroCHLOROthiazide (HYDRODiuril) 25 mg tablet, TAKE 2 TABLETS(50 MG) BY MOUTH EVERY DAY, Disp: 180 tablet, Rfl: 1    hydroxychloroquine (Plaquenil) 200  mg tablet, Take 1 tablet (200 mg) by mouth 2 times a day., Disp: , Rfl:     lansoprazole (Prevacid) 30 mg DR capsule, Take 1 capsule (30 mg) by mouth once daily. Do not crush or chew., Disp: 90 capsule, Rfl: 3    losartan (Cozaar) 100 mg tablet, Take 1 tablet (100 mg) by mouth once daily., Disp: 90 tablet, Rfl: 1    methotrexate (Trexall) 2.5 mg tablet, Take 1 tablet (2.5 mg total) by mouth 1 (one) time per week., Disp: , Rfl:     naloxone (Narcan) 4 mg/0.1 mL nasal spray, Administer 1 spray (4 mg) into affected nostril(s) if needed for opioid reversal. May repeat every 2-3 minutes if needed, alternating nostrils, until medical assistance becomes available., Disp: 2 each, Rfl: 0    nitroglycerin (Nitrostat) 0.4 mg SL tablet, Place 1 tablet (0.4 mg) under the tongue every 5 minutes if needed., Disp: , Rfl:

## 2024-09-10 ENCOUNTER — OFFICE VISIT (OUTPATIENT)
Dept: PAIN MEDICINE | Facility: CLINIC | Age: 74
End: 2024-09-10
Payer: MEDICARE

## 2024-09-10 VITALS
HEIGHT: 67 IN | OXYGEN SATURATION: 97 % | WEIGHT: 220 LBS | TEMPERATURE: 97.2 F | HEART RATE: 106 BPM | SYSTOLIC BLOOD PRESSURE: 177 MMHG | DIASTOLIC BLOOD PRESSURE: 90 MMHG | BODY MASS INDEX: 34.53 KG/M2 | RESPIRATION RATE: 16 BRPM

## 2024-09-10 DIAGNOSIS — M16.0 PRIMARY OSTEOARTHRITIS OF BOTH HIPS: Primary | ICD-10-CM

## 2024-09-10 PROCEDURE — 99214 OFFICE O/P EST MOD 30 MIN: CPT | Performed by: ANESTHESIOLOGY

## 2024-09-10 PROCEDURE — 1125F AMNT PAIN NOTED PAIN PRSNT: CPT | Performed by: ANESTHESIOLOGY

## 2024-09-10 PROCEDURE — 3080F DIAST BP >= 90 MM HG: CPT | Performed by: ANESTHESIOLOGY

## 2024-09-10 PROCEDURE — 1157F ADVNC CARE PLAN IN RCRD: CPT | Performed by: ANESTHESIOLOGY

## 2024-09-10 PROCEDURE — 3077F SYST BP >= 140 MM HG: CPT | Performed by: ANESTHESIOLOGY

## 2024-09-10 RX ORDER — TRAMADOL HYDROCHLORIDE 50 MG/1
75 TABLET ORAL 2 TIMES DAILY PRN
Qty: 20 TABLET | Refills: 0 | Status: SHIPPED | OUTPATIENT
Start: 2024-09-10 | End: 2024-09-17

## 2024-09-10 ASSESSMENT — ENCOUNTER SYMPTOMS
ADENOPATHY: 0
LOSS OF SENSATION IN FEET: 0
OCCASIONAL FEELINGS OF UNSTEADINESS: 1
FEVER: 0
WEAKNESS: 1
BLOOD IN STOOL: 0
BACK PAIN: 1
EYE PAIN: 0
SHORTNESS OF BREATH: 1
DEPRESSION: 0

## 2024-09-10 ASSESSMENT — PATIENT HEALTH QUESTIONNAIRE - PHQ9
2. FEELING DOWN, DEPRESSED OR HOPELESS: NOT AT ALL
1. LITTLE INTEREST OR PLEASURE IN DOING THINGS: NOT AT ALL
SUM OF ALL RESPONSES TO PHQ9 QUESTIONS 1 AND 2: 0

## 2024-09-10 ASSESSMENT — PAIN - FUNCTIONAL ASSESSMENT: PAIN_FUNCTIONAL_ASSESSMENT: 0-10

## 2024-09-10 ASSESSMENT — PAIN SCALES - GENERAL
PAINLEVEL_OUTOF10: 8
PAINLEVEL: 8

## 2024-09-10 NOTE — PROGRESS NOTES
"Chief Complain    Follow-up (8/10 Hip and spine pain, go over results of MRI)    History Of Present Illness    Anita Calabrese is a 73 y.o. female here for evaluation of bilateral back/buttock pain, radiating to bilateral groin worse in the left side. The patient has been experiencing these symptoms for last 1 year(s). The patient describes the pain as \"pure pain\". The patient's current pain score is 8-9 on a scale from 0-10. The pain is worsened by standing and walking and is alleviated by nothing relieves the pain. Since the start of the symptoms the pain has been worse.    The patient denies any fever, chills, weight loss, weakness, numbness, bladder/ bowel incontinence, history of cancer, history of IV drug abuse, recent trauma.    Past Medical History  She has a past medical history of Atypical chest pain (05/19/2023), Primary osteoarthritis of first carpometacarpal joint of left hand (02/15/2018), RSD (reflex sympathetic dystrophy) (05/19/2023), and Wheezing (06/11/2021).    Surgical History  She has a past surgical history that includes Other surgical history (06/12/2019); Other surgical history (06/12/2019); Other surgical history (06/12/2019); and Other surgical history (06/12/2019).    Social History  She reports that she quit smoking about 18 years ago. Her smoking use included cigarettes. She has been exposed to tobacco smoke. She has never used smokeless tobacco. She reports that she does not currently use alcohol. She reports that she does not use drugs.    Family History  Family History   Problem Relation Name Age of Onset    Cancer Mother      Heart disease Father          Allergies  Corticosteroids (glucocorticoids), Aspirin, Azithromycin, Diclofenac, Penicillins, Prednisone, and Salicylates    Review of Systems  Review of Systems   Constitutional:  Negative for fever.   HENT:  Negative for ear pain.    Eyes:  Negative for pain.   Respiratory:  Positive for shortness of breath.  " "  Cardiovascular:  Negative for chest pain.   Gastrointestinal:  Negative for blood in stool.   Endocrine: Negative for cold intolerance and heat intolerance.   Genitourinary:  Positive for urgency.   Musculoskeletal:  Positive for back pain.   Allergic/Immunologic: Negative for food allergies.   Neurological:  Positive for weakness.   Hematological:  Negative for adenopathy.   Psychiatric/Behavioral:  Negative for suicidal ideas.         Physical Exam  Physical Exam  Constitutional:       Appearance: Normal appearance.   HENT:      Head: Normocephalic and atraumatic.   Eyes:      Extraocular Movements: Extraocular movements intact.      Pupils: Pupils are equal, round, and reactive to light.   Cardiovascular:      Rate and Rhythm: Regular rhythm.   Pulmonary:      Effort: Pulmonary effort is normal.   Abdominal:      Palpations: Abdomen is soft.   Musculoskeletal:        Legs:    Skin:     General: Skin is warm.   Neurological:      General: No focal deficit present.      Mental Status: She is alert and oriented to person, place, and time.      Motor: Motor function is intact.      Deep Tendon Reflexes:      Reflex Scores:       Patellar reflexes are 2+ on the right side and 2+ on the left side.       Achilles reflexes are 2+ on the right side and 2+ on the left side.  Psychiatric:         Mood and Affect: Mood normal.           Last Recorded Vitals  Blood pressure 177/90, pulse 106, temperature 36.2 °C (97.2 °F), temperature source Temporal, resp. rate 16, height 1.689 m (5' 6.5\"), weight 99.8 kg (220 lb), SpO2 97%.      Reviewed Labs   Latest Reference Range & Units 02/06/24 15:38   GLUCOSE 74 - 99 mg/dL 118 (H)   SODIUM 136 - 145 mmol/L 139   POTASSIUM 3.5 - 5.3 mmol/L 3.8   CHLORIDE 98 - 107 mmol/L 103   Bicarbonate 21 - 32 mmol/L 28   Anion Gap 10 - 20 mmol/L 12   Blood Urea Nitrogen 6 - 23 mg/dL 21   Creatinine 0.50 - 1.05 mg/dL 1.37 (H)   EGFR >60 mL/min/1.73m*2 40 (L)   Calcium 8.6 - 10.3 mg/dL 10.2 "   Albumin 3.4 - 5.0 g/dL 4.4   Alkaline Phosphatase 33 - 136 U/L 57   ALT 7 - 45 U/L 14   AST 9 - 39 U/L 14   Bilirubin Total 0.0 - 1.2 mg/dL 0.7   (H): Data is abnormally high  (L): Data is abnormally low      Latest Reference Range & Units 10/23/23 12:50   LEUKOCYTES (10*3/UL) IN BLOOD BY AUTOMATED COUNT, Malaysian 4.4 - 11.3 x10*3/uL 7.1   nRBC 0.0 - 0.0 /100 WBCs 0.0   ERYTHROCYTES (10*6/UL) IN BLOOD BY AUTOMATED COUNT, Malaysian 4.00 - 5.20 x10*6/uL 3.85 (L)   HEMOGLOBIN 12.0 - 16.0 g/dL 12.0   HEMATOCRIT 36.0 - 46.0 % 37.9   MCV 80 - 100 fL 98   MCH 26.0 - 34.0 pg 31.2   MCHC 32.0 - 36.0 g/dL 31.7 (L)   RED CELL DISTRIBUTION WIDTH 11.5 - 14.5 % 14.7 (H)   PLATELETS (10*3/UL) IN BLOOD AUTOMATED COUNT, [place] 150 - 450 x10*3/uL 261   MEAN PLATELET VOLUME 7.5 - 11.5 fL 10.1   NEUTROPHILS/100 LEUKOCYTES IN BLOOD BY AUTOMATED COUNT, [place] 40.0 - 80.0 % 58.3   Immature Granulocytes %, Automated 0.0 - 0.9 % 1.1 (H)   Lymphocytes % 13.0 - 44.0 % 31.0   Monocytes % 2.0 - 10.0 % 7.2   Eosinophils % 0.0 - 6.0 % 1.6   Basophils % 0.0 - 2.0 % 0.8   NEUTROPHILS (10*3/UL) IN BLOOD BY AUTOMATED COUNT, [place] 1.60 - 5.50 x10*3/uL 4.11   Immature Granulocytes Absolute, Automated 0.00 - 0.50 x10*3/uL 0.08   Lymphocytes Absolute 0.80 - 3.00 x10*3/uL 2.19   Monocytes Absolute 0.05 - 0.80 x10*3/uL 0.51   Eosinophils Absolute 0.00 - 0.40 x10*3/uL 0.11   Basophils Absolute 0.00 - 0.10 x10*3/uL 0.06   (L): Data is abnormally low  (H): Data is abnormally high  Assessment/Plan   Encounter Diagnosis   Name Primary?    Primary osteoarthritis of both hips Yes          Anita Calabrese is a 73 y.o. female here for evaluation of chronic low back pain, bilateral buttock pain radiating to groin worse on the left side.  She has been experiencing the symptoms for last 1 year or so has been gradual getting worse.  She has been evaluated by Dr. Cary at [facility] who did bilateral hip injection without any significant benefit.  Subsequently she  was referred for physical therapy she did 4 sessions however she was unable to tolerate it that due to pain.   She is unable to take NSAIDs due to her worsening kidney function.  Review of report of x-ray pelvis done recently reveals advanced arthritic changes.  Reviewed the report of the MRI lumbar spine done at [facility] which did not any severe spinal canal stenosis he did have some degenerative changes at L5-S1.  Her pain is likely originating from her hips, ask her to get in touch with her orthopedic surgeon for consideration of hip arthroplasty.  Last visit she was prescribed tramadol which helped her very little.  I would up the dose to 75 mg every 12 hours as needed.  I have personally reviewed the [facility] report.  I have considered the risks of abuse, dependence, addiction and diversion.       Ben Byrd MD

## 2024-10-14 ENCOUNTER — APPOINTMENT (OUTPATIENT)
Dept: PAIN MEDICINE | Facility: CLINIC | Age: 74
End: 2024-10-14
Payer: MEDICARE

## 2024-10-15 ENCOUNTER — APPOINTMENT (OUTPATIENT)
Dept: PAIN MEDICINE | Facility: CLINIC | Age: 74
End: 2024-10-15
Payer: MEDICARE

## 2024-10-17 ENCOUNTER — APPOINTMENT (OUTPATIENT)
Dept: CARDIOLOGY | Facility: CLINIC | Age: 74
End: 2024-10-17
Payer: MEDICARE

## 2024-10-17 VITALS
OXYGEN SATURATION: 97 % | BODY MASS INDEX: 33.74 KG/M2 | HEIGHT: 67 IN | HEART RATE: 96 BPM | WEIGHT: 215 LBS | DIASTOLIC BLOOD PRESSURE: 82 MMHG | SYSTOLIC BLOOD PRESSURE: 164 MMHG

## 2024-10-17 DIAGNOSIS — I25.10 ATHEROSCLEROSIS OF NATIVE CORONARY ARTERY OF NATIVE HEART WITHOUT ANGINA PECTORIS: ICD-10-CM

## 2024-10-17 DIAGNOSIS — I10 BENIGN ESSENTIAL HYPERTENSION: ICD-10-CM

## 2024-10-17 DIAGNOSIS — R06.09 DOE (DYSPNEA ON EXERTION): ICD-10-CM

## 2024-10-17 DIAGNOSIS — Z01.818 PRE-OPERATIVE CLEARANCE: Primary | ICD-10-CM

## 2024-10-17 PROBLEM — I34.1 MVP (MITRAL VALVE PROLAPSE): Status: RESOLVED | Noted: 2023-05-19 | Resolved: 2024-10-17

## 2024-10-17 PROCEDURE — 1036F TOBACCO NON-USER: CPT | Performed by: NURSE PRACTITIONER

## 2024-10-17 PROCEDURE — 1160F RVW MEDS BY RX/DR IN RCRD: CPT | Performed by: NURSE PRACTITIONER

## 2024-10-17 PROCEDURE — 3079F DIAST BP 80-89 MM HG: CPT | Performed by: NURSE PRACTITIONER

## 2024-10-17 PROCEDURE — 93000 ELECTROCARDIOGRAM COMPLETE: CPT | Performed by: NURSE PRACTITIONER

## 2024-10-17 PROCEDURE — 1159F MED LIST DOCD IN RCRD: CPT | Performed by: NURSE PRACTITIONER

## 2024-10-17 PROCEDURE — 3077F SYST BP >= 140 MM HG: CPT | Performed by: NURSE PRACTITIONER

## 2024-10-17 PROCEDURE — 99214 OFFICE O/P EST MOD 30 MIN: CPT | Performed by: NURSE PRACTITIONER

## 2024-10-17 PROCEDURE — 1157F ADVNC CARE PLAN IN RCRD: CPT | Performed by: NURSE PRACTITIONER

## 2024-10-17 NOTE — PATIENT INSTRUCTIONS
- the elevation in your blood pressure may be related to your pain. Continue down the road with orthopedics to get pain relief  - can try to take your 2 pills of hydrochlorothiazide at once. Take hydrochlorothiazide 50mg once a day in the morning    It was my pleasure to meet you. I look forward to being your cardiac Nurse Practitioner. I am a huge believer in communicating with my patients. Please contact me at any time, if anything is not clear to you regarding anything we have discussed, or if new questions occur to you.

## 2024-10-17 NOTE — PROGRESS NOTES
Name : Anita Calabrese   : 1950   MRN : [id number]   ENC Date : 10/17/2024    CC: preoperative clearance     HPI:    Ms Calabrese is a 74 y.o. obese AA female with a history of hypertension, dyslipidemia, coronary artery disease based on CT scan of the chest, Former 40 pack year smoker & COPD who presents today as above.    Still has SORIA this is unchanged. Recent echocardiogram showed preserved EF & no significant valvular abnormalities & her Lexiscan was normal.    Hypertensive today but is in a lot of pain constantly     CV Diagnostics:  Lexiscan 24: no e/o ischemia nor prior infarct, EF 65%    Echo 3/15/24: EF 55-60%, impaired relaxation    ROS: unless otherwise noted in the history of present illness, all other systems were reviewed and they are negative for complaints     Allergies:  Corticosteroids (glucocorticoids), Aspirin, Azithromycin, Diclofenac, Penicillins, Prednisone, and Salicylates    Current Outpatient Medications   Medication Instructions    albuterol 90 mcg/actuation inhaler 2 puffs, 4 times daily RT    aspirin 81 mg, Daily RT    atorvastatin (LIPITOR) 40 mg, oral, Daily    benzonatate (TESSALON) 100 mg, 3 times daily PRN    cyclobenzaprine (FLEXERIL) 5-10 mg, oral, Nightly    famotidine (PEPCID) 20 mg, oral, 2 times daily    folic acid (FOLVITE) 1 mg, Daily    gabapentin (NEURONTIN) 600 mg    hydroCHLOROthiazide (HYDRODiuril) 25 mg tablet TAKE 2 TABLETS(50 MG) BY MOUTH EVERY DAY    hydroxychloroquine (Plaquenil) 200 mg tablet 1 tablet, 2 times daily    lansoprazole (PREVACID) 30 mg, oral, Daily, Do not crush or chew.    losartan (COZAAR) 100 mg, oral, Daily    naloxone (NARCAN) 4 mg, nasal, As needed, May repeat every 2-3 minutes if needed, alternating nostrils, until medical assistance becomes available.    nitroglycerin (NITROSTAT) 0.4 mg, Every 5 min PRN        Last Labs:  CBC  Lab Results   Component Value Date    WBC 7.1 10/23/2023    HGB 12.0 10/23/2023    HCT 37.9 10/23/2023  "   MCV 98 10/23/2023     10/23/2023       CMP  Lab Results   Component Value Date    CALCIUM 10.2 02/06/2024    PROT 7.8 02/06/2024    ALBUMIN 4.4 02/06/2024    AST 14 02/06/2024    ALT 14 02/06/2024    ALKPHOS 57 02/06/2024    BILITOT 0.7 02/06/2024       BMP   Lab Results   Component Value Date     02/06/2024    K 3.8 02/06/2024     02/06/2024    CO2 28 02/06/2024    GLUCOSE 118 (H) 02/06/2024    BUN 21 02/06/2024    CREATININE 1.37 (H) 02/06/2024       LIPID PANEL   Lab Results   Component Value Date    CHOL 161 06/01/2022    TRIG 125 06/01/2022    HDL 62.0 06/01/2022    CHHDL 2.6 06/01/2022    LDLF 74 06/01/2022    VLDL 25 06/01/2022       RENAL FUNCTION PANEL   Lab Results   Component Value Date    GLUCOSE 118 (H) 02/06/2024     02/06/2024    K 3.8 02/06/2024     02/06/2024    CO2 28 02/06/2024    ANIONGAP 12 02/06/2024    BUN 21 02/06/2024    CREATININE 1.37 (H) 02/06/2024    CALCIUM 10.2 02/06/2024    ALBUMIN 4.4 02/06/2024        Lab Results   Component Value Date    BNP 25 10/23/2023    HGBA1C 5.1 09/30/2024       Last Recorded Vitals:  Vitals:    10/17/24 1202   BP: 164/82   BP Location: Left arm   Patient Position: Sitting   Pulse: 96   SpO2: 97%   Weight: 97.5 kg (215 lb)   Height: 1.689 m (5' 6.5\")       Physical Exam:  On exam Ms. Calabrese appears her stated age, is alert and oriented x3, and in no acute distress. Her sclera are anicteric and her oropharynx has moist mucous membranes. Her neck is supple and without thyromegaly. The JVP is ~5 cm of water above the right atrium. Her cardiac exam has regular rhythm, normal S1, S2. No S3/4. 1/6 systolic murmur RUSB Her lungs are clear to auscultation bilaterally and there is no dullness to percussion. Her abdomen is soft, nontender with normoactive bowel sounds. There is no HJR. The extremities are warm and without edema. The skin is dry. There is no rash present. The distal pulses are 2-3+ in all four extremities. Her mood " and affect are appropriate for todays encounter.     Assessment/Plan:  Coronary artery calcification. Lexiscan done this year for c/o SOB was normal. C/w risk factor modification. Continue on ASA & statin    Persistent SOB. Lexiscan as above. Echocardiogram without any significant abnormalities. Recommend follow up with PCP to assess other causes of her SOB.    Hypertension. Well controlled when I saw her in January. The change is that she is in a lot of pain all the time. Can try & take her hydrochlorothiazide 2 tabs once in the morning instead of 25mg BID which is what she has been doing. Follow up with ortho & consider seeing pain management. Follow up with Dr. Ragland    Preoperative Clearance. Left total hip arthroplasty  Based on the Revised Cardiac Risk Index, Anita Calabrese is a Class I risk with 3.9% 30 day risk of death, MI, or cardiac arrest. Anita Calabrese is an acceptable cardiac risk & is cleared for surgery     Follow up in 1 year or as needed     Tracy M Schwab, APRN-CNP

## 2024-10-30 ENCOUNTER — APPOINTMENT (OUTPATIENT)
Dept: RADIOLOGY | Facility: HOSPITAL | Age: 74
DRG: 305 | End: 2024-10-30
Payer: MEDICARE

## 2024-10-30 ENCOUNTER — APPOINTMENT (OUTPATIENT)
Dept: CARDIOLOGY | Facility: HOSPITAL | Age: 74
DRG: 305 | End: 2024-10-30
Payer: MEDICARE

## 2024-10-30 ENCOUNTER — HOSPITAL ENCOUNTER (INPATIENT)
Facility: HOSPITAL | Age: 74
LOS: 2 days | Discharge: HOME | End: 2024-11-03
Attending: EMERGENCY MEDICINE | Admitting: INTERNAL MEDICINE
Payer: MEDICARE

## 2024-10-30 DIAGNOSIS — R06.02 SHORTNESS OF BREATH: ICD-10-CM

## 2024-10-30 DIAGNOSIS — Z96.649: ICD-10-CM

## 2024-10-30 DIAGNOSIS — R07.9 CHEST PAIN, RULE OUT ACUTE MYOCARDIAL INFARCTION: ICD-10-CM

## 2024-10-30 DIAGNOSIS — I10 BENIGN ESSENTIAL HYPERTENSION: ICD-10-CM

## 2024-10-30 DIAGNOSIS — R06.09 DOE (DYSPNEA ON EXERTION): ICD-10-CM

## 2024-10-30 DIAGNOSIS — E83.42 HYPOMAGNESEMIA: Primary | ICD-10-CM

## 2024-10-30 DIAGNOSIS — Q34.1 MEDIASTINAL CYST: ICD-10-CM

## 2024-10-30 DIAGNOSIS — I15.0 RENOVASCULAR HYPERTENSION: ICD-10-CM

## 2024-10-30 DIAGNOSIS — I16.1 HYPERTENSIVE EMERGENCY: ICD-10-CM

## 2024-10-30 DIAGNOSIS — I10 ESSENTIAL (PRIMARY) HYPERTENSION: ICD-10-CM

## 2024-10-30 LAB
ALBUMIN SERPL BCP-MCNC: 3.2 G/DL (ref 3.4–5)
ALP SERPL-CCNC: 68 U/L (ref 33–136)
ALT SERPL W P-5'-P-CCNC: 8 U/L (ref 7–45)
ANION GAP SERPL CALC-SCNC: 12 MMOL/L (ref 10–20)
APPEARANCE UR: CLEAR
AST SERPL W P-5'-P-CCNC: 12 U/L (ref 9–39)
BASOPHILS # BLD AUTO: 0.05 X10*3/UL (ref 0–0.1)
BASOPHILS NFR BLD AUTO: 0.6 %
BILIRUB SERPL-MCNC: 0.9 MG/DL (ref 0–1.2)
BILIRUB UR STRIP.AUTO-MCNC: NEGATIVE MG/DL
BNP SERPL-MCNC: 63 PG/ML (ref 0–99)
BUN SERPL-MCNC: 13 MG/DL (ref 6–23)
CALCIUM SERPL-MCNC: 9.1 MG/DL (ref 8.6–10.3)
CARDIAC TROPONIN I PNL SERPL HS: 23 NG/L (ref 0–13)
CARDIAC TROPONIN I PNL SERPL HS: 27 NG/L (ref 0–13)
CARDIAC TROPONIN I PNL SERPL HS: 28 NG/L (ref 0–13)
CHLORIDE SERPL-SCNC: 101 MMOL/L (ref 98–107)
CO2 SERPL-SCNC: 30 MMOL/L (ref 21–32)
COLOR UR: NORMAL
CREAT SERPL-MCNC: 0.9 MG/DL (ref 0.5–1.05)
EGFRCR SERPLBLD CKD-EPI 2021: 66 ML/MIN/1.73M*2
EOSINOPHIL # BLD AUTO: 0.33 X10*3/UL (ref 0–0.4)
EOSINOPHIL NFR BLD AUTO: 4.2 %
ERYTHROCYTE [DISTWIDTH] IN BLOOD BY AUTOMATED COUNT: 14.9 % (ref 11.5–14.5)
GLUCOSE SERPL-MCNC: 108 MG/DL (ref 74–99)
GLUCOSE UR STRIP.AUTO-MCNC: NORMAL MG/DL
HCT VFR BLD AUTO: 29 % (ref 36–46)
HGB BLD-MCNC: 9.3 G/DL (ref 12–16)
HOLD SPECIMEN: NORMAL
IMM GRANULOCYTES # BLD AUTO: 0.09 X10*3/UL (ref 0–0.5)
IMM GRANULOCYTES NFR BLD AUTO: 1.1 % (ref 0–0.9)
KETONES UR STRIP.AUTO-MCNC: NEGATIVE MG/DL
LEUKOCYTE ESTERASE UR QL STRIP.AUTO: NEGATIVE
LYMPHOCYTES # BLD AUTO: 1.71 X10*3/UL (ref 0.8–3)
LYMPHOCYTES NFR BLD AUTO: 21.6 %
MAGNESIUM SERPL-MCNC: 1.35 MG/DL (ref 1.6–2.4)
MCH RBC QN AUTO: 30.9 PG (ref 26–34)
MCHC RBC AUTO-ENTMCNC: 32.1 G/DL (ref 32–36)
MCV RBC AUTO: 96 FL (ref 80–100)
MONOCYTES # BLD AUTO: 0.7 X10*3/UL (ref 0.05–0.8)
MONOCYTES NFR BLD AUTO: 8.8 %
NEUTROPHILS # BLD AUTO: 5.05 X10*3/UL (ref 1.6–5.5)
NEUTROPHILS NFR BLD AUTO: 63.7 %
NITRITE UR QL STRIP.AUTO: NEGATIVE
NRBC BLD-RTO: 0 /100 WBCS (ref 0–0)
PH UR STRIP.AUTO: 6.5 [PH]
PLATELET # BLD AUTO: 266 X10*3/UL (ref 150–450)
POTASSIUM SERPL-SCNC: 3.7 MMOL/L (ref 3.5–5.3)
PROT SERPL-MCNC: 6.6 G/DL (ref 6.4–8.2)
PROT UR STRIP.AUTO-MCNC: NEGATIVE MG/DL
RBC # BLD AUTO: 3.01 X10*6/UL (ref 4–5.2)
RBC # UR STRIP.AUTO: NEGATIVE /UL
SODIUM SERPL-SCNC: 139 MMOL/L (ref 136–145)
SP GR UR STRIP.AUTO: 1.01
UROBILINOGEN UR STRIP.AUTO-MCNC: NORMAL MG/DL
WBC # BLD AUTO: 7.9 X10*3/UL (ref 4.4–11.3)

## 2024-10-30 PROCEDURE — 84484 ASSAY OF TROPONIN QUANT: CPT | Performed by: INTERNAL MEDICINE

## 2024-10-30 PROCEDURE — G0378 HOSPITAL OBSERVATION PER HR: HCPCS

## 2024-10-30 PROCEDURE — 85025 COMPLETE CBC W/AUTO DIFF WBC: CPT | Performed by: NURSE PRACTITIONER

## 2024-10-30 PROCEDURE — 99285 EMERGENCY DEPT VISIT HI MDM: CPT

## 2024-10-30 PROCEDURE — 96372 THER/PROPH/DIAG INJ SC/IM: CPT | Performed by: INTERNAL MEDICINE

## 2024-10-30 PROCEDURE — 83880 ASSAY OF NATRIURETIC PEPTIDE: CPT | Performed by: NURSE PRACTITIONER

## 2024-10-30 PROCEDURE — 2500000004 HC RX 250 GENERAL PHARMACY W/ HCPCS (ALT 636 FOR OP/ED): Performed by: INTERNAL MEDICINE

## 2024-10-30 PROCEDURE — 84484 ASSAY OF TROPONIN QUANT: CPT | Performed by: NURSE PRACTITIONER

## 2024-10-30 PROCEDURE — 2500000001 HC RX 250 WO HCPCS SELF ADMINISTERED DRUGS (ALT 637 FOR MEDICARE OP): Performed by: NURSE PRACTITIONER

## 2024-10-30 PROCEDURE — 80053 COMPREHEN METABOLIC PANEL: CPT | Performed by: NURSE PRACTITIONER

## 2024-10-30 PROCEDURE — 2500000001 HC RX 250 WO HCPCS SELF ADMINISTERED DRUGS (ALT 637 FOR MEDICARE OP): Performed by: INTERNAL MEDICINE

## 2024-10-30 PROCEDURE — 96375 TX/PRO/DX INJ NEW DRUG ADDON: CPT

## 2024-10-30 PROCEDURE — 83735 ASSAY OF MAGNESIUM: CPT | Performed by: NURSE PRACTITIONER

## 2024-10-30 PROCEDURE — 71045 X-RAY EXAM CHEST 1 VIEW: CPT | Performed by: RADIOLOGY

## 2024-10-30 PROCEDURE — 36415 COLL VENOUS BLD VENIPUNCTURE: CPT | Performed by: NURSE PRACTITIONER

## 2024-10-30 PROCEDURE — 2500000004 HC RX 250 GENERAL PHARMACY W/ HCPCS (ALT 636 FOR OP/ED): Performed by: EMERGENCY MEDICINE

## 2024-10-30 PROCEDURE — 71045 X-RAY EXAM CHEST 1 VIEW: CPT

## 2024-10-30 PROCEDURE — 93010 ELECTROCARDIOGRAM REPORT: CPT | Performed by: STUDENT IN AN ORGANIZED HEALTH CARE EDUCATION/TRAINING PROGRAM

## 2024-10-30 PROCEDURE — 70450 CT HEAD/BRAIN W/O DYE: CPT | Performed by: RADIOLOGY

## 2024-10-30 PROCEDURE — 93005 ELECTROCARDIOGRAM TRACING: CPT

## 2024-10-30 PROCEDURE — 96366 THER/PROPH/DIAG IV INF ADDON: CPT

## 2024-10-30 PROCEDURE — 99223 1ST HOSP IP/OBS HIGH 75: CPT | Performed by: INTERNAL MEDICINE

## 2024-10-30 PROCEDURE — 70450 CT HEAD/BRAIN W/O DYE: CPT

## 2024-10-30 PROCEDURE — 81003 URINALYSIS AUTO W/O SCOPE: CPT | Performed by: INTERNAL MEDICINE

## 2024-10-30 PROCEDURE — 96365 THER/PROPH/DIAG IV INF INIT: CPT | Mod: 59

## 2024-10-30 RX ORDER — AMLODIPINE BESYLATE 5 MG/1
5 TABLET ORAL DAILY
Status: DISCONTINUED | OUTPATIENT
Start: 2024-10-31 | End: 2024-11-03 | Stop reason: HOSPADM

## 2024-10-30 RX ORDER — FAMOTIDINE 20 MG/1
20 TABLET, FILM COATED ORAL 2 TIMES DAILY
Status: DISCONTINUED | OUTPATIENT
Start: 2024-10-30 | End: 2024-11-03 | Stop reason: HOSPADM

## 2024-10-30 RX ORDER — OXYCODONE AND ACETAMINOPHEN 5; 325 MG/1; MG/1
1 TABLET ORAL ONCE
Status: COMPLETED | OUTPATIENT
Start: 2024-10-30 | End: 2024-10-30

## 2024-10-30 RX ORDER — ALBUTEROL SULFATE 90 UG/1
2 INHALANT RESPIRATORY (INHALATION) EVERY 4 HOURS PRN
Status: DISCONTINUED | OUTPATIENT
Start: 2024-10-30 | End: 2024-10-30

## 2024-10-30 RX ORDER — CYCLOBENZAPRINE HCL 10 MG
10 TABLET ORAL NIGHTLY
Status: DISCONTINUED | OUTPATIENT
Start: 2024-10-30 | End: 2024-11-03 | Stop reason: HOSPADM

## 2024-10-30 RX ORDER — DOCUSATE SODIUM 100 MG/1
1 CAPSULE, LIQUID FILLED ORAL
COMMUNITY
Start: 2024-10-26

## 2024-10-30 RX ORDER — HYDROMORPHONE HYDROCHLORIDE 0.2 MG/ML
0.2 INJECTION INTRAMUSCULAR; INTRAVENOUS; SUBCUTANEOUS EVERY 4 HOURS PRN
Status: DISCONTINUED | OUTPATIENT
Start: 2024-10-30 | End: 2024-11-03 | Stop reason: HOSPADM

## 2024-10-30 RX ORDER — MAGNESIUM SULFATE HEPTAHYDRATE 40 MG/ML
2 INJECTION, SOLUTION INTRAVENOUS ONCE
Status: COMPLETED | OUTPATIENT
Start: 2024-10-30 | End: 2024-10-30

## 2024-10-30 RX ORDER — HYDROCHLOROTHIAZIDE 25 MG/1
50 TABLET ORAL DAILY
Status: DISCONTINUED | OUTPATIENT
Start: 2024-10-30 | End: 2024-11-03 | Stop reason: HOSPADM

## 2024-10-30 RX ORDER — ATORVASTATIN CALCIUM 40 MG/1
40 TABLET, FILM COATED ORAL DAILY
Status: DISCONTINUED | OUTPATIENT
Start: 2024-10-30 | End: 2024-11-03 | Stop reason: HOSPADM

## 2024-10-30 RX ORDER — FOLIC ACID 1 MG/1
1 TABLET ORAL DAILY
Status: DISCONTINUED | OUTPATIENT
Start: 2024-10-30 | End: 2024-11-03 | Stop reason: HOSPADM

## 2024-10-30 RX ORDER — LOSARTAN POTASSIUM 50 MG/1
100 TABLET ORAL DAILY
Status: DISCONTINUED | OUTPATIENT
Start: 2024-10-31 | End: 2024-11-03 | Stop reason: HOSPADM

## 2024-10-30 RX ORDER — CYCLOBENZAPRINE HCL 5 MG
7.5 TABLET ORAL NIGHTLY
Status: DISCONTINUED | OUTPATIENT
Start: 2024-10-30 | End: 2024-10-30 | Stop reason: SDUPTHER

## 2024-10-30 RX ORDER — LABETALOL HYDROCHLORIDE 5 MG/ML
20 INJECTION, SOLUTION INTRAVENOUS ONCE
Status: COMPLETED | OUTPATIENT
Start: 2024-10-30 | End: 2024-10-30

## 2024-10-30 RX ORDER — ACETAMINOPHEN 325 MG/1
500 TABLET ORAL EVERY 6 HOURS PRN
Status: DISCONTINUED | OUTPATIENT
Start: 2024-10-30 | End: 2024-11-03 | Stop reason: HOSPADM

## 2024-10-30 RX ORDER — ONDANSETRON 4 MG/1
4 TABLET, FILM COATED ORAL EVERY 12 HOURS PRN
COMMUNITY
Start: 2024-10-26

## 2024-10-30 RX ORDER — ASPIRIN 81 MG/1
81 TABLET ORAL 2 TIMES DAILY
Status: DISCONTINUED | OUTPATIENT
Start: 2024-10-30 | End: 2024-11-03 | Stop reason: HOSPADM

## 2024-10-30 RX ORDER — LABETALOL HYDROCHLORIDE 5 MG/ML
10 INJECTION, SOLUTION INTRAVENOUS EVERY 4 HOURS PRN
Status: DISCONTINUED | OUTPATIENT
Start: 2024-10-30 | End: 2024-10-30

## 2024-10-30 RX ORDER — ACETAMINOPHEN 500 MG
1 TABLET ORAL EVERY 6 HOURS PRN
COMMUNITY
Start: 2024-10-26

## 2024-10-30 RX ORDER — OXYCODONE HYDROCHLORIDE 5 MG/1
5-10 TABLET ORAL EVERY 6 HOURS PRN
COMMUNITY
Start: 2024-10-26 | End: 2024-11-03 | Stop reason: HOSPADM

## 2024-10-30 RX ORDER — CEFTRIAXONE 1 G/50ML
1 INJECTION, SOLUTION INTRAVENOUS EVERY 24 HOURS
Status: DISCONTINUED | OUTPATIENT
Start: 2024-10-30 | End: 2024-10-31

## 2024-10-30 RX ORDER — POLYETHYLENE GLYCOL 3350 17 G/17G
17 POWDER, FOR SOLUTION ORAL DAILY PRN
Status: DISCONTINUED | OUTPATIENT
Start: 2024-10-30 | End: 2024-11-03 | Stop reason: HOSPADM

## 2024-10-30 RX ORDER — ALBUTEROL SULFATE 90 UG/1
2 INHALANT RESPIRATORY (INHALATION) EVERY 2 HOUR PRN
Status: DISCONTINUED | OUTPATIENT
Start: 2024-10-30 | End: 2024-11-03 | Stop reason: HOSPADM

## 2024-10-30 RX ORDER — LABETALOL HYDROCHLORIDE 5 MG/ML
20 INJECTION, SOLUTION INTRAVENOUS EVERY 4 HOURS PRN
Status: DISCONTINUED | OUTPATIENT
Start: 2024-10-30 | End: 2024-11-03 | Stop reason: HOSPADM

## 2024-10-30 RX ORDER — NITROGLYCERIN 0.4 MG/1
0.4 TABLET SUBLINGUAL EVERY 5 MIN PRN
Status: DISCONTINUED | OUTPATIENT
Start: 2024-10-30 | End: 2024-11-03 | Stop reason: HOSPADM

## 2024-10-30 RX ORDER — ENOXAPARIN SODIUM 100 MG/ML
40 INJECTION SUBCUTANEOUS EVERY 24 HOURS
Status: DISCONTINUED | OUTPATIENT
Start: 2024-10-30 | End: 2024-11-03 | Stop reason: HOSPADM

## 2024-10-30 RX ORDER — DOCUSATE SODIUM 100 MG/1
100 CAPSULE, LIQUID FILLED ORAL
Status: DISCONTINUED | OUTPATIENT
Start: 2024-10-30 | End: 2024-11-03 | Stop reason: HOSPADM

## 2024-10-30 RX ORDER — OXYCODONE AND ACETAMINOPHEN 5; 325 MG/1; MG/1
1 TABLET ORAL EVERY 6 HOURS PRN
Status: DISCONTINUED | OUTPATIENT
Start: 2024-10-30 | End: 2024-11-03 | Stop reason: HOSPADM

## 2024-10-30 SDOH — HEALTH STABILITY: PHYSICAL HEALTH
ON AVERAGE, HOW MANY DAYS PER WEEK DO YOU ENGAGE IN MODERATE TO STRENUOUS EXERCISE (LIKE A BRISK WALK)?: PATIENT DECLINED

## 2024-10-30 SDOH — SOCIAL STABILITY: SOCIAL INSECURITY
WITHIN THE LAST YEAR, HAVE YOU BEEN KICKED, HIT, SLAPPED, OR OTHERWISE PHYSICALLY HURT BY YOUR PARTNER OR EX-PARTNER?: NO

## 2024-10-30 SDOH — ECONOMIC STABILITY: INCOME INSECURITY: IN THE PAST 12 MONTHS HAS THE ELECTRIC, GAS, OIL, OR WATER COMPANY THREATENED TO SHUT OFF SERVICES IN YOUR HOME?: NO

## 2024-10-30 SDOH — SOCIAL STABILITY: SOCIAL INSECURITY: HAVE YOU HAD THOUGHTS OF HARMING ANYONE ELSE?: NO

## 2024-10-30 SDOH — SOCIAL STABILITY: SOCIAL INSECURITY: WERE YOU ABLE TO COMPLETE ALL THE BEHAVIORAL HEALTH SCREENINGS?: YES

## 2024-10-30 SDOH — SOCIAL STABILITY: SOCIAL INSECURITY: ARE YOU OR HAVE YOU BEEN THREATENED OR ABUSED PHYSICALLY, EMOTIONALLY, OR SEXUALLY BY ANYONE?: NO

## 2024-10-30 SDOH — SOCIAL STABILITY: SOCIAL INSECURITY: WITHIN THE LAST YEAR, HAVE YOU BEEN AFRAID OF YOUR PARTNER OR EX-PARTNER?: NO

## 2024-10-30 SDOH — SOCIAL STABILITY: SOCIAL INSECURITY
WITHIN THE LAST YEAR, HAVE YOU BEEN RAPED OR FORCED TO HAVE ANY KIND OF SEXUAL ACTIVITY BY YOUR PARTNER OR EX-PARTNER?: NO

## 2024-10-30 SDOH — ECONOMIC STABILITY: FOOD INSECURITY: WITHIN THE PAST 12 MONTHS, THE FOOD YOU BOUGHT JUST DIDN'T LAST AND YOU DIDN'T HAVE MONEY TO GET MORE.: NEVER TRUE

## 2024-10-30 SDOH — ECONOMIC STABILITY: FOOD INSECURITY: HOW HARD IS IT FOR YOU TO PAY FOR THE VERY BASICS LIKE FOOD, HOUSING, MEDICAL CARE, AND HEATING?: NOT VERY HARD

## 2024-10-30 SDOH — ECONOMIC STABILITY: HOUSING INSECURITY: AT ANY TIME IN THE PAST 12 MONTHS, WERE YOU HOMELESS OR LIVING IN A SHELTER (INCLUDING NOW)?: NO

## 2024-10-30 SDOH — ECONOMIC STABILITY: HOUSING INSECURITY: IN THE LAST 12 MONTHS, WAS THERE A TIME WHEN YOU WERE NOT ABLE TO PAY THE MORTGAGE OR RENT ON TIME?: NO

## 2024-10-30 SDOH — SOCIAL STABILITY: SOCIAL INSECURITY: WITHIN THE LAST YEAR, HAVE YOU BEEN HUMILIATED OR EMOTIONALLY ABUSED IN OTHER WAYS BY YOUR PARTNER OR EX-PARTNER?: NO

## 2024-10-30 SDOH — SOCIAL STABILITY: SOCIAL INSECURITY: HAVE YOU HAD ANY THOUGHTS OF HARMING ANYONE ELSE?: NO

## 2024-10-30 SDOH — SOCIAL STABILITY: SOCIAL INSECURITY: DO YOU FEEL ANYONE HAS EXPLOITED OR TAKEN ADVANTAGE OF YOU FINANCIALLY OR OF YOUR PERSONAL PROPERTY?: NO

## 2024-10-30 SDOH — ECONOMIC STABILITY: FOOD INSECURITY: WITHIN THE PAST 12 MONTHS, YOU WORRIED THAT YOUR FOOD WOULD RUN OUT BEFORE YOU GOT THE MONEY TO BUY MORE.: NEVER TRUE

## 2024-10-30 SDOH — SOCIAL STABILITY: SOCIAL INSECURITY: DOES ANYONE TRY TO KEEP YOU FROM HAVING/CONTACTING OTHER FRIENDS OR DOING THINGS OUTSIDE YOUR HOME?: NO

## 2024-10-30 SDOH — SOCIAL STABILITY: SOCIAL INSECURITY: ARE THERE ANY APPARENT SIGNS OF INJURIES/BEHAVIORS THAT COULD BE RELATED TO ABUSE/NEGLECT?: NO

## 2024-10-30 SDOH — SOCIAL STABILITY: SOCIAL INSECURITY: ABUSE: ADULT

## 2024-10-30 SDOH — HEALTH STABILITY: PHYSICAL HEALTH: ON AVERAGE, HOW MANY MINUTES DO YOU ENGAGE IN EXERCISE AT THIS LEVEL?: PATIENT DECLINED

## 2024-10-30 SDOH — SOCIAL STABILITY: SOCIAL INSECURITY: DO YOU FEEL UNSAFE GOING BACK TO THE PLACE WHERE YOU ARE LIVING?: NO

## 2024-10-30 SDOH — SOCIAL STABILITY: SOCIAL INSECURITY: HAS ANYONE EVER THREATENED TO HURT YOUR FAMILY OR YOUR PETS?: NO

## 2024-10-30 SDOH — ECONOMIC STABILITY: TRANSPORTATION INSECURITY: IN THE PAST 12 MONTHS, HAS LACK OF TRANSPORTATION KEPT YOU FROM MEDICAL APPOINTMENTS OR FROM GETTING MEDICATIONS?: NO

## 2024-10-30 SDOH — ECONOMIC STABILITY: HOUSING INSECURITY: IN THE PAST 12 MONTHS, HOW MANY TIMES HAVE YOU MOVED WHERE YOU WERE LIVING?: 1

## 2024-10-30 ASSESSMENT — ACTIVITIES OF DAILY LIVING (ADL)
WALKS IN HOME: NEEDS ASSISTANCE
PATIENT'S MEMORY ADEQUATE TO SAFELY COMPLETE DAILY ACTIVITIES?: YES
JUDGMENT_ADEQUATE_SAFELY_COMPLETE_DAILY_ACTIVITIES: YES
LACK_OF_TRANSPORTATION: NO
FEEDING YOURSELF: INDEPENDENT
BATHING: NEEDS ASSISTANCE
DRESSING YOURSELF: NEEDS ASSISTANCE
LACK_OF_TRANSPORTATION: NO
ADEQUATE_TO_COMPLETE_ADL: YES
HEARING - RIGHT EAR: FUNCTIONAL
GROOMING: INDEPENDENT
TOILETING: NEEDS ASSISTANCE
HEARING - LEFT EAR: FUNCTIONAL

## 2024-10-30 ASSESSMENT — PAIN SCALES - GENERAL
PAINLEVEL_OUTOF10: 0 - NO PAIN
PAINLEVEL_OUTOF10: 8
PAINLEVEL_OUTOF10: 9
PAINLEVEL_OUTOF10: 0 - NO PAIN

## 2024-10-30 ASSESSMENT — LIFESTYLE VARIABLES
SUBSTANCE_ABUSE_PAST_12_MONTHS: NO
HOW OFTEN DO YOU HAVE 6 OR MORE DRINKS ON ONE OCCASION: NEVER
HOW MANY STANDARD DRINKS CONTAINING ALCOHOL DO YOU HAVE ON A TYPICAL DAY: PATIENT DOES NOT DRINK
AUDIT-C TOTAL SCORE: 0
PRESCIPTION_ABUSE_PAST_12_MONTHS: NO
AUDIT-C TOTAL SCORE: 0
HOW OFTEN DO YOU HAVE A DRINK CONTAINING ALCOHOL: NEVER
SKIP TO QUESTIONS 9-10: 1

## 2024-10-30 ASSESSMENT — PATIENT HEALTH QUESTIONNAIRE - PHQ9
1. LITTLE INTEREST OR PLEASURE IN DOING THINGS: NOT AT ALL
SUM OF ALL RESPONSES TO PHQ9 QUESTIONS 1 & 2: 0
2. FEELING DOWN, DEPRESSED OR HOPELESS: NOT AT ALL

## 2024-10-30 ASSESSMENT — ENCOUNTER SYMPTOMS
ENDOCRINE NEGATIVE: 1
ABDOMINAL PAIN: 1
EYES NEGATIVE: 1
FREQUENCY: 1
HEMATOLOGIC/LYMPHATIC NEGATIVE: 1
PSYCHIATRIC NEGATIVE: 1
FATIGUE: 1
ACTIVITY CHANGE: 1
ARTHRALGIAS: 1
SHORTNESS OF BREATH: 1
ALLERGIC/IMMUNOLOGIC NEGATIVE: 1
HEADACHES: 1
PALPITATIONS: 1

## 2024-10-30 ASSESSMENT — COGNITIVE AND FUNCTIONAL STATUS - GENERAL
MOVING TO AND FROM BED TO CHAIR: A LITTLE
TOILETING: A LITTLE
PATIENT BASELINE BEDBOUND: NO
WALKING IN HOSPITAL ROOM: A LITTLE
CLIMB 3 TO 5 STEPS WITH RAILING: A LOT
MOVING FROM LYING ON BACK TO SITTING ON SIDE OF FLAT BED WITH BEDRAILS: A LITTLE
TURNING FROM BACK TO SIDE WHILE IN FLAT BAD: A LITTLE
DAILY ACTIVITIY SCORE: 21
DRESSING REGULAR LOWER BODY CLOTHING: A LITTLE
HELP NEEDED FOR BATHING: A LITTLE
STANDING UP FROM CHAIR USING ARMS: A LITTLE
MOBILITY SCORE: 17

## 2024-10-30 ASSESSMENT — PAIN DESCRIPTION - LOCATION: LOCATION: HIP

## 2024-10-30 ASSESSMENT — COLUMBIA-SUICIDE SEVERITY RATING SCALE - C-SSRS
6. HAVE YOU EVER DONE ANYTHING, STARTED TO DO ANYTHING, OR PREPARED TO DO ANYTHING TO END YOUR LIFE?: NO
2. HAVE YOU ACTUALLY HAD ANY THOUGHTS OF KILLING YOURSELF?: NO
6. HAVE YOU EVER DONE ANYTHING, STARTED TO DO ANYTHING, OR PREPARED TO DO ANYTHING TO END YOUR LIFE?: NO
2. HAVE YOU ACTUALLY HAD ANY THOUGHTS OF KILLING YOURSELF?: NO
1. IN THE PAST MONTH, HAVE YOU WISHED YOU WERE DEAD OR WISHED YOU COULD GO TO SLEEP AND NOT WAKE UP?: NO
1. IN THE PAST MONTH, HAVE YOU WISHED YOU WERE DEAD OR WISHED YOU COULD GO TO SLEEP AND NOT WAKE UP?: NO

## 2024-10-30 ASSESSMENT — HEART SCORE
ECG: NORMAL
HEART SCORE: 6
AGE: 65+
RISK FACTORS: >2 RISK FACTORS OR HX OF ATHEROSCLEROTIC DISEASE
HISTORY: MODERATELY SUSPICIOUS
TROPONIN: 1-3 TIMES NORMAL LIMIT

## 2024-10-30 ASSESSMENT — PAIN DESCRIPTION - ORIENTATION: ORIENTATION: LEFT

## 2024-10-30 NOTE — H&P
Chief Complaint   Patient presents with    CHEST FLUTTERING       [facility]    Anita Calabrese is a 74 y.o. female with a PMHx of HTN, HLD, CAD and GERD who presented to [facility] on  with a chief complaint of chest fluttering. 6 days ago left [facility] at [facility]. Physical therapist checked vital signs today, blood pressure in 190s/100s.  She stated she was not feeling well since yesterday.  Blood pressure was extremely high today.  When asked about what she means by not feeling well, she stated that she was feeling weak and fatigued.  She was having shortness of breath but this has been chronic.  She stated having increased frequency of urination with suprapubic discomfort and dark urine, but she was also feeling that she has not drinking enough water.  Of note she had 1 episode of chest pain on Saturday, when she became sweaty and diaphoretic, the pain lasted for few seconds across her chest and disappeared with nitroglycerin.  Patient was also having fluttering but she stated that it was not much and also happens when she became worried about her heart.  She was having headache on and off pressure in the last 2 days. She denies any change in vision, difficulty swallowing, change in hearing, fever/chills, chest pain,  cough, abdominal pain, weight change, change in bowel habits,  joint pain/swelling or mild left hip s/p surgery, weakness in any of the extremities or swelling, insomnia or any symptoms of depression.    ROS: 10 point review of systems negative with the exception of above.    ED Course:    ED Triage Vitals [10/30/24 1216]   Temp Heart Rate Resp BP   36.5 °C (97.7 °F) 98 20 (!) 188/96      SpO2 Temp src Heart Rate Source Patient Position   97 % -- Monitor Lying      BP Location FiO2 (%)     Right arm --         Labs:  Abnormal Labs Reviewed   CBC WITH AUTO DIFFERENTIAL - Abnormal; Notable for the following components:       Result Value    RBC 3.01 (*)     Hemoglobin 9.3 (*)     Hematocrit 29.0 (*)     RDW  14.9 (*)     Immature Granulocytes %, Automated 1.1 (*)     All other components within normal limits   MAGNESIUM - Abnormal; Notable for the following components:    Magnesium 1.35 (*)     All other components within normal limits   COMPREHENSIVE METABOLIC PANEL - Abnormal; Notable for the following components:    Glucose 108 (*)     Albumin 3.2 (*)     All other components within normal limits   SERIAL TROPONIN-INITIAL - Abnormal; Notable for the following components:    Troponin I, High Sensitivity 28 (*)     All other components within normal limits    Narrative:     Less than 99th percentile of normal range cutoff-  Female and children under 18 years old <14 ng/L; Male <21 ng/L: Negative  Repeat testing should be performed if clinically indicated.     Female and children under 18 years old 14-50 ng/L; Male 21-50 ng/L:  Consistent with possible cardiac damage and possible increased clinical   risk. Serial measurements may help to assess extent of myocardial damage.     >50 ng/L: Consistent with cardiac damage, increased clinical risk and  myocardial infarction. Serial measurements may help assess extent of   myocardial damage.      NOTE: Children less than 1 year old may have higher baseline troponin   levels and results should be interpreted in conjunction with the overall   clinical context.     NOTE: Troponin I testing is performed using a different   testing methodology at [facility] than at other   [facility]. Direct result comparisons should only   be made within the same method.   SERIAL TROPONIN, 1 HOUR - Abnormal; Notable for the following components:    Troponin I, High Sensitivity 27 (*)     All other components within normal limits    Narrative:     Less than 99th percentile of normal range cutoff-  Female and children under 18 years old <14 ng/L; Male <21 ng/L: Negative  Repeat testing should be performed if clinically indicated.     Female and children under 18 years old 14-50  ng/L; Male 21-50 ng/L:  Consistent with possible cardiac damage and possible increased clinical   risk. Serial measurements may help to assess extent of myocardial damage.     >50 ng/L: Consistent with cardiac damage, increased clinical risk and  myocardial infarction. Serial measurements may help assess extent of   myocardial damage.      NOTE: Children less than 1 year old may have higher baseline troponin   levels and results should be interpreted in conjunction with the overall   clinical context.     NOTE: Troponin I testing is performed using a different   testing methodology at [facility] than at other   [facility] hospitals. Direct result comparisons should only   be made within the same method.        No orders to display       CT head wo IV contrast   Final Result   No acute intracranial pathologic findings are identified.   There is no interval change when compared to the previous examination.        MACRO:   none        Signed by: Marty Luciano 10/30/2024 2:22 PM   Dictation workstation:   [id number]      XR chest 1 view   Final Result   1.  No evidence of acute cardiopulmonary process.             Signed by: Aguila Causey 10/30/2024 12:46 PM   Dictation workstation:   [id number]        CT head wo IV contrast   Final Result   No acute intracranial pathologic findings are identified.   There is no interval change when compared to the previous examination.        MACRO:   none        Signed by: Marty Luciano 10/30/2024 2:22 PM   Dictation workstation:   [id number]      XR chest 1 view   Final Result   1.  No evidence of acute cardiopulmonary process.             Signed by: Aguila Causey 10/30/2024 12:46 PM   Dictation workstation:   [id number]              Intervention: In ED, patient received   Medications   enoxaparin (Lovenox) syringe 40 mg (40 mg subcutaneous Given 10/30/24 2020)   polyethylene glycol (Glycolax, Miralax) packet 17 g (has no administration in time range)    acetaminophen (Tylenol) tablet 487.5 mg (has no administration in time range)   aspirin EC tablet 81 mg (81 mg oral Given 10/30/24 2020)   atorvastatin (Lipitor) tablet 40 mg (40 mg oral Given 10/30/24 1818)   docusate sodium (Colace) capsule 100 mg (100 mg oral Given 10/30/24 1818)   famotidine (Pepcid) tablet 20 mg (20 mg oral Given 10/30/24 2020)   folic acid (Folvite) tablet 1 mg (1 mg oral Given 10/30/24 1818)   hydroCHLOROthiazide (HYDRODiuril) tablet 50 mg (50 mg oral Given 10/30/24 1818)   losartan (Cozaar) tablet 100 mg (has no administration in time range)   nitroglycerin (Nitrostat) SL tablet 0.4 mg (has no administration in time range)   oxyCODONE-acetaminophen (Percocet) 5-325 mg per tablet 1 tablet (has no administration in time range)   HYDROmorphone PF (Dilaudid) injection 0.2 mg (has no administration in time range)   albuterol 90 mcg/actuation inhaler 2 puff (has no administration in time range)   cefTRIAXone (Rocephin) 1 g in dextrose (iso) IV 50 mL (0 g intravenous Stopped 10/30/24 2100)   cyclobenzaprine (Flexeril) tablet 10 mg (10 mg oral Given 10/30/24 2020)   labetaloL (Normodyne,Trandate) injection 20 mg (has no administration in time range)   amLODIPine (Norvasc) tablet 5 mg (has no administration in time range)   magnesium sulfate 2 g in sterile water for injection 50 mL (0 g intravenous Stopped 10/30/24 1556)   oxyCODONE-acetaminophen (Percocet) 5-325 mg per tablet 1 tablet (1 tablet oral Given 10/30/24 1524)   labetaloL (Normodyne,Trandate) injection 20 mg (20 mg intravenous Given 10/30/24 1552)      Patient was then transferred to the floor for further management      Meds:   Current Discharge Medication List          Follows up with Dr. Jose Angel Ragland, DO      Past Medical History     Past Medical History:   Diagnosis Date    Atypical chest pain 05/19/2023    Primary osteoarthritis of first carpometacarpal joint of left hand 02/15/2018    RSD (reflex sympathetic dystrophy)  "2023    Wheezing 2021      Surgical History     Past Surgical History:   Procedure Laterality Date    OTHER SURGICAL HISTORY  2019    Shoulder surgery    OTHER SURGICAL HISTORY  2019    Knee replacement    OTHER SURGICAL HISTORY  2019     section    OTHER SURGICAL HISTORY  2019    Hysterectomy     Family History     Family History   Problem Relation Name Age of Onset    Cancer Mother      Heart disease Father       Social History     Tobacco Use: Medium Risk (10/30/2024)    Patient History     Smoking Tobacco Use: Former     Smokeless Tobacco Use: Never     Passive Exposure: Past      Social History     Substance and Sexual Activity   Alcohol Use Not Currently    Comment: RARELY      Allergies     Allergies   Allergen Reactions    Azithromycin Hallucinations    Aspirin Swelling    Diclofenac Swelling    Salicylates Swelling    Corticosteroids (Glucocorticoids) Swelling    Penicillins Unknown    Prednisone Swelling      Meds    Scheduled medications  [START ON 10/31/2024] amLODIPine, 5 mg, oral, Daily  aspirin, 81 mg, oral, BID  atorvastatin, 40 mg, oral, Daily  cefTRIAXone, 1 g, intravenous, q24h  cyclobenzaprine, 10 mg, oral, Nightly  docusate sodium, 100 mg, oral, q12h ANDREW  enoxaparin, 40 mg, subcutaneous, q24h  famotidine, 20 mg, oral, BID  folic acid, 1 mg, oral, Daily  hydroCHLOROthiazide, 50 mg, oral, Daily  [START ON 10/31/2024] losartan, 100 mg, oral, Daily      Continuous medications     PRN medications  PRN medications: acetaminophen, albuterol, HYDROmorphone, labetaloL, nitroglycerin, oxyCODONE-acetaminophen, polyethylene glycol   Objective     Vitals  Visit Vitals  /71 (BP Location: Right arm, Patient Position: Lying)   Pulse 84   Temp 35.7 °C (96.3 °F)   Resp 18   Ht 1.676 m (5' 6\")   Wt 97.5 kg (215 lb)   SpO2 94%   BMI 34.70 kg/m²   OB Status Postmenopausal   Smoking Status Former   BSA 2.13 m²        Review of Systems   Constitutional:  Positive for " activity change and fatigue.   HENT: Negative.     Eyes: Negative.    Respiratory:  Positive for shortness of breath.    Cardiovascular:  Positive for palpitations. Negative for chest pain.   Gastrointestinal:  Positive for abdominal pain (Supra pubic).   Endocrine: Negative.    Genitourinary:  Positive for frequency.   Musculoskeletal:  Positive for arthralgias (Left hip).   Skin: Negative.    Allergic/Immunologic: Negative.    Neurological:  Positive for headaches.   Hematological: Negative.    Psychiatric/Behavioral: Negative.       Temp:  [35.7 °C (96.3 °F)-36.5 °C (97.7 °F)] 35.7 °C (96.3 °F)  Heart Rate:  [83-98] 84  Resp:  [12-23] 18  BP: (139-209)/(67-97) 146/71  I/O last 3 completed shifts:  In: 50 (0.5 mL/kg) [I.V.:50 (0.5 mL/kg)]  Out: - (0 mL/kg)   Weight: 97.5 kg   No intake/output data recorded.  Physical Exam  Constitutional:       General: She is not in acute distress.     Appearance: Normal appearance.   HENT:      Head: Normocephalic and atraumatic.      Nose: Nose normal.      Mouth/Throat:      Mouth: Mucous membranes are moist.      Pharynx: Oropharynx is clear.   Eyes:      Extraocular Movements: Extraocular movements intact.      Conjunctiva/sclera: Conjunctivae normal.      Pupils: Pupils are equal, round, and reactive to light.   Cardiovascular:      Rate and Rhythm: Normal rate and regular rhythm.      Pulses: Normal pulses.      Heart sounds: Normal heart sounds.   Pulmonary:      Effort: Pulmonary effort is normal.      Breath sounds: Normal breath sounds.   Abdominal:      General: Abdomen is flat. Bowel sounds are normal.      Palpations: Abdomen is soft.   Musculoskeletal:         General: Normal range of motion.      Cervical back: Normal range of motion and neck supple.      Comments: Left hip wrapped   Skin:     General: Skin is warm and dry.   Neurological:      General: No focal deficit present.      Mental Status: She is alert and oriented to person, place, and time. Mental  "status is at baseline.   Psychiatric:         Mood and Affect: Mood normal.         Behavior: Behavior normal.         Thought Content: Thought content normal.       Principal Problem:    Hypomagnesemia          I/Os    Intake/Output Summary (Last 24 hours) at 10/30/2024 2316  Last data filed at 10/30/2024 1556  Gross per 24 hour   Intake 50 ml   Output --   Net 50 ml         Labs:   Results from last 72 hours   Lab Units 10/30/24  1221   SODIUM mmol/L 139   POTASSIUM mmol/L 3.7   CHLORIDE mmol/L 101   CO2 mmol/L 30   BUN mg/dL 13   CREATININE mg/dL 0.90   GLUCOSE mg/dL 108*   CALCIUM mg/dL 9.1   ANION GAP mmol/L 12   EGFR mL/min/1.73m*2 66      Results from last 72 hours   Lab Units 10/30/24  1221   WBC AUTO x10*3/uL 7.9   HEMOGLOBIN g/dL 9.3*   HEMATOCRIT % 29.0*   PLATELETS AUTO x10*3/uL 266   NEUTROS PCT AUTO % 63.7   LYMPHS PCT AUTO % 21.6   MONOS PCT AUTO % 8.8   EOS PCT AUTO % 4.2      Lab Results   Component Value Date    CALCIUM 9.1 10/30/2024      No results found for: \"CRP\"   [unfilled]     Micro/ID:   No results found for the last 90 days.                   No lab exists for component: \"AGALPCRNB\"   .ID  Lab Results   Component Value Date    URINECULTURE No significant growth 10/23/2023     Images    CT head wo IV contrast  Narrative: Interpreted By:  Marty Luciano,   STUDY:  CT HEAD WO IV CONTRAST; 10/30/2024 2:00 pm      INDICATION:  Signs/Symptoms:Headache, elevated blood pressure.      COMPARISON:  10/23/2023      ACCESSION NUMBER(S):  [id number]      ORDERING CLINICIAN:  JOHN BAEZ      TECHNIQUE:  A helical acquisition data was obtained.      One or more of the following dose reduction techniques were used:  Automated exposure control Adjustment of the mA and/or kV according  to patient size, and/or use of iterative reconstruction technique.      FINDINGS:  Intracranial findings: There is no evidence for intracranial  hemorrhage or mass effect. No calvarial fractures are identified.  Mild " age-related atrophy is present.      Paranasal sinuses and temporal bone findings:  The visualized  portions of the paranasal sinuses, mastoid air cells, and middle ear  cavities are clear.      Orbital findings: The visualized portions of the orbits are  unremarkable.      Impression: No acute intracranial pathologic findings are identified.  There is no interval change when compared to the previous examination.      MACRO:  none      Signed by: Marty Luciano 10/30/2024 2:22 PM  Dictation workstation:   [id number]  XR chest 1 view  Narrative: Interpreted By:  Aguila Causey,   STUDY:  XR CHEST 1 VIEW;  10/30/2024 12:29 pm      INDICATION:  Signs/Symptoms:Chest fluttering.      COMPARISON:          01/03/2024      ACCESSION NUMBER(S):  [id number]      ORDERING CLINICIAN:  JOHN BAEZ      FINDINGS:          CARDIOMEDIASTINAL SILHOUETTE:  Cardiomediastinal silhouette is stable in size and configuration.      LUNGS:  No consolidation, pneumothorax, or significant effusion.      ABDOMEN:  No remarkable upper abdominal findings.      BONES:  No acute osseous changes.      Impression: 1.  No evidence of acute cardiopulmonary process.          Signed by: Aguila Causey 10/30/2024 12:46 PM  Dictation workstation:   [id number]    Assessment and Plan    Anita Calabrese is a 74 y.o. female admitted for fatigue, high blood pressure and possible fluttering.    Acute Medical Issues   # Hypertensive emergency/urgency:  -Patient's blood pressure was severely elevated in the ED, with maximum reaching systolic above 200 once  -Patient responded well to labetalol 20 mg IV.  -Resume home medications, hydrochlorothiazide 50 mg daily, losartan 100 mg daily, will add amlodipine 5 mg, continue to monitor, adjust as needed.        #Concern for fluttering/chest pain:  -Patient had 1 episode of chest pain, on Saturday that subsided spontaneously.  -Less likely cardiac in nature, EKG with no ischemic changes.  -Will continue to  monitor, and will consider cardiology consult if needed.      #Elevated troponin:  #Acute myocardial injury:  -Mildly elevated troponin, likely secondary to demand ischemia type II MI.  -EKG with no ischemic changes according to the ED note.  -Continue to monitor till downtrends.    #Concern for UTI:  -Patient has suprapubic pain, with increased frequency.  -UA with reflex microscopy and culture; pending.  -Started on ceftriaxone empirically, will discontinue if urinalysis is negative.    #Hypomagnesemia:  - Repleted in the ED  -Continue to monitor    Chronic Medical Issues   # CAD/HLD:  -Continue ASA 81 and atorvastatin 40 mg    # Possible COPD:  -Continue albuterol inhaler as needed      #S/p surgery for total hip replacement:  -Pain management and continue to monitor, Flexeril 10 mg nightly.  -PT/OT while in-house    F: PO intake & IVF PRN  E: Replete as needed  N: Cardiac  GI ppx: Famotidine 20 mg daily   DVT ppx: Lovenox subcutaneous  Antibiotics: Ceftriaxone  Oxygenation: RA    Code Status: Full Code   Emergency Contact: Extended Emergency Contact Information  Primary Emergency Contact: HEATH UNGERN  Mobile Phone: [phone]  Relation: Daughter  Preferred language: English   needed? No  Secondary Emergency Contact: RAFAEL UNGER  Mobile Phone: [phone]  Relation: Daughter  Preferred language: English   needed? No     Disposition: 74 y.o.female admitted for hypertensive emergency/urgency, anticipate LOS > 2 midnights.         Veronika Maravilla  Internal Medicine, Hospitalist   [facility]  [place]

## 2024-10-30 NOTE — PROGRESS NOTES
Pharmacy Medication History Review    Anita Calabrese is a 74 y.o. female admitted for No Principal Problem: There is no principal problem currently on the Problem List. Please update the Problem List and refresh.. Pharmacy reviewed the patient's [id number] medications and allergies for accuracy.    The list below reflectives the updated PTA list. Please review each medication in order reconciliation for additional clarification and justification.  Prior to Admission medications    Medication Sig Start Date End Date Taking? Authorizing Provider   acetaminophen (Tylenol) 500 mg tablet Take 1 tablet (500 mg) by mouth every 6 hours if needed for mild pain (1 - 3). 10/26/24  Yes Historical Provider, MD   albuterol 90 mcg/actuation inhaler Inhale 2 puffs every 4 hours if needed for shortness of breath or wheezing. 7/17/20  Yes Historical Provider, MD   aspirin 81 mg EC tablet Take 1 tablet (81 mg) by mouth 2 times a day. 10/26/24 12/11/24 Yes Historical Provider, MD   atorvastatin (Lipitor) 40 mg tablet Take 1 tablet (40 mg) by mouth once daily. 1/30/24 1/29/25 Yes Tracy M Schwab, APRN-CNP   cyclobenzaprine (Flexeril) 5 mg tablet Take 1-2 tablets (5-10 mg) by mouth once daily at bedtime. 8/13/24 11/11/24 Yes Ben Byrd MD   docusate sodium (Colace) 100 mg capsule Take 1 capsule (100 mg) by mouth every 12 hours. 10/26/24  Yes Historical Provider, MD   famotidine (Pepcid) 20 mg tablet TAKE ONE TABLET BY MOUTH TWO TIMES A DAY 5/9/24  Yes Jose Angel Ragland, DO   folic acid (Folvite) 1 mg tablet Take 1 tablet (1 mg) by mouth once daily.   Yes Historical Provider, MD   gabapentin (Neurontin) 600 mg tablet 1 tablet (600 mg). 6/12/19  Yes Historical Provider, MD   hydroCHLOROthiazide (HYDRODiuril) 25 mg tablet TAKE 2 TABLETS(50 MG) BY MOUTH EVERY DAY  Patient taking differently: Take 2 tablets (50 mg) by mouth once daily. 10/30/23  Yes Kamilah Osei, DO   hydroxychloroquine (Plaquenil) 200 mg tablet Take 1  tablet (200 mg) by mouth 2 times a day. 5/17/22  Yes Historical Provider, MD   lansoprazole (Prevacid) 30 mg DR capsule Take 1 capsule (30 mg) by mouth once daily. Do not crush or chew. 3/13/24 3/13/25 Yes Marly Rodriguez MD   losartan (Cozaar) 100 mg tablet Take 1 tablet (100 mg) by mouth once daily. 3/27/24  Yes Jose Angel Ragland,    ondansetron (Zofran) 4 mg tablet Take 1 tablet (4 mg) by mouth every 12 hours if needed for nausea. 10/26/24  Yes Historical Provider, MD   oxyCODONE (Roxicodone) 5 mg immediate release tablet Take 1-2 tablets (5-10 mg) by mouth every 6 hours if needed for severe pain (7 - 10) or moderate pain (4 - 6). 10/26/24 11/2/24 Yes Historical Provider, MD   benzonatate (Tessalon) 100 mg capsule Take 1 capsule (100 mg) by mouth 3 times a day as needed for cough.  Patient not taking: Reported on 10/30/2024 1/18/24   Historical Provider, MD   naloxone (Narcan) 4 mg/0.1 mL nasal spray Administer 1 spray (4 mg) into affected nostril(s) if needed for opioid reversal. May repeat every 2-3 minutes if needed, alternating nostrils, until medical assistance becomes available. 8/13/24   Ben Byrd MD   nitroglycerin (Nitrostat) 0.4 mg SL tablet Place 1 tablet (0.4 mg) under the tongue every 5 minutes if needed. 1/18/24   Historical Provider, MD        The list below reflectives the updated allergy list. Please review each documented allergy for additional clarification and justification.  Allergies  Reviewed by Allie Joseph RN on 10/30/2024        Severity Reactions Comments    Azithromycin High Hallucinations     Aspirin Medium Swelling     Diclofenac Medium Swelling     Salicylates Medium Swelling     Corticosteroids (glucocorticoids) Not Specified Swelling     Penicillins Not Specified Unknown     Prednisone Not Specified Swelling             Below are additional concerns with the patient's PTA list.    Patient discharged from [facility] 10/26/2024.    Jenny Hermosillo

## 2024-10-30 NOTE — ED TRIAGE NOTES
Patient BIBA from home for chest fluttering. Per EMS patient received a left hip arthroplasty 6 days prior to arrival and was receiving PT when when her blood pressure began rise and patient began having a sudden onset of a headache. She denies dizziness, SOB, and CP on arrival.

## 2024-10-30 NOTE — ED PROVIDER NOTES
Limitations to History: None     HPI:      Anita Calabrese is a 74 y.o. significant past medical history for HTN, HLD, CAD and GERD who is on daily baby aspirin presenting to ED today from home via EMS for evaluation of chest fluttering.  6 days ago left MAE at [facility].  Physical therapist checked vital signs today, blood pressure in 190s/100s.  Reporting intermittent central chest fluttering since yesterday and a mild headache.  Minimal postoperative pain.  Reports being short of breath at baseline, symptoms no worse.  States he had chest pain on Saturday prior to discharge but did not mention it as she wanted to be discharged home.  Patient endorses central chest pressure associated with the fluttering.  Son who currently cares for her is returning to [place] today, she is unsure if she will be able to care for self at home.  Denies fever/chills, cough/cold symptoms, shortness of breath, nausea/vomiting, abdominal pain, urinary symptoms, change in bowel habits, visual changes or any other complaints.  No smoking, EtOH or drug use.  PCP is .     Additional History Obtained from: Triage/nursing notes reviewed.    ------------------------------------------------------------------------------------------------------------------------------------------    VS: As documented in the triage note and EMR flowsheet from this visit were reviewed.    Physical Exam:  Gen: Elderly -American female, awake and alert, oriented x 3.  Well-nourished and hydrated.  Nontoxic looking.  Head/Neck: NCAT, neck w/ FROM  Eyes: EOMI, PERRL, anicteric sclerae, noninjected conjunctivae  Ears: TMs clear b/l without sign of infection  Nose: Nares patent w/o rhinorrhea  Mouth:  MMM, no OP lesions noted  Heart: RRR no MRG.  Chest pain not reproducible.  Lungs: CTA b/l no RRW, no increased work of breathing  Abdomen: soft, NT, ND, no HSM, no palpable masses  Musculoskeletal: MARY x 4.  MSPs intact.  Skin intact.  No  deformities.  Neurologic: Alert, symmetrical facies, phonates clearly, moves all extremities equally, responsive to touch, ambulates normally   Skin: Pink, warm and dry.  Postop dressing left anterior hip dry and intact.  No erythema, edema or ecchymosis.  No rashes noted  Psychological: calm, no SI/HI      ------------------------------------------------------------------------------------------------------------------------------------------    Medical Decision Makin y.o. significant past medical history for HTN, HLD, CAD, GERD and left MAE 6 days ago he is evaluated at the bedside for central chest pressure, chest fluttering and headache with elevated blood pressures.  On arrival to the ED, vital signs within normal limits.  Afebrile.  Lungs clear, abdomen soft and nontender.  Postop dressing dry and intact.    Differential includes is not limited to ACS, electrolyte derangement, arrhythmia and pneumonia.    IV Established, continuous cardiac/O2 sat monitoring.  Basic labs, EKG, chest x-ray will be obtained.      ED Course as of 10/30/24 1444   Wed Oct 30, 2024   1218 EKG obtained and interpreted by me.  Sinus rhythm.  Rate 98.  Normal axis and intervals.  No acute ischemia.  No STEMI. [MK]   1309 Laboratory studies were reviewed, no leukocytosis, hemoglobin 9.3 which is down from 12 a year ago.  Kidney function and LFTs.  BNP normal at 63.  Magnesium slightly low at 1.35 which was repleted with bolus.  Slight bump in troponin 28, delta pending.  Chest x-ray no acute cardiopulmonary process. [SB]   1443 In brief, this is a 74-year-old female, medical history significant for coronary vascular disease without intervention and hypertension who presents with chest pain.  Patient symptoms have been intermittently for the past 3 days.  She is recently status post hip replacement.  Physical exam: Heart regular in rhythm.  Abdomen soft and nontender.  ED course: EKG obtained interpreted by me.  Metabolic workup  pursued.  Patient found to be hypomagnesemic.  Troponin mildly elevated.  Given age and risk factor, I do feel that she would benefit from observation. [MK]   1443 Remains chest pain-free at this time.  Vital signs within normal limits. Patient will require observation admission to telemetry for chest pain rule out and hypomagnesemia.  Case discussed with , he agrees to admission.  Diagnosis, treatment and plan for admission discussed with patient, she verbalizes understanding and is in agreement.  Condition stable. [SB]      ED Course User Index  [MK] Dewey Tenorio MD  [SB] Milagros Hinkle, APRN-CNP         Diagnoses as of 10/30/24 1444   Hypomagnesemia   Chest pain, rule out acute myocardial infarction       EKG interpreted by Dr. Horvath    Chronic Medical Conditions Significantly Affecting Care: None    External Records Reviewed: I reviewed recent and relevant outside records including: None    Discussion of Management with Other Providers: Seen and evaluated with ED attending physician, Dr. Horvath, he agrees with the treatment plan and final disposition of the patient.    I discussed the patient/results with: Dr. Taiwo Hinkle, APRN-CNP  10/30/24 1447

## 2024-10-31 LAB
ALBUMIN SERPL BCP-MCNC: 3.2 G/DL (ref 3.4–5)
ANION GAP SERPL CALC-SCNC: 13 MMOL/L (ref 10–20)
ATRIAL RATE: 98 BPM
BUN SERPL-MCNC: 14 MG/DL (ref 6–23)
CALCIUM SERPL-MCNC: 9 MG/DL (ref 8.6–10.3)
CHLORIDE SERPL-SCNC: 100 MMOL/L (ref 98–107)
CO2 SERPL-SCNC: 29 MMOL/L (ref 21–32)
CREAT SERPL-MCNC: 1.09 MG/DL (ref 0.5–1.05)
EGFRCR SERPLBLD CKD-EPI 2021: 53 ML/MIN/1.73M*2
ERYTHROCYTE [DISTWIDTH] IN BLOOD BY AUTOMATED COUNT: 15 % (ref 11.5–14.5)
GLUCOSE SERPL-MCNC: 117 MG/DL (ref 74–99)
HCT VFR BLD AUTO: 29.4 % (ref 36–46)
HGB BLD-MCNC: 9.3 G/DL (ref 12–16)
MAGNESIUM SERPL-MCNC: 1.77 MG/DL (ref 1.6–2.4)
MCH RBC QN AUTO: 31 PG (ref 26–34)
MCHC RBC AUTO-ENTMCNC: 31.6 G/DL (ref 32–36)
MCV RBC AUTO: 98 FL (ref 80–100)
NRBC BLD-RTO: 0 /100 WBCS (ref 0–0)
P AXIS: 18 DEGREES
P OFFSET: 198 MS
P ONSET: 135 MS
PHOSPHATE SERPL-MCNC: 4.8 MG/DL (ref 2.5–4.9)
PLATELET # BLD AUTO: 273 X10*3/UL (ref 150–450)
POTASSIUM SERPL-SCNC: 3.7 MMOL/L (ref 3.5–5.3)
PR INTERVAL: 166 MS
Q ONSET: 218 MS
QRS COUNT: 16 BEATS
QRS DURATION: 82 MS
QT INTERVAL: 358 MS
QTC CALCULATION(BAZETT): 457 MS
QTC FREDERICIA: 421 MS
R AXIS: 2 DEGREES
RBC # BLD AUTO: 3 X10*6/UL (ref 4–5.2)
SODIUM SERPL-SCNC: 138 MMOL/L (ref 136–145)
T AXIS: 45 DEGREES
T OFFSET: 397 MS
TSH SERPL-ACNC: 2.69 MIU/L (ref 0.44–3.98)
VENTRICULAR RATE: 98 BPM
WBC # BLD AUTO: 7.3 X10*3/UL (ref 4.4–11.3)

## 2024-10-31 PROCEDURE — 36415 COLL VENOUS BLD VENIPUNCTURE: CPT | Performed by: INTERNAL MEDICINE

## 2024-10-31 PROCEDURE — 96372 THER/PROPH/DIAG INJ SC/IM: CPT | Performed by: INTERNAL MEDICINE

## 2024-10-31 PROCEDURE — G0378 HOSPITAL OBSERVATION PER HR: HCPCS

## 2024-10-31 PROCEDURE — 99232 SBSQ HOSP IP/OBS MODERATE 35: CPT | Performed by: INTERNAL MEDICINE

## 2024-10-31 PROCEDURE — 84443 ASSAY THYROID STIM HORMONE: CPT | Performed by: INTERNAL MEDICINE

## 2024-10-31 PROCEDURE — 83735 ASSAY OF MAGNESIUM: CPT | Performed by: INTERNAL MEDICINE

## 2024-10-31 PROCEDURE — 97165 OT EVAL LOW COMPLEX 30 MIN: CPT | Mod: GO

## 2024-10-31 PROCEDURE — 80069 RENAL FUNCTION PANEL: CPT | Performed by: INTERNAL MEDICINE

## 2024-10-31 PROCEDURE — 97161 PT EVAL LOW COMPLEX 20 MIN: CPT | Mod: GP

## 2024-10-31 PROCEDURE — 2500000004 HC RX 250 GENERAL PHARMACY W/ HCPCS (ALT 636 FOR OP/ED): Performed by: INTERNAL MEDICINE

## 2024-10-31 PROCEDURE — 85027 COMPLETE CBC AUTOMATED: CPT | Performed by: INTERNAL MEDICINE

## 2024-10-31 PROCEDURE — 2500000001 HC RX 250 WO HCPCS SELF ADMINISTERED DRUGS (ALT 637 FOR MEDICARE OP): Performed by: INTERNAL MEDICINE

## 2024-10-31 RX ORDER — CARVEDILOL 3.12 MG/1
6.25 TABLET ORAL
Status: DISCONTINUED | OUTPATIENT
Start: 2024-10-31 | End: 2024-11-01

## 2024-10-31 RX ORDER — MAGNESIUM SULFATE HEPTAHYDRATE 40 MG/ML
2 INJECTION, SOLUTION INTRAVENOUS ONCE
Status: COMPLETED | OUTPATIENT
Start: 2024-10-31 | End: 2024-10-31

## 2024-10-31 ASSESSMENT — COGNITIVE AND FUNCTIONAL STATUS - GENERAL
CLIMB 3 TO 5 STEPS WITH RAILING: A LOT
CLIMB 3 TO 5 STEPS WITH RAILING: A LOT
MOVING TO AND FROM BED TO CHAIR: A LITTLE
HELP NEEDED FOR BATHING: A LITTLE
STANDING UP FROM CHAIR USING ARMS: A LITTLE
DRESSING REGULAR LOWER BODY CLOTHING: A LOT
DRESSING REGULAR LOWER BODY CLOTHING: A LITTLE
DRESSING REGULAR LOWER BODY CLOTHING: A LITTLE
DAILY ACTIVITIY SCORE: 21
WALKING IN HOSPITAL ROOM: A LITTLE
STANDING UP FROM CHAIR USING ARMS: A LITTLE
MOBILITY SCORE: 19
MOBILITY SCORE: 20
MOBILITY SCORE: 19
DAILY ACTIVITIY SCORE: 23
DAILY ACTIVITIY SCORE: 19
TURNING FROM BACK TO SIDE WHILE IN FLAT BAD: A LITTLE
CLIMB 3 TO 5 STEPS WITH RAILING: A LITTLE
MOVING TO AND FROM BED TO CHAIR: A LITTLE
HELP NEEDED FOR BATHING: A LITTLE
STANDING UP FROM CHAIR USING ARMS: A LITTLE
TOILETING: A LITTLE
MOVING TO AND FROM BED TO CHAIR: A LITTLE
WALKING IN HOSPITAL ROOM: A LITTLE
TOILETING: A LOT

## 2024-10-31 ASSESSMENT — PAIN SCALES - GENERAL
PAINLEVEL_OUTOF10: 8
PAINLEVEL_OUTOF10: 10 - WORST POSSIBLE PAIN
PAINLEVEL_OUTOF10: 2
PAINLEVEL_OUTOF10: 7
PAINLEVEL_OUTOF10: 2
PAINLEVEL_OUTOF10: 7

## 2024-10-31 ASSESSMENT — PAIN DESCRIPTION - DESCRIPTORS: DESCRIPTORS: ACHING;SHARP;SHOOTING

## 2024-10-31 ASSESSMENT — PAIN - FUNCTIONAL ASSESSMENT
PAIN_FUNCTIONAL_ASSESSMENT: 0-10

## 2024-10-31 NOTE — PROGRESS NOTES
Anita Calabrese is a 74 y.o. female on day 0 of admission presenting with Hypomagnesemia.      Subjective   Patient seen and examined at bedside, she was having some pain from her surgery.  She did not have any chest pain or fluttering. Denies any f/c, n/v, new onset headaches, vision changes, chest pain, dyspnea, abdominal pain, changes in BM, or urinary changes.         Objective     Last Recorded Vitals  /72   Pulse 88   Temp 36 °C (96.8 °F)   Resp 16   Wt 97.5 kg (215 lb)   SpO2 96%   Intake/Output last 3 Shifts:    Intake/Output Summary (Last 24 hours) at 10/31/2024 1935  Last data filed at 10/31/2024 1700  Gross per 24 hour   Intake 870.47 ml   Output 1000 ml   Net -129.53 ml       Admission Weight  Weight: 97.5 kg (215 lb) (10/30/24 1216)    Daily Weight  10/30/24 : 97.5 kg (215 lb)    Image Results  ECG 12 lead  Normal sinus rhythm  Minimal voltage criteria for LVH, may be normal variant ( R in aVL )  Cannot rule out Inferior infarct , age undetermined  Abnormal ECG  When compared with ECG of 23-OCT-2023 12:45,  Premature supraventricular complexes are no longer Present      Physical Exam  Constitutional:       Appearance: Normal appearance. She is obese.   HENT:      Head: Normocephalic and atraumatic.      Right Ear: Tympanic membrane normal.      Left Ear: Tympanic membrane normal.      Nose: Nose normal.      Mouth/Throat:      Mouth: Mucous membranes are moist.      Pharynx: Oropharynx is clear.   Eyes:      General: No scleral icterus.        Right eye: No discharge.         Left eye: No discharge.      Extraocular Movements: Extraocular movements intact.      Conjunctiva/sclera: Conjunctivae normal.      Pupils: Pupils are equal, round, and reactive to light.   Neck:      Vascular: No carotid bruit.   Cardiovascular:      Rate and Rhythm: Normal rate and regular rhythm.      Pulses: Normal pulses.      Heart sounds: No murmur heard.     No friction rub. No gallop.   Pulmonary:       Effort: Pulmonary effort is normal.      Breath sounds: Normal breath sounds.   Abdominal:      General: Bowel sounds are normal. There is no distension.      Palpations: Abdomen is soft. There is no mass.      Tenderness: There is no abdominal tenderness. There is no right CVA tenderness, left CVA tenderness, guarding or rebound.      Hernia: No hernia is present.   Musculoskeletal:         General: No swelling, tenderness, deformity or signs of injury.      Cervical back: Normal range of motion and neck supple. No rigidity or tenderness.      Right lower leg: No edema.      Left lower leg: No edema.      Comments: Left hip wrapped    Lymphadenopathy:      Cervical: No cervical adenopathy.   Skin:     General: Skin is warm.      Coloration: Skin is not jaundiced or pale.      Findings: No bruising, erythema, lesion or rash.   Neurological:      General: No focal deficit present.      Mental Status: She is alert and oriented to person, place, and time. Mental status is at baseline.   Psychiatric:         Mood and Affect: Mood normal.         Behavior: Behavior normal.         Thought Content: Thought content normal.         Judgment: Judgment normal.         Relevant Results               Assessment/Plan      Assessment & Plan  Hypomagnesemia    Anita Calabrese is a 74 y.o. female admitted for fatigue, high blood pressure and possible fluttering.     Acute Medical Issues   # Hypertensive emergency/urgency:  -Patient's blood pressure was severely elevated in the ED, with maximum reaching systolic above 200 once  -Patient responded well to labetalol 20 mg IV.  -Resume home medications, hydrochlorothiazide 50 mg daily, losartan 100 mg daily, better controlled after  adding  amlodipine 5 mg, but not at goal ,continue to monitor, adjust as needed.  - Renal duplex ordered to rule out RA stenosis.            #Concern for fluttering/chest pain:  -Patient had 1 episode of chest pain, on Saturday that subsided  spontaneously.  -Less likely cardiac in nature, EKG with no ischemic changes.  -Will continue to monitor, and will consider cardiology consult if needed.        #Elevated troponin (Resolved):  -Mildly elevated troponin, likely secondary to demand ischemia type II MI.  -EKG with no ischemic changes according to the ED note.  - troponin down trended      #  UTI ruled out:  -Patient has suprapubic pain, with increased frequency.  -UA with reflex microscopy and culture; negative for infections.  -Discontinue ceftriaxone.      #Hypomagnesemia:  - Repleted in the ED  -Improved, but given more to increase level above 2.       Chronic Medical Issues   # CAD/HLD:  -Continue ASA 81 and atorvastatin 40 mg     # Possible COPD:  -Continue albuterol inhaler as needed        #S/p surgery for total hip replacement:  -Pain management and continue to monitor, Flexeril 10 mg nightly.  -PT/OT while in-house     F: PO intake & IVF PRN  E: Replete as needed  N: Cardiac  GI ppx: Famotidine 20 mg daily   DVT ppx: Lovenox subcutaneous  Antibiotics: Ceftriaxone  Oxygenation: RA     Code Status: Full Code   Emergency Contact: Extended Emergency Contact Information  Primary Emergency Contact: TAJ UNGER  Mobile Phone: [phone]  Relation: Daughter  Preferred language: English   needed? No  Secondary Emergency Contact: RAFAEL UNGER  Mobile Phone: [phone]  Relation: Daughter  Preferred language: English   needed? No      Disposition: 74 y.o.female admitted for hypertensive emergency/urgency, patient is improving but BP remains not  at goal, continue to monitor as above.           Veronika Maravilla  Internal Medicine, Hospitalist  Count includes the [facility]  [place]              Veronika Maravilla, DO

## 2024-10-31 NOTE — PROGRESS NOTES
Occupational Therapy    Evaluation    Patient Name: Anita Calabrese  MRN: [id number]  Today's Date: 10/31/2024  Time In: 1025  Time Out: 1046  328/328-A    Damaris Smith OTR/L was present throughout this session to supervise this patient's care    Assessment  IP OT Assessment  OT Assessment: This hospitilization on 10/30/2024 due to #Hypertensive, #Concern for fluttering/chest pain, #Elevated troponin, #Acute myocardial injury, #Concern for UTI, #Hypomagnesemia. Recent hospitilization 6 days ago at [facility] for L THR. Patient would benefit from continued OT services to promote patient independence.  Prognosis: Good  Evaluation/Treatment Tolerance: Patient tolerated treatment well  Medical Staff Made Aware: Yes  End of Session Communication: Bedside nurse  End of Session Patient Position: Up in chair, Alarm on, call-light within reach     Plan:  Treatment Interventions: ADL retraining, Functional transfer training, Endurance training, Patient/family training, Equipment evaluation/education, Compensatory technique education (energy conservation techniques; diaphragmatic breathing/ total hip precautions)  OT Frequency: 3 times per week  OT Discharge Recommendations: Low intensity level of continued care  OT - OK to Discharge: Yes to next level of care when medically cleared by physician/medical team     Subjective   Current Problem:  1. Hypomagnesemia        2. Chest pain, rule out acute myocardial infarction        3. Hypertensive emergency  Renal artery duplex complete    Renal artery duplex complete      4. Essential (primary) hypertension  Renal artery duplex complete    Renal artery duplex complete        General:  General  Reason for Referral: ADL, Safety Assessment, Recent surgery (MAE)  Referred By: Veronika Maravilla DO  Past Medical History Relevant to Rehab: HTN, HLD, CAD, GERD, OA, RSD, 1st [facility] L hand  Co-Treatment: PT  Co-Treatment Reason: to maximize safety for functional mobility  Prior to Session  Communication: Bedside nurse who confirmed that patient is medically stable to participate in this OT session   Patient Position Received: Bed, 2 rail up, Alarm on  General Comment: Patient agreeable to OT eval. Patient cooperative and able to complete all require tasks throughout session.    Precautions:  LE Weight Bearing Status: Weight Bearing as Tolerated ((LLE) Per PT eval 10/24/2024)  Medical Precautions: Fall precautions  Post-Surgical Precautions: Left hip precautions  Precautions Comment: UE IV    Pain:  Pain Assessment  Pain Assessment: 0-10  0-10 (Numeric) Pain Score: 7  Pain Type: Surgical pain  Pain Location: Hip  Pain Orientation: Left  Pain Descriptors: Aching, Sharp, Shooting  Pain Frequency: Constant/continuous  Pain Onset: Ongoing  Pain Interventions: Repositioned    Objective   Cognition:  Overall Cognitive Status: Within Functional Limits    Home Living:  Type of Home: House ([place])  Lives With: Adult children (daughter; works from 2 pm - 5 pm, granddaughter present when daughter at work; friend also available to assist as needed.)  Home Adaptive Equipment: Walker rolling or standard, Cane, Wheelchair-manual  Home Layout: One level, Able to live on main level with bedroom/bathroom, Laundry in basement  Home Access: Stairs to enter with rails (4 SHREYAS. 2 side steps without rail)  Bathroom Shower/Tub: Tub/shower unit  Bathroom Toilet: Standard  Bathroom Equipment: Grab bars in shower, Shower chair with back, Bedside commode, Raised toilet seat without rails  Home Living Comments: sleeps in adjustable bed     Prior Function:  Level of [place]: Independent with ADLs and functional transfers, Needs assistance with homemaking (Homemaking done by daughter; ambulated with cane)  Receives Help From: Family, Friends  Hand Dominance: Right  Prior Function Comments: daughter drives and shops; no history of falls    ADL:  ADL Comments: anticipated assistance for LB ADLs due to L hip precautions; to further  address during OT treatments    Activity Tolerance:  Endurance:  (Decreased)  Activity Tolerance Comments: limited due to L hip pain during functional tasks    Bed Mobility/Transfers:   Bed Mobility  Bed Mobility: Yes  Bed Mobility 1  Bed Mobility 1: Supine to sitting  Level of Assistance 1: Close supervision  Bed Mobility Comments 1: HOB elevated; uses towel to lift LLE  Transfers  Transfer: Yes  Transfer 1  Transfer From 1: Bed to  Technique 1: Sit to stand  Transfer Device 1: Cane  Transfer Level of Assistance 1: Contact guard  Transfers 2  Transfer to 2: Chair with arms (recliner)  Technique 2: Stand to sit  Transfer Device 2: Cane  Transfer Level of Assistance 2: Contact guard    Ambulation/Gait Training:  Functional Mobility  Functional Mobility Performed: Yes  Functional Mobility 1  Device 1: Single point cane  Assistance 1: Contact guard  Comments 1: ambulated within room    Sitting Balance:  Static Sitting Balance  Static Sitting-Level of Assistance: Independent  Static Sitting-Comment/Number of Minutes: 4 minutes    Sensation:  Light Touch: No apparent deficits    Hand Function:  Hand Function  Gross Grasp: Functional (R hand strength 4/5; L hand strength 4+/5)    Extremities: RUE   RUE : Within Functional Limits (Shoulder: AROM WFL and strength 4/5. Elbow: AROM WFL and strength 4-/5) and LUE   LUE: Within Functional Limits (Shoulder: AROM WFL and strength 4/5. Elbow: AROM WFL and strength 4/5)    Outcome Measures: [facility] Daily Activity  Putting on and taking off regular lower body clothing: A lot  Bathing (including washing, rinsing, drying): A little  Putting on and taking off regular upper body clothing: None  Toileting, which includes using toilet, bedpan or urinal: A lot  Taking care of personal grooming such as brushing teeth: None  Eating Meals: None  Daily Activity - Total Score: 19    EDUCATION:  Education Documentation  Precautions, taught by Hannah-Grace Knobloch, S-OT at 10/31/2024 12:09  PM.  Learner: Patient  Readiness: Acceptance  Method: Explanation, Demonstration  Response: Verbalizes Understanding, Demonstrated Understanding, Needs Reinforcement    Mobility Training, taught by Hannah-Grace Knobloch, S-OT at 10/31/2024 12:09 PM.  Learner: Patient  Readiness: Acceptance  Method: Explanation, Demonstration  Response: Verbalizes Understanding, Demonstrated Understanding, Needs Reinforcement    Goals:   Encounter Problems       Encounter Problems (Active)       OT Goals       Patient will complete sit to stand transfers from bed, chair and toilet independently utilizing DME as needed.   (Progressing)       Start:  10/31/24    Expected End:  11/07/24            Patient will utilize energy conservation techniques/diaphragmatic breathing techniques to improve activity tolerance during functional tasks.   (Progressing)       Start:  10/31/24    Expected End:  11/07/24            Patient will adhere to total hip precautions during functional task completion.   (Progressing)       Start:  10/31/24    Expected End:  11/07/24            Patient will complete lower body dressing/toileting with supervision using adaptive equipment as needed (Progressing)       Start:  10/31/24    Expected End:  11/07/24

## 2024-10-31 NOTE — CARE PLAN
Problem: Pain - Adult  Goal: Verbalizes/displays adequate comfort level or baseline comfort level  Outcome: Progressing     Problem: Safety - Adult  Goal: Free from fall injury  Outcome: Progressing     Problem: Discharge Planning  Goal: Discharge to home or other facility with appropriate resources  Outcome: Progressing     Problem: Chronic Conditions and Co-morbidities  Goal: Patient's chronic conditions and co-morbidity symptoms are monitored and maintained or improved  Outcome: Progressing   The patient's goals for the shift include  comfort/rest    The clinical goals for the shift include Patient will not c/o chest pain during shift

## 2024-10-31 NOTE — PROGRESS NOTES
Physical Therapy    Physical Therapy Evaluation    Patient Name: Anita Calabrese  MRN: [id number]  Today's Date: 10/31/2024   Time Calculation  Start Time: 1026  Stop Time: 1046  Time Calculation (min): 20 min  328/328-A    Assessment/Plan   PT Assessment  PT Assessment Results: Decreased strength, Decreased endurance, Impaired balance, Decreased mobility, Orthopedic restrictions, Pain  End of Session Communication: Bedside nurse  End of Session Patient Position: Up in chair, Alarm off, not on at start of session (All needs in reach and no complaints noted)  IP OR SWING BED PT PLAN  Inpatient or Swing Bed: Inpatient  PT Plan  Treatment/Interventions: Bed mobility, Transfer training, Gait training  PT Plan: Ongoing PT  PT Frequency: 4 times per week  PT Discharge Recommendations: Low intensity level of continued care  PT - OK to Discharge: Yes    Subjective     Current Problem:  1. Hypomagnesemia        2. Chest pain, rule out acute myocardial infarction        3. Hypertensive emergency  Renal artery duplex complete    Renal artery duplex complete      4. Essential (primary) hypertension  Renal artery duplex complete    Renal artery duplex complete        Patient Active Problem List   Diagnosis    Rheumatoid arthritis, involving unspecified site, unspecified whether rheumatoid factor present (Multi)    Atherosclerosis of coronary artery    Benign essential hypertension    Cervical spondylosis with radiculopathy    GERD (gastroesophageal reflux disease)    History of colonic polyps    Mixed hyperlipidemia    Osteoarthritis    Pain in both knees    Sleep apnea    Status post reverse total replacement of left shoulder    Trigeminal neuralgia    Vitamin D deficiency    Acute right-sided low back pain without sciatica    Status post right knee replacement    History of TIA (transient ischemic attack)    Dizziness    Shortness of breath    SORIA (dyspnea on exertion)    Pre-operative clearance    Hypomagnesemia     General  Visit Information:  General  Reason for Referral: PT Eval and Treat  Referred By: Veronika Maravilla DO  Past Medical History Relevant to Rehab: 74 y.o. female with a PMHx of HTN, HLD, CAD and GERD who presented to [facility] on  with a chief complaint of chest fluttering. 6 days ago left MAE at [facility]. Physical therapist checked vital signs today, blood pressure in 190s/100s.  She stated she was not feeling well since yesterday.  Blood pressure was extremely high today.  When asked about what she means by not feeling well, she stated that she was feeling weak and fatigued.  She was having shortness of breath but this has been chronic.  She stated having increased frequency of urination with suprapubic discomfort and dark urine, but she was also feeling that she has not drinking enough water.  Of note she had 1 episode of chest pain on Saturday, when she became sweaty and diaphoretic, the pain lasted for few seconds across her chest and disappeared with nitroglycerin.  Co-Treatment: OT  Co-Treatment Reason: maximize safety and functional mobility  Prior to Session Communication: Bedside nurse  Patient Position Received: Bed, 2 rail up, Alarm off, not on at start of session (Agreeable to PT)    Home Living:  Home Living  Home Living Comments: Pt lives with her daughter and granddaughter in a 1 story house with 4 SHREYAS with HR through front door and 2 SHREYAS without HR through side door. Bathroom has a tub/shower with seat and grab bar and has a raised toilet seat. Pt sleeps in an adjustable bed.    Prior Level of Function:  Prior Function Per Pt/Caregiver Report  Level of [place]: Independent with ADLs and functional transfers (Pt amb with SPC, is independent with ADLs, family does all IADLs, (-) driving, family drives)    Precautions:  Precautions  Precautions Comment: Fall precautions, L LE WBAT, total hip precautions (MAE on 10/24/24)    Objective     Pain:  Pain Assessment  Pain Assessment:  (7/10 L hip,  premedicated, repositioned for comfort s/p session)    Cognition:  Cognition  Overall Cognitive Status: Within Functional Limits    General Assessments:  Sensation  Light Touch: No apparent deficits  Strength  Strength Comments: B LE ROM WFL, B LE strength WFL  Dynamic Standing Balance  Dynamic Standing-Comments: Fair- with SPC and CGA to SBA    Functional Assessments:  Bed Mobility  Bed Mobility:  (supine to sitting: SUP with use of towel to lift L LE)  Transfers  Transfer:  (STS from EOB: CGA due to some L LE pain and stiffness)  Ambulation/Gait Training  Ambulation/Gait Training Performed:  (Pt amb 25' x 2 using SPC with CGA to SBA. Pt requires encouragement to amb.)    Outcome Measures:  [facility] Basic Mobility  Turning from your back to your side while in a flat bed without using bedrails: None  Moving from lying on your back to sitting on the side of a flat bed without using bedrails: A little  Moving to and from bed to chair (including a wheelchair): A little  Standing up from a chair using your arms (e.g. wheelchair or bedside chair): A little  To walk in hospital room: A little  Climbing 3-5 steps with railing: A little  Basic Mobility - Total Score: 19     Goals:  Encounter Problems       Encounter Problems (Active)       PT Problem       STG - Pt will transition supine <> sitting with mod I  (Progressing)       Start:  10/31/24    Expected End:  11/14/24            STG - Pt will transfer STS with mod I  (Progressing)       Start:  10/31/24    Expected End:  11/14/24            STG - Pt will amb 30' using SPC with SUP  (Progressing)       Start:  10/31/24    Expected End:  11/14/24            STG - Pt will perform a B LE ther ex program of 2-3 sets of 10  (Progressing)       Start:  10/31/24    Expected End:  11/14/24               Pain - Adult            Education Documentation  Mobility Training, taught by Pooja Gan PT at 10/31/2024 12:36 PM.  Learner: Patient  Readiness: Acceptance  Method:  Explanation  Response: Verbalizes Understanding    Education Comments  No comments found.

## 2024-11-01 ENCOUNTER — APPOINTMENT (OUTPATIENT)
Dept: VASCULAR MEDICINE | Facility: HOSPITAL | Age: 74
End: 2024-11-01
Payer: MEDICARE

## 2024-11-01 LAB
ALBUMIN SERPL BCP-MCNC: 3.3 G/DL (ref 3.4–5)
ANION GAP SERPL CALC-SCNC: 12 MMOL/L (ref 10–20)
BUN SERPL-MCNC: 12 MG/DL (ref 6–23)
CALCIUM SERPL-MCNC: 9.3 MG/DL (ref 8.6–10.3)
CHLORIDE SERPL-SCNC: 99 MMOL/L (ref 98–107)
CO2 SERPL-SCNC: 30 MMOL/L (ref 21–32)
CREAT SERPL-MCNC: 0.93 MG/DL (ref 0.5–1.05)
EGFRCR SERPLBLD CKD-EPI 2021: 64 ML/MIN/1.73M*2
ERYTHROCYTE [DISTWIDTH] IN BLOOD BY AUTOMATED COUNT: 14.5 % (ref 11.5–14.5)
GLUCOSE SERPL-MCNC: 99 MG/DL (ref 74–99)
HCT VFR BLD AUTO: 29.7 % (ref 36–46)
HGB BLD-MCNC: 9.6 G/DL (ref 12–16)
MAGNESIUM SERPL-MCNC: 1.72 MG/DL (ref 1.6–2.4)
MCH RBC QN AUTO: 31 PG (ref 26–34)
MCHC RBC AUTO-ENTMCNC: 32.3 G/DL (ref 32–36)
MCV RBC AUTO: 96 FL (ref 80–100)
NRBC BLD-RTO: 0 /100 WBCS (ref 0–0)
PHOSPHATE SERPL-MCNC: 3.6 MG/DL (ref 2.5–4.9)
PLATELET # BLD AUTO: 285 X10*3/UL (ref 150–450)
POTASSIUM SERPL-SCNC: 4 MMOL/L (ref 3.5–5.3)
RBC # BLD AUTO: 3.1 X10*6/UL (ref 4–5.2)
SODIUM SERPL-SCNC: 137 MMOL/L (ref 136–145)
WBC # BLD AUTO: 5.6 X10*3/UL (ref 4.4–11.3)

## 2024-11-01 PROCEDURE — 1200000002 HC GENERAL ROOM WITH TELEMETRY DAILY

## 2024-11-01 PROCEDURE — 83735 ASSAY OF MAGNESIUM: CPT | Performed by: INTERNAL MEDICINE

## 2024-11-01 PROCEDURE — 99232 SBSQ HOSP IP/OBS MODERATE 35: CPT | Performed by: INTERNAL MEDICINE

## 2024-11-01 PROCEDURE — 93975 VASCULAR STUDY: CPT | Performed by: INTERNAL MEDICINE

## 2024-11-01 PROCEDURE — 97535 SELF CARE MNGMENT TRAINING: CPT | Mod: GP,CQ

## 2024-11-01 PROCEDURE — 93975 VASCULAR STUDY: CPT

## 2024-11-01 PROCEDURE — 36415 COLL VENOUS BLD VENIPUNCTURE: CPT | Performed by: INTERNAL MEDICINE

## 2024-11-01 PROCEDURE — 85027 COMPLETE CBC AUTOMATED: CPT | Performed by: INTERNAL MEDICINE

## 2024-11-01 PROCEDURE — 2500000001 HC RX 250 WO HCPCS SELF ADMINISTERED DRUGS (ALT 637 FOR MEDICARE OP): Performed by: INTERNAL MEDICINE

## 2024-11-01 PROCEDURE — 97116 GAIT TRAINING THERAPY: CPT | Mod: GP,CQ

## 2024-11-01 PROCEDURE — 2500000004 HC RX 250 GENERAL PHARMACY W/ HCPCS (ALT 636 FOR OP/ED): Performed by: INTERNAL MEDICINE

## 2024-11-01 PROCEDURE — 80069 RENAL FUNCTION PANEL: CPT | Performed by: INTERNAL MEDICINE

## 2024-11-01 RX ORDER — CARVEDILOL 12.5 MG/1
12.5 TABLET ORAL
Status: DISCONTINUED | OUTPATIENT
Start: 2024-11-01 | End: 2024-11-02

## 2024-11-01 ASSESSMENT — COGNITIVE AND FUNCTIONAL STATUS - GENERAL
WALKING IN HOSPITAL ROOM: A LITTLE
MOBILITY SCORE: 19
WALKING IN HOSPITAL ROOM: A LITTLE
DRESSING REGULAR LOWER BODY CLOTHING: A LITTLE
CLIMB 3 TO 5 STEPS WITH RAILING: A LOT
MOVING TO AND FROM BED TO CHAIR: A LITTLE
TURNING FROM BACK TO SIDE WHILE IN FLAT BAD: A LITTLE
CLIMB 3 TO 5 STEPS WITH RAILING: A LITTLE
MOBILITY SCORE: 19
TOILETING: A LITTLE
HELP NEEDED FOR BATHING: A LITTLE
STANDING UP FROM CHAIR USING ARMS: A LITTLE
DAILY ACTIVITIY SCORE: 21
STANDING UP FROM CHAIR USING ARMS: A LITTLE
MOVING TO AND FROM BED TO CHAIR: A LITTLE

## 2024-11-01 ASSESSMENT — PAIN DESCRIPTION - DESCRIPTORS
DESCRIPTORS: ACHING;CRAMPING
DESCRIPTORS: ACHING

## 2024-11-01 ASSESSMENT — PAIN - FUNCTIONAL ASSESSMENT
PAIN_FUNCTIONAL_ASSESSMENT: 0-10

## 2024-11-01 ASSESSMENT — PAIN SCALES - GENERAL
PAINLEVEL_OUTOF10: 8
PAINLEVEL_OUTOF10: 2
PAINLEVEL_OUTOF10: 7
PAINLEVEL_OUTOF10: 6
PAINLEVEL_OUTOF10: 3

## 2024-11-01 ASSESSMENT — PAIN DESCRIPTION - LOCATION: LOCATION: HIP

## 2024-11-01 NOTE — PROGRESS NOTES
Physical Therapy    Physical Therapy Treatment    Patient Name: Anita Calabrese  MRN: [id number]  Department: [facility]  Room: [address] [place] [address]  Today's Date: 11/1/2024  Time Calculation  Start Time: 1005  Stop Time: 1030  Time Calculation (min): 25 min       Assessment/Plan   PT Assessment  End of Session Communication: Bedside nurse, PCT/NA/CTA  End of Session Patient Position: Up in chair, Alarm on     PT Plan  Treatment/Interventions: Bed mobility, Transfer training, Gait training  PT Plan: Ongoing PT  PT Frequency: 4 times per week  PT Discharge Recommendations: Low intensity level of continued care  PT - OK to Discharge: Yes      General Visit Information:   PT  Visit  PT Received On: 11/01/24  General  Prior to Session Communication: Bedside nurse  Patient Position Received: Bed, 3 rail up, Alarm off, not on at start of session  General Comment:  (pt agreeable to participate in therapy session with encouragement provided)    Subjective   Precautions:  Precautions  LE Weight Bearing Status:  (LLE WBAT)  Medical Precautions: Fall precautions  Post-Surgical Precautions: Left hip precautions (MAE 10/24)        Objective   Pain:  Pain Assessment  Pain Assessment: 0-10  0-10 (Numeric) Pain Score: 8  Pain Location:  (L hip)  Pain Interventions:  (cold packs provided end of tx session though pt stating she doesn't want to use them just yet)     Treatments:  Therapeutic Exercise  Therapeutic Exercise Performed:  (seated LLE AP and LAQ x15 ea.)    Bed Mobility  Bed Mobility:  (sup > sit with supervision; increased time to complete)    Ambulation/Gait Training  Ambulation/Gait Training Performed:  (pt ambulates 4 ft bed > chair with SPC and SBA.  after rest break pt ambulates additional ~20 ft x 2 and 6 ft BSC > chair with FWW (per pt request) and SBA.)    Transfers  Transfer:  (sit <> stand x4 trials with FWW and SBA)    Outcome Measures:  [facility] Basic Mobility  Turning from your back to your side while in a flat bed without  using bedrails: None  Moving from lying on your back to sitting on the side of a flat bed without using bedrails: A little  Moving to and from bed to chair (including a wheelchair): A little  Standing up from a chair using your arms (e.g. wheelchair or bedside chair): A little  To walk in hospital room: A little  Climbing 3-5 steps with railing: A little  Basic Mobility - Total Score: 19    Education Documentation  Mobility Training, taught by Susanna Anne PTA at 11/1/2024  3:06 PM.  Learner: Patient  Readiness: Acceptance  Method: Explanation  Response: Verbalizes Understanding    Education Comments  No comments found.        EDUCATION:       Encounter Problems       Encounter Problems (Active)       PT Problem       STG - Pt will transition supine <> sitting with mod I  (Progressing)       Start:  10/31/24    Expected End:  11/14/24            STG - Pt will transfer STS with mod I  (Progressing)       Start:  10/31/24    Expected End:  11/14/24            STG - Pt will amb 30' using SPC with SUP  (Progressing)       Start:  10/31/24    Expected End:  11/14/24            STG - Pt will perform a B LE ther ex program of 2-3 sets of 10  (Progressing)       Start:  10/31/24    Expected End:  11/14/24

## 2024-11-01 NOTE — PROGRESS NOTES
24 1542   Discharge Planning   Living Arrangements Children   Support Systems Family members   Assistance Needed None   Type of Residence Private residence   Number of Stairs to Enter Residence 2   Do you have animals or pets at home? Yes   Type of Animals or Pets 2 dogs   Who is requesting discharge planning? Provider   Home or Post Acute Services In home services   Type of Home Care Services Home OT;Home PT   Expected Discharge Disposition Home Health   Does the patient need discharge transport arranged? No     TCC Note: I met with pt at bedside, verified name, , insurance and demographics. ER contact is Janina Parmar phone: [phone] or Elena Cabrera [phone]. Pt lives with daughters does use walker, has not fallen and is Independent with ADLs. Pt denies any home going needs and family will transport home at the time of discharge. Will continue to follow. Courtney Lee, MSN, RN, TCC.

## 2024-11-02 ENCOUNTER — APPOINTMENT (OUTPATIENT)
Dept: RADIOLOGY | Facility: HOSPITAL | Age: 74
DRG: 305 | End: 2024-11-02
Payer: MEDICARE

## 2024-11-02 LAB
ALBUMIN SERPL BCP-MCNC: 3.3 G/DL (ref 3.4–5)
ANION GAP SERPL CALC-SCNC: 13 MMOL/L (ref 10–20)
BNP SERPL-MCNC: 25 PG/ML (ref 0–99)
BUN SERPL-MCNC: 17 MG/DL (ref 6–23)
CALCIUM SERPL-MCNC: 9.4 MG/DL (ref 8.6–10.3)
CHLORIDE SERPL-SCNC: 99 MMOL/L (ref 98–107)
CO2 SERPL-SCNC: 28 MMOL/L (ref 21–32)
CREAT SERPL-MCNC: 0.99 MG/DL (ref 0.5–1.05)
EGFRCR SERPLBLD CKD-EPI 2021: 59 ML/MIN/1.73M*2
ERYTHROCYTE [DISTWIDTH] IN BLOOD BY AUTOMATED COUNT: 14.5 % (ref 11.5–14.5)
GLUCOSE SERPL-MCNC: 107 MG/DL (ref 74–99)
HCT VFR BLD AUTO: 30.1 % (ref 36–46)
HGB BLD-MCNC: 9.5 G/DL (ref 12–16)
MAGNESIUM SERPL-MCNC: 1.66 MG/DL (ref 1.6–2.4)
MCH RBC QN AUTO: 30.3 PG (ref 26–34)
MCHC RBC AUTO-ENTMCNC: 31.6 G/DL (ref 32–36)
MCV RBC AUTO: 96 FL (ref 80–100)
NRBC BLD-RTO: 0 /100 WBCS (ref 0–0)
PHOSPHATE SERPL-MCNC: 4.4 MG/DL (ref 2.5–4.9)
PLATELET # BLD AUTO: 299 X10*3/UL (ref 150–450)
POTASSIUM SERPL-SCNC: 4.1 MMOL/L (ref 3.5–5.3)
RBC # BLD AUTO: 3.14 X10*6/UL (ref 4–5.2)
SODIUM SERPL-SCNC: 136 MMOL/L (ref 136–145)
WBC # BLD AUTO: 5.8 X10*3/UL (ref 4.4–11.3)

## 2024-11-02 PROCEDURE — 71275 CT ANGIOGRAPHY CHEST: CPT | Performed by: RADIOLOGY

## 2024-11-02 PROCEDURE — 2500000004 HC RX 250 GENERAL PHARMACY W/ HCPCS (ALT 636 FOR OP/ED): Performed by: INTERNAL MEDICINE

## 2024-11-02 PROCEDURE — 36415 COLL VENOUS BLD VENIPUNCTURE: CPT | Performed by: INTERNAL MEDICINE

## 2024-11-02 PROCEDURE — 83735 ASSAY OF MAGNESIUM: CPT | Performed by: INTERNAL MEDICINE

## 2024-11-02 PROCEDURE — 2550000001 HC RX 255 CONTRASTS: Performed by: INTERNAL MEDICINE

## 2024-11-02 PROCEDURE — 83880 ASSAY OF NATRIURETIC PEPTIDE: CPT | Performed by: INTERNAL MEDICINE

## 2024-11-02 PROCEDURE — 80069 RENAL FUNCTION PANEL: CPT | Performed by: INTERNAL MEDICINE

## 2024-11-02 PROCEDURE — 85027 COMPLETE CBC AUTOMATED: CPT | Performed by: INTERNAL MEDICINE

## 2024-11-02 PROCEDURE — 2500000001 HC RX 250 WO HCPCS SELF ADMINISTERED DRUGS (ALT 637 FOR MEDICARE OP): Performed by: INTERNAL MEDICINE

## 2024-11-02 PROCEDURE — 99222 1ST HOSP IP/OBS MODERATE 55: CPT | Performed by: INTERNAL MEDICINE

## 2024-11-02 PROCEDURE — 99233 SBSQ HOSP IP/OBS HIGH 50: CPT | Performed by: INTERNAL MEDICINE

## 2024-11-02 PROCEDURE — 1200000002 HC GENERAL ROOM WITH TELEMETRY DAILY

## 2024-11-02 PROCEDURE — 71275 CT ANGIOGRAPHY CHEST: CPT

## 2024-11-02 RX ORDER — CARVEDILOL 25 MG/1
25 TABLET ORAL
Status: DISCONTINUED | OUTPATIENT
Start: 2024-11-02 | End: 2024-11-03 | Stop reason: HOSPADM

## 2024-11-02 ASSESSMENT — COGNITIVE AND FUNCTIONAL STATUS - GENERAL
MOVING TO AND FROM BED TO CHAIR: A LITTLE
WALKING IN HOSPITAL ROOM: A LITTLE
TURNING FROM BACK TO SIDE WHILE IN FLAT BAD: A LITTLE
PERSONAL GROOMING: A LITTLE
HELP NEEDED FOR BATHING: A LITTLE
TOILETING: A LITTLE
HELP NEEDED FOR BATHING: A LITTLE
TOILETING: A LITTLE
CLIMB 3 TO 5 STEPS WITH RAILING: A LITTLE
STANDING UP FROM CHAIR USING ARMS: A LITTLE
EATING MEALS: A LITTLE
DAILY ACTIVITIY SCORE: 18
MOVING FROM LYING ON BACK TO SITTING ON SIDE OF FLAT BED WITH BEDRAILS: A LITTLE
DRESSING REGULAR UPPER BODY CLOTHING: A LITTLE
MOBILITY SCORE: 23
MOBILITY SCORE: 18
CLIMB 3 TO 5 STEPS WITH RAILING: A LITTLE
DRESSING REGULAR LOWER BODY CLOTHING: A LITTLE
DRESSING REGULAR LOWER BODY CLOTHING: A LITTLE

## 2024-11-02 ASSESSMENT — PAIN DESCRIPTION - LOCATION
LOCATION: HIP

## 2024-11-02 ASSESSMENT — PAIN - FUNCTIONAL ASSESSMENT
PAIN_FUNCTIONAL_ASSESSMENT: 0-10

## 2024-11-02 ASSESSMENT — PAIN SCALES - GENERAL
PAINLEVEL_OUTOF10: 0 - NO PAIN
PAINLEVEL_OUTOF10: 3
PAINLEVEL_OUTOF10: 0 - NO PAIN
PAINLEVEL_OUTOF10: 10 - WORST POSSIBLE PAIN
PAINLEVEL_OUTOF10: 6
PAINLEVEL_OUTOF10: 9
PAINLEVEL_OUTOF10: 10 - WORST POSSIBLE PAIN
PAINLEVEL_OUTOF10: 0 - NO PAIN

## 2024-11-02 ASSESSMENT — PAIN DESCRIPTION - ORIENTATION
ORIENTATION: LEFT

## 2024-11-02 ASSESSMENT — PAIN SCALES - PAIN ASSESSMENT IN ADVANCED DEMENTIA (PAINAD)
TOTALSCORE: MEDICATION (SEE MAR);COLD APPLIED
TOTALSCORE: MEDICATION (SEE MAR)

## 2024-11-02 ASSESSMENT — PAIN DESCRIPTION - DESCRIPTORS: DESCRIPTORS: ACHING;CRAMPING;DISCOMFORT

## 2024-11-02 NOTE — CARE PLAN
The patient's goals for the shift include      The clinical goals for the shift include Patients Blood pressures will be maintained throughout the shift    Over the shift, the patient did not make progress toward the following goals. Barriers to progression include Increased bps. Recommendations to address these barriers include medications.

## 2024-11-02 NOTE — PROGRESS NOTES
Anita Calabrese is a 74 y.o. female on day 0 of admission presenting with Hypomagnesemia.      Subjective   Patient seen and examined at bedside, she was having some pain from her surgery, but she was feeling better. Her grand daughter was at bedside.  She did not have any chest pain or fluttering. Denies any f/c, n/v, new onset headaches, vision changes, chest pain, dyspnea, abdominal pain, changes in BM, or urinary changes.         Objective     Last Recorded Vitals  /68 (BP Location: Right arm, Patient Position: Lying)   Pulse 84   Temp 36 °C (96.8 °F) (Temporal)   Resp 16   Wt 97.5 kg (215 lb)   SpO2 95%   Intake/Output last 3 Shifts:    Intake/Output Summary (Last 24 hours) at 11/1/2024 2053  Last data filed at 11/1/2024 1100  Gross per 24 hour   Intake --   Output 1800 ml   Net -1800 ml       Admission Weight  Weight: 97.5 kg (215 lb) (10/30/24 1216)    Daily Weight  10/30/24 : 97.5 kg (215 lb)    Image Results  Renal artery duplex complete             [place] [address]  Tel [phone] and Fax [phone]       Vascular Lab Report  [facility] RENAL ARTERY DUPLEX COMPLETE       Patient Name:      ANITA CALABRESE Reading Physician: [id number] Vandana Cabrera MD  Study Date:        11/1/2024           Ordering           [id number] EVELYNE BENAVIDES                                         Physician:         CHRIS  MRN/PID:           [id number]            Technologist:      Senia Mackenzie T  Accession#:        [id number]        Technologist 2:  Date of Birth/Age: 1950 / 74      Encounter#:        [id number]                     years  Gender:            F  Admission Status:  Inpatient           Location           [facility]                                         Performed:       Diagnosis/ICD: Renovascular hypertension-I15.0  CPT Codes:     98475 Abdominal Visceral Renal       CONCLUSIONS:  Right Renal Artery: Right renal arteries demonstrate no evidence of  hemodynamically significant stenosis. The right renal veins are widely patent.  Left Renal Artery: Left renal arteries demonstrate no evidence of hemodynamically significant stenosis. The left renal veins are widely patent.     Imaging & Doppler Findings:     AORTA    PSV   Mid  78.1 cm/s       Renal Artery Duplex Right Kidney: 9.4 cm                           Left Kidney: 9.2 cm        Systolic       Diastolic     ARTERY           Systolic       Diastolic        73 cm/s         11 cm/s      Origin           144 cm/s        33 cm/s        108 cm/s        27 cm/s       Prox            110 cm/s        23 cm/s        85 cm/s         28 cm/s        Mid            121 cm/s        36 cm/s        96 cm/s         27 cm/s      Distal            99 cm/s        34 cm/s        62 cm/s         15 cm/s     Superior           57 cm/s        15 cm/s        53 cm/s         15 cm/s     Inferior           49 cm/s        12 cm/s                         Right                          Left                          1.4       R/A Ratio            1.8                          0.8    Resistive Index         0.7       Ao Dist Diam 1.56 cm  Mid Ao       78 cm/s          [id number] Vandana Cabrera MD  Electronically signed by [id number] Vandana Cabrera MD on 11/1/2024 at 3:25:25 PM       ** Final **      Physical Exam  Constitutional:       Appearance: Normal appearance. She is obese.   HENT:      Head: Normocephalic and atraumatic.      Right Ear: Tympanic membrane normal.      Left Ear: Tympanic membrane normal.      Nose: Nose normal.      Mouth/Throat:      Mouth: Mucous membranes are moist.      Pharynx: Oropharynx is clear.   Eyes:      General: No scleral icterus.        Right eye: No discharge.         Left eye: No discharge.      Extraocular Movements: Extraocular movements intact.      Conjunctiva/sclera: Conjunctivae normal.      Pupils: Pupils are equal, round, and reactive to light.   Neck:      Vascular: No carotid bruit.   Cardiovascular:       Rate and Rhythm: Normal rate and regular rhythm.      Pulses: Normal pulses.      Heart sounds: No murmur heard.     No friction rub. No gallop.   Pulmonary:      Effort: Pulmonary effort is normal.      Breath sounds: Normal breath sounds.   Abdominal:      General: Bowel sounds are normal. There is no distension.      Palpations: Abdomen is soft. There is no mass.      Tenderness: There is no abdominal tenderness. There is no right CVA tenderness, left CVA tenderness, guarding or rebound.      Hernia: No hernia is present.   Musculoskeletal:         General: No swelling, tenderness, deformity or signs of injury.      Cervical back: Normal range of motion and neck supple. No rigidity or tenderness.      Right lower leg: No edema.      Left lower leg: No edema.      Comments: Left hip wrapped    Lymphadenopathy:      Cervical: No cervical adenopathy.   Skin:     General: Skin is warm.      Coloration: Skin is not jaundiced or pale.      Findings: No bruising, erythema, lesion or rash.   Neurological:      General: No focal deficit present.      Mental Status: She is alert and oriented to person, place, and time. Mental status is at baseline.   Psychiatric:         Mood and Affect: Mood normal.         Behavior: Behavior normal.         Thought Content: Thought content normal.         Judgment: Judgment normal.         Relevant Results    Scheduled medications  amLODIPine, 5 mg, oral, Daily  aspirin, 81 mg, oral, BID  atorvastatin, 40 mg, oral, Daily  carvedilol, 12.5 mg, oral, BID  cyclobenzaprine, 10 mg, oral, Nightly  docusate sodium, 100 mg, oral, q12h ANDREW  enoxaparin, 40 mg, subcutaneous, q24h  famotidine, 20 mg, oral, BID  folic acid, 1 mg, oral, Daily  hydroCHLOROthiazide, 50 mg, oral, Daily  losartan, 100 mg, oral, Daily      Continuous medications     PRN medications  PRN medications: acetaminophen, albuterol, HYDROmorphone, labetaloL, nitroglycerin, oxyCODONE-acetaminophen, polyethylene glycol                  Assessment/Plan      Assessment & Plan  Hypomagnesemia    Anita Calabrese is a 74 y.o. female admitted for fatigue, high blood pressure and possible fluttering.     Acute Medical Issues   # Hypertensive emergency/urgency (Resolved)  -Patient's blood pressure was severely elevated in the ED, with maximum reaching systolic above 200 once  -Patient responded well to labetalol 20 mg IV.  -Resume home medications, hydrochlorothiazide 50 mg daily, losartan 100 mg daily, better controlled after  adding  amlodipine 5 mg, but not at goal, carvedilol 12.5 mg twice daily, with better control of BP.  - Renal duplex ordered to rule out RA stenosis; negative.           #Concern for fluttering/chest pain:  -Patient had 1 episode of chest pain, on Saturday that subsided spontaneously.  -Less likely cardiac in nature, EKG with no ischemic changes.  -Will continue to monitor, and will consider cardiology consult if needed.        #Elevated troponin (Resolved):  -Mildly elevated troponin, likely secondary to demand ischemia type II MI.  -EKG with no ischemic changes according to the ED note.  - troponin down trended      #  UTI ruled out:  -Patient has suprapubic pain, with increased frequency.  -UA with reflex microscopy and culture; negative for infections.  -Discontinue ceftriaxone.      #Hypomagnesemia:  - Repleted in the ED  -Improved, but given more to increase level above 2.       Chronic Medical Issues   # CAD/HLD:  -Continue ASA 81 and atorvastatin 40 mg     # Possible COPD:  -Continue albuterol inhaler as needed        #S/p surgery for total hip replacement:  -Pain management and continue to monitor, Flexeril 10 mg nightly.  -PT/OT while in-house     F: PO intake & IVF PRN  E: Replete as needed  N: Cardiac  GI ppx: Famotidine 20 mg daily   DVT ppx: Lovenox subcutaneous  Antibiotics: Ceftriaxone  Oxygenation: RA     Code Status: Full Code   Emergency Contact: Extended Emergency Contact Information  Primary Emergency  Contact: TAJ UNGER  Mobile Phone: [phone]  Relation: Daughter  Preferred language: English   needed? No  Secondary Emergency Contact: RAFAEL UNGER  Mobile Phone: [phone]  Relation: Daughter  Preferred language: English   needed? No      Disposition: 74 y.o.female admitted for hypertensive emergency/urgency, patient is improving, continue to monitor as above, possible discharge tomorrow.           Veronika Maravilla  Internal Medicine, Hospitalist  [facility]  [place]

## 2024-11-02 NOTE — CONSULTS
Cardiology Consult Note    Inpatient consult to Cardiology  Consult performed by: Elly Krishnan MD  Consult ordered by: DO Anita Issa Bharati is a 74 y.o. female on day 1 of admission presenting with Hypomagnesemia.      Subjective   This is a 74-year-old female who was admitted on 10/30/2024 with hypertension, fluttering in her chest, and a sense of anxiety.  I have asked to see her today to review her antihypertensive regimen.  She was recently in [facility] where she underwent a left hip replacement.  When she arrived home she was fine.  Monday present nurses checked her blood pressure was elevated.  She was not feeling well.  She therefore came to the hospital.  She had increased urination with the dark urine and was not drinking enough water.  1 vague episode of chest pain on Saturday while at [facility] which she did not tell anybody about.  It lasted less than 1 minute.  There was no nitroglycerin given that she did not tell anybody of the pain.  She has never had that before nor has she had it since.  Here during this admission carvedilol was added to her regimen and uptitrated.  Blood pressures have improved.  She feels totally fine at this time.  Recent regadenoson nuclear stress testing and echocardiography were in fact normal.     ROS:  10 systems reviewed other than what is mentioned above.     Past Medical History:  Past Medical History:   Diagnosis Date    Atypical chest pain 05/19/2023    Primary osteoarthritis of first carpometacarpal joint of left hand 02/15/2018    RSD (reflex sympathetic dystrophy) 05/19/2023    Wheezing 06/11/2021       Problem List Items Addressed This Visit       * (Principal) Hypomagnesemia - Primary     Other Visit Diagnoses       Chest pain, rule out acute myocardial infarction        Hypertensive emergency        Relevant Orders    Renal artery duplex complete (Completed)    Essential (primary) hypertension        Relevant Orders    Renal artery duplex  complete (Completed)    Renovascular hypertension              Family History:  No relevant family history has been documented for this patient.    Medications:  Prior to Admission medications    Medication Sig Start Date End Date Taking? Authorizing Provider   acetaminophen (Tylenol) 500 mg tablet Take 1 tablet (500 mg) by mouth every 6 hours if needed for mild pain (1 - 3). 10/26/24  Yes Historical Provider, MD   albuterol 90 mcg/actuation inhaler Inhale 2 puffs every 4 hours if needed for shortness of breath or wheezing. 7/17/20  Yes Historical Provider, MD   aspirin 81 mg EC tablet Take 1 tablet (81 mg) by mouth 2 times a day. 10/26/24 12/11/24 Yes Historical Provider, MD   atorvastatin (Lipitor) 40 mg tablet Take 1 tablet (40 mg) by mouth once daily. 1/30/24 1/29/25 Yes Tracy M Schwab, APRN-CNP   cyclobenzaprine (Flexeril) 5 mg tablet Take 1-2 tablets (5-10 mg) by mouth once daily at bedtime. 8/13/24 11/11/24 Yes Ben Byrd MD   docusate sodium (Colace) 100 mg capsule Take 1 capsule (100 mg) by mouth every 12 hours. 10/26/24  Yes Historical Provider, MD   famotidine (Pepcid) 20 mg tablet TAKE ONE TABLET BY MOUTH TWO TIMES A DAY 5/9/24  Yes Jose Angel Ragland, DO   folic acid (Folvite) 1 mg tablet Take 1 tablet (1 mg) by mouth once daily.   Yes Historical Provider, MD   gabapentin (Neurontin) 600 mg tablet 1 tablet (600 mg). 6/12/19  Yes Historical Provider, MD   hydroCHLOROthiazide (HYDRODiuril) 25 mg tablet TAKE 2 TABLETS(50 MG) BY MOUTH EVERY DAY  Patient taking differently: Take 2 tablets (50 mg) by mouth once daily. 10/30/23  Yes Kamilah Osei, DO   hydroxychloroquine (Plaquenil) 200 mg tablet Take 1 tablet (200 mg) by mouth 2 times a day. 5/17/22  Yes Historical Provider, MD   lansoprazole (Prevacid) 30 mg DR capsule Take 1 capsule (30 mg) by mouth once daily. Do not crush or chew. 3/13/24 3/13/25 Yes Marly Rodriguez MD   losartan (Cozaar) 100 mg tablet Take 1 tablet (100 mg) by mouth once daily.  3/27/24  Yes Jose Angel Ragland,    ondansetron (Zofran) 4 mg tablet Take 1 tablet (4 mg) by mouth every 12 hours if needed for nausea. 10/26/24  Yes Historical Provider, MD   oxyCODONE (Roxicodone) 5 mg immediate release tablet Take 1-2 tablets (5-10 mg) by mouth every 6 hours if needed for severe pain (7 - 10) or moderate pain (4 - 6). 10/26/24 11/2/24 Yes Historical Provider, MD   benzonatate (Tessalon) 100 mg capsule Take 1 capsule (100 mg) by mouth 3 times a day as needed for cough.  Patient not taking: Reported on 10/30/2024 1/18/24   Historical Provider, MD   naloxone (Narcan) 4 mg/0.1 mL nasal spray Administer 1 spray (4 mg) into affected nostril(s) if needed for opioid reversal. May repeat every 2-3 minutes if needed, alternating nostrils, until medical assistance becomes available. 8/13/24   Ben Byrd MD   nitroglycerin (Nitrostat) 0.4 mg SL tablet Place 1 tablet (0.4 mg) under the tongue every 5 minutes if needed. 1/18/24   Historical Provider, MD     Current Facility-Administered Medications   Medication Dose Route Frequency Provider Last Rate Last Admin    acetaminophen (Tylenol) tablet 487.5 mg  487.5 mg oral q6h PRN Veronika Maravilla, DO   487.5 mg at 11/02/24 0935    albuterol 90 mcg/actuation inhaler 2 puff  2 puff inhalation q2h PRN Veronika Maravilla, DO        amLODIPine (Norvasc) tablet 5 mg  5 mg oral Daily Dilmaamed A Taiwo, DO   5 mg at 11/02/24 0931    aspirin EC tablet 81 mg  81 mg oral BID Dilmaamed A Taiwo, DO   81 mg at 11/02/24 0931    atorvastatin (Lipitor) tablet 40 mg  40 mg oral Daily Dilmaamed A Taiwo, DO   40 mg at 11/02/24 0931    carvedilol (Coreg) tablet 25 mg  25 mg oral BID Mehrdad Kohler MD        cyclobenzaprine (Flexeril) tablet 10 mg  10 mg oral Nightly Veronika Maravilla, DO   10 mg at 11/01/24 2037    docusate sodium (Colace) capsule 100 mg  100 mg oral q12h [facility] Veronika Maravilla DO   100 mg at 11/02/24 0638    enoxaparin (Lovenox) syringe 40 mg  40 mg  subcutaneous q24h Mohamed A Taiwo, DO   40 mg at 24 1454    famotidine (Pepcid) tablet 20 mg  20 mg oral BID Mohamed A Taiwo, DO   20 mg at 24 0931    folic acid (Folvite) tablet 1 mg  1 mg oral Daily Mohamed A Taiwo, DO   1 mg at 24 0931    hydroCHLOROthiazide (HYDRODiuril) tablet 50 mg  50 mg oral Daily Mohamed A Taiwo, DO   50 mg at 24 0931    HYDROmorphone PF (Dilaudid) injection 0.2 mg  0.2 mg intravenous q4h PRN Mohamed A Taiwo, DO        labetaloL (Normodyne,Trandate) injection 20 mg  20 mg intravenous q4h PRN Mohamed A Taiwo, DO        losartan (Cozaar) tablet 100 mg  100 mg oral Daily Mohamed A Taiwo, DO   100 mg at 24 0931    nitroglycerin (Nitrostat) SL tablet 0.4 mg  0.4 mg sublingual q5 min PRN Dilmaamed A Taiwo, DO        oxyCODONE-acetaminophen (Percocet) 5-325 mg per tablet 1 tablet  1 tablet oral q6h PRN Veronika A Taiwo, DO   1 tablet at 24 1018    polyethylene glycol (Glycolax, Miralax) packet 17 g  17 g oral Daily PRN Veronika A Taiwo, DO         Allergies:  Allergies   Allergen Reactions    Azithromycin Hallucinations    Aspirin Swelling    Diclofenac Swelling    Salicylates Swelling    Corticosteroids (Glucocorticoids) Swelling    Penicillins Unknown    Prednisone Swelling     Social History:  Social History     Tobacco Use    Smoking status: Former     Current packs/day: 0.00     Types: Cigarettes     Quit date:      Years since quittin.8     Passive exposure: Past    Smokeless tobacco: Never   Substance Use Topics    Alcohol use: Not Currently     Comment: RARELY     Physical Exam:  Last Recorded Vitals  /58   Pulse 86   Temp 35.4 °C (95.7 °F)   Resp 18   Wt 97.5 kg (215 lb)   SpO2 91%   Intake/Output last 3 Shifts:    Intake/Output Summary (Last 24 hours) at 2024 1532  Last data filed at 2024 0600  Gross per 24 hour   Intake 480 ml   Output 850 ml   Net -370 ml       Admission Weight  Weight: 97.5 kg (215 lb)  (10/30/24 1216)    Daily Weight  10/30/24 : 97.5 kg (215 lb)      Patient is alert and oriented x3.  HEENT is unremarkable mucous members are moist  Neck no JVP no bruits upstrokes are full no thyromegaly  Lungs are clear bilaterally.  No wheezing crackles or rales  Heart regular rhythm normal S1-S2 there is no S3 no murmurs are heard.  Abdomen is soft bs are positive nontender nondistended no organomegaly no pulsatile masses  Extremities have no edema.  Distal pulses present palpable.  Neuro is grossly nonfocal  Skin has no rashes     Image Results  Renal artery duplex complete             [facility]  [address], [place] [address]  Tel [phone] and Fax [phone]       Vascular Lab Report  [facility] US RENAL ARTERY DUPLEX COMPLETE       Patient Name:      MAURICE RADFORDLINTON Reading Physician: [id number] Vandana Cabrera MD  Study Date:        11/1/2024           Ordering           [id number] EVELYNE BENAVIDES                                         Physician:         CHRIS  MRN/PID:           [id number]            Technologist:      Senia Mackenzie T  Accession#:        [id number]        Technologist 2:  Date of Birth/Age: 1950 / 74      Encounter#:        [id number]                     years  Gender:            F  Admission Status:  Inpatient           Location           [facility]                                         Performed:       Diagnosis/ICD: Renovascular hypertension-I15.0  CPT Codes:     73668 Abdominal Visceral Renal       CONCLUSIONS:  Right Renal Artery: Right renal arteries demonstrate no evidence of hemodynamically significant stenosis. The right renal veins are widely patent.  Left Renal Artery: Left renal arteries demonstrate no evidence of hemodynamically significant stenosis. The left renal veins are widely patent.     Imaging & Doppler Findings:     AORTA    PSV   Mid  78.1 cm/s       Renal Artery Duplex Right Kidney: 9.4 cm                           Left Kidney: 9.2 cm         Systolic       Diastolic     ARTERY           Systolic       Diastolic        73 cm/s         11 cm/s      Origin           144 cm/s        33 cm/s        108 cm/s        27 cm/s       Prox            110 cm/s        23 cm/s        85 cm/s         28 cm/s        Mid            121 cm/s        36 cm/s        96 cm/s         27 cm/s      Distal            99 cm/s        34 cm/s        62 cm/s         15 cm/s     Superior           57 cm/s        15 cm/s        53 cm/s         15 cm/s     Inferior           49 cm/s        12 cm/s                         Right                          Left                          1.4       R/A Ratio            1.8                          0.8    Resistive Index         0.7       Ao Dist Diam 1.56 cm  Mid Ao       78 cm/s          [id number] Vandana Cabrera MD  Electronically signed by [id number] Vandana Cabrera MD on 11/1/2024 at 3:25:25 PM       ** Final **        Relevant Results:  Results for orders placed or performed during the hospital encounter of 10/30/24 (from the past 24 hours)   CBC   Result Value Ref Range    WBC 5.8 4.4 - 11.3 x10*3/uL    nRBC 0.0 0.0 - 0.0 /100 WBCs    RBC 3.14 (L) 4.00 - 5.20 x10*6/uL    Hemoglobin 9.5 (L) 12.0 - 16.0 g/dL    Hematocrit 30.1 (L) 36.0 - 46.0 %    MCV 96 80 - 100 fL    MCH 30.3 26.0 - 34.0 pg    MCHC 31.6 (L) 32.0 - 36.0 g/dL    RDW 14.5 11.5 - 14.5 %    Platelets 299 150 - 450 x10*3/uL   Renal Function Panel   Result Value Ref Range    Glucose 107 (H) 74 - 99 mg/dL    Sodium 136 136 - 145 mmol/L    Potassium 4.1 3.5 - 5.3 mmol/L    Chloride 99 98 - 107 mmol/L    Bicarbonate 28 21 - 32 mmol/L    Anion Gap 13 10 - 20 mmol/L    Urea Nitrogen 17 6 - 23 mg/dL    Creatinine 0.99 0.50 - 1.05 mg/dL    eGFR 59 (L) >60 mL/min/1.73m*2    Calcium 9.4 8.6 - 10.3 mg/dL    Phosphorus 4.4 2.5 - 4.9 mg/dL    Albumin 3.3 (L) 3.4 - 5.0 g/dL   Magnesium   Result Value Ref Range    Magnesium 1.66 1.60 - 2.40 mg/dL       Results from last 7 days   Lab Units 11/02/24  0657  11/01/24  0729 10/31/24  0636 10/30/24  1221   PROTEIN TOTAL g/dL  --   --   --  6.6   BILIRUBIN TOTAL mg/dL  --   --   --  0.9   ALK PHOS U/L  --   --   --  68   ALT U/L  --   --   --  8   AST U/L  --   --   --  12   GLUCOSE mg/dL 107*   < > 117* 108*   TSH mIU/L  --   --  2.69  --    BNP pg/mL  --   --   --  63    < > = values in this interval not displayed.       Assessment/Plan      11/2/2024    1.  Hypertension.  Better though still not perfect blood pressure control.  Agree with the addition of carvedilol to her current regimen.  2.  Palpitations.Resolved.  No recurrence.  3.  Mildly elevated troponins.  Fairly flat.  Likely due to demand ischemia.  There is coronary artery disease based on coronary calcification.  Stress testing earlier this year was normal.  Continue with statins continue with aspirin    From a cardiac standpoint she is okay for discharge.  Follow-up as an outpatient with Tracy Schwab.    Thank you for this consult.    Elly Krishnan MD

## 2024-11-02 NOTE — CARE PLAN
The patient's goals for the shift include  DECREASED PAIN.    The clinical goals for the shift include PATIENT WILL HAVE DECREASED PAIN DURING SHIFT.    Over the shift, the patient did not make progress toward the following goals. Barriers to progression include HIP SURGERY ONE WEEK AGO AND CHEST PAIN. Recommendations to address these barriers include PAIN MEDICINE AND CONSULTS.

## 2024-11-03 ENCOUNTER — APPOINTMENT (OUTPATIENT)
Dept: RADIOLOGY | Facility: HOSPITAL | Age: 74
DRG: 305 | End: 2024-11-03
Payer: MEDICARE

## 2024-11-03 VITALS
HEIGHT: 66 IN | HEART RATE: 91 BPM | TEMPERATURE: 95.4 F | SYSTOLIC BLOOD PRESSURE: 127 MMHG | OXYGEN SATURATION: 92 % | RESPIRATION RATE: 18 BRPM | DIASTOLIC BLOOD PRESSURE: 66 MMHG | BODY MASS INDEX: 34.55 KG/M2 | WEIGHT: 215 LBS

## 2024-11-03 DIAGNOSIS — R06.09 DOE (DYSPNEA ON EXERTION): Primary | ICD-10-CM

## 2024-11-03 PROBLEM — E83.42 HYPOMAGNESEMIA: Status: RESOLVED | Noted: 2024-10-30 | Resolved: 2024-11-03

## 2024-11-03 LAB
ALBUMIN SERPL BCP-MCNC: 3.5 G/DL (ref 3.4–5)
ANION GAP SERPL CALC-SCNC: 12 MMOL/L (ref 10–20)
BUN SERPL-MCNC: 18 MG/DL (ref 6–23)
CALCIUM SERPL-MCNC: 9.6 MG/DL (ref 8.6–10.3)
CHLORIDE SERPL-SCNC: 98 MMOL/L (ref 98–107)
CO2 SERPL-SCNC: 29 MMOL/L (ref 21–32)
CREAT SERPL-MCNC: 1.2 MG/DL (ref 0.5–1.05)
EGFRCR SERPLBLD CKD-EPI 2021: 47 ML/MIN/1.73M*2
ERYTHROCYTE [DISTWIDTH] IN BLOOD BY AUTOMATED COUNT: 14.3 % (ref 11.5–14.5)
GLUCOSE SERPL-MCNC: 113 MG/DL (ref 74–99)
HCT VFR BLD AUTO: 32.7 % (ref 36–46)
HGB BLD-MCNC: 10.3 G/DL (ref 12–16)
MAGNESIUM SERPL-MCNC: 1.62 MG/DL (ref 1.6–2.4)
MCH RBC QN AUTO: 31.1 PG (ref 26–34)
MCHC RBC AUTO-ENTMCNC: 31.5 G/DL (ref 32–36)
MCV RBC AUTO: 99 FL (ref 80–100)
NRBC BLD-RTO: 0 /100 WBCS (ref 0–0)
PHOSPHATE SERPL-MCNC: 4.3 MG/DL (ref 2.5–4.9)
PLATELET # BLD AUTO: 306 X10*3/UL (ref 150–450)
POTASSIUM SERPL-SCNC: 3.8 MMOL/L (ref 3.5–5.3)
RBC # BLD AUTO: 3.31 X10*6/UL (ref 4–5.2)
SODIUM SERPL-SCNC: 135 MMOL/L (ref 136–145)
WBC # BLD AUTO: 4.8 X10*3/UL (ref 4.4–11.3)

## 2024-11-03 PROCEDURE — 74177 CT ABD & PELVIS W/CONTRAST: CPT | Performed by: RADIOLOGY

## 2024-11-03 PROCEDURE — 2500000001 HC RX 250 WO HCPCS SELF ADMINISTERED DRUGS (ALT 637 FOR MEDICARE OP): Performed by: INTERNAL MEDICINE

## 2024-11-03 PROCEDURE — 80069 RENAL FUNCTION PANEL: CPT | Performed by: INTERNAL MEDICINE

## 2024-11-03 PROCEDURE — 74177 CT ABD & PELVIS W/CONTRAST: CPT

## 2024-11-03 PROCEDURE — 99239 HOSP IP/OBS DSCHRG MGMT >30: CPT | Performed by: INTERNAL MEDICINE

## 2024-11-03 PROCEDURE — 36415 COLL VENOUS BLD VENIPUNCTURE: CPT | Performed by: INTERNAL MEDICINE

## 2024-11-03 PROCEDURE — 2500000004 HC RX 250 GENERAL PHARMACY W/ HCPCS (ALT 636 FOR OP/ED): Performed by: INTERNAL MEDICINE

## 2024-11-03 PROCEDURE — 2550000001 HC RX 255 CONTRASTS: Performed by: INTERNAL MEDICINE

## 2024-11-03 PROCEDURE — 85027 COMPLETE CBC AUTOMATED: CPT | Performed by: INTERNAL MEDICINE

## 2024-11-03 PROCEDURE — 83735 ASSAY OF MAGNESIUM: CPT | Performed by: INTERNAL MEDICINE

## 2024-11-03 RX ORDER — AMLODIPINE BESYLATE 5 MG/1
5 TABLET ORAL DAILY
Qty: 30 TABLET | Refills: 2 | Status: CANCELLED | OUTPATIENT
Start: 2024-11-04 | End: 2025-02-02

## 2024-11-03 RX ORDER — CARVEDILOL 25 MG/1
25 TABLET ORAL
Qty: 60 TABLET | Refills: 2 | Status: SHIPPED | OUTPATIENT
Start: 2024-11-03 | End: 2025-02-01

## 2024-11-03 RX ORDER — AMLODIPINE BESYLATE 5 MG/1
5 TABLET ORAL DAILY
Qty: 30 TABLET | Refills: 2 | Status: SHIPPED | OUTPATIENT
Start: 2024-11-04 | End: 2025-02-02

## 2024-11-03 RX ORDER — CARVEDILOL 25 MG/1
25 TABLET ORAL
Qty: 60 TABLET | Refills: 2 | Status: CANCELLED | OUTPATIENT
Start: 2024-11-03 | End: 2025-02-01

## 2024-11-03 RX ORDER — CEPHALEXIN 500 MG/1
500 CAPSULE ORAL 2 TIMES DAILY
Qty: 10 CAPSULE | Refills: 0 | Status: SHIPPED | OUTPATIENT
Start: 2024-11-03 | End: 2024-11-08

## 2024-11-03 ASSESSMENT — PAIN SCALES - GENERAL
PAINLEVEL_OUTOF10: 7
PAINLEVEL_OUTOF10: 8
PAINLEVEL_OUTOF10: 0 - NO PAIN
PAINLEVEL_OUTOF10: 3
PAINLEVEL_OUTOF10: 7

## 2024-11-03 ASSESSMENT — COGNITIVE AND FUNCTIONAL STATUS - GENERAL
DAILY ACTIVITIY SCORE: 18
WALKING IN HOSPITAL ROOM: A LITTLE
CLIMB 3 TO 5 STEPS WITH RAILING: A LITTLE
DRESSING REGULAR LOWER BODY CLOTHING: A LITTLE
PERSONAL GROOMING: A LITTLE
TURNING FROM BACK TO SIDE WHILE IN FLAT BAD: A LITTLE
EATING MEALS: A LITTLE
STANDING UP FROM CHAIR USING ARMS: A LITTLE
DRESSING REGULAR UPPER BODY CLOTHING: A LITTLE
MOVING FROM LYING ON BACK TO SITTING ON SIDE OF FLAT BED WITH BEDRAILS: A LITTLE
TOILETING: A LITTLE
MOBILITY SCORE: 18
HELP NEEDED FOR BATHING: A LITTLE
MOVING TO AND FROM BED TO CHAIR: A LITTLE

## 2024-11-03 ASSESSMENT — PAIN DESCRIPTION - ORIENTATION
ORIENTATION: LEFT
ORIENTATION: LEFT

## 2024-11-03 ASSESSMENT — PAIN - FUNCTIONAL ASSESSMENT
PAIN_FUNCTIONAL_ASSESSMENT: 0-10

## 2024-11-03 ASSESSMENT — PAIN DESCRIPTION - LOCATION
LOCATION: HIP
LOCATION: HIP

## 2024-11-03 NOTE — CARE PLAN
The patient's goals for the shift include  Rest     The clinical goals for the shift include Patients pain will be controlled throughout the shift    Over the shift, the patient did not make progress toward the following goals. Barriers to progression include surgical pain. Recommendations to address these barriers include medications and comfort  Problem: Pain - Adult  Goal: Verbalizes/displays adequate comfort level or baseline comfort level  Outcome: Progressing     Problem: Safety - Adult  Goal: Free from fall injury  Outcome: Progressing     Problem: Discharge Planning  Goal: Discharge to home or other facility with appropriate resources  Outcome: Progressing   .

## 2024-11-03 NOTE — DISCHARGE SUMMARY
Discharge Diagnosis  Hypomagnesemia    Issues Requiring Follow-Up  MRI Chest for mediastinal cyst.  Blood pressure monitoring for any medications readjustment.     Discharge Meds     Medication List      START taking these medications     amLODIPine 5 mg tablet; Commonly known as: Norvasc; Take 1 tablet (5 mg)   by mouth once daily.; Start taking on: November 4, 2024   carvedilol 25 mg tablet; Commonly known as: Coreg; Take 1 tablet (25 mg)   by mouth 2 times daily (morning and late afternoon).   cephalexin 500 mg capsule; Commonly known as: Keflex; Take 1 capsule   (500 mg) by mouth 2 times a day for 5 days.     CHANGE how you take these medications     hydroCHLOROthiazide 25 mg tablet; Commonly known as: HYDRODiuril; TAKE 2   TABLETS(50 MG) BY MOUTH EVERY DAY; What changed: See the new instructions.     CONTINUE taking these medications     acetaminophen 500 mg tablet; Commonly known as: Tylenol   albuterol 90 mcg/actuation inhaler   aspirin 81 mg EC tablet   atorvastatin 40 mg tablet; Commonly known as: Lipitor; Take 1 tablet (40   mg) by mouth once daily.   cyclobenzaprine 5 mg tablet; Commonly known as: Flexeril; Take 1-2   tablets (5-10 mg) by mouth once daily at bedtime.   docusate sodium 100 mg capsule; Commonly known as: Colace   famotidine 20 mg tablet; Commonly known as: Pepcid; TAKE ONE TABLET BY   MOUTH TWO TIMES A DAY   folic acid 1 mg tablet; Commonly known as: Folvite   gabapentin 600 mg tablet; Commonly known as: Neurontin   hydroxychloroquine 200 mg tablet; Commonly known as: Plaquenil   lansoprazole 30 mg DR capsule; Commonly known as: Prevacid; Take 1   capsule (30 mg) by mouth once daily. Do not crush or chew.   losartan 100 mg tablet; Commonly known as: Cozaar; Take 1 tablet (100   mg) by mouth once daily.   naloxone 4 mg/0.1 mL nasal spray; Commonly known as: Narcan; Administer   1 spray (4 mg) into affected nostril(s) if needed for opioid reversal. May   repeat every 2-3 minutes if needed,  alternating nostrils, until medical   assistance becomes available.   nitroglycerin 0.4 mg SL tablet; Commonly known as: Nitrostat   ondansetron 4 mg tablet; Commonly known as: Zofran     STOP taking these medications     oxyCODONE 5 mg immediate release tablet; Commonly known as: Roxicodone     ASK your doctor about these medications     benzonatate 100 mg capsule; Commonly known as: Tessalon       Test Results Pending At Discharge  Pending Labs       Order Current Status    Extra Urine Gray Tube Collected (10/30/24 2010)    Urinalysis with Reflex Culture and Microscopic In process            Hospital Course   Anita Calabrese is a 74 y.o. female with a PMHx of HTN, HLD, CAD and GERD who presented to [facility] on  with a chief complaint of chest fluttering. 6 days ago left [facility] at [facility]. Physical therapist checked vital signs today, blood pressure in 190s/100s.  She stated she was not feeling well since yesterday.  Blood pressure was extremely high today.  When asked about what she means by not feeling well, she stated that she was feeling weak and fatigued.  She was having shortness of breath but this has been chronic.  She stated having increased frequency of urination with suprapubic discomfort and dark urine, but she was also feeling that she has not drinking enough water.  Of note she had 1 episode of chest pain on Saturday, when she became sweaty and diaphoretic, the pain lasted for few seconds across her chest and disappeared with nitroglycerin.  Patient was also having fluttering but she stated that it was not much and also happens when she became worried about her heart.  She was having headache on and off pressure in the last 2 days. She denies any change in vision, difficulty swallowing, change in hearing, fever/chills, chest pain,  cough, abdominal pain, weight change, change in bowel habits,  joint pain/swelling or mild left hip s/p surgery, weakness in any of the extremities or swelling, insomnia  or any symptoms of depression.     Patient was admitted for hypertensive emergency as well as chest fluttering. Her blood pressure was initially hard to control. Amlodipine 5 mg added, as well as Coreg 25 mg twice daily. Cardiology was consulted for clearance and agreed with amlodipine and coreg. Review of previous test was all reassuring with current medicine. CT chest was obtained for shortness of breath, and it showed mediastinal cyst, and right kidney lesion, as well as bibasilar atelectasis, no PE was detected. CT abdmen and pelvis with contrast was done for better characterization of the renal mass, and the results was bilateral simple renal cysts. Patient was also complaining of mild tenderness around her incision, she was prescribed Keflex on discharge, and she will follow up with her Orthopedic team on 11/6/2024. Will order outpatient PFT for her SOB.  Pt is stable and medically optimized for discharge and was advised to follow up with PCP and continue home meds. Discussed diagnosis and treatment in detail and answered all questions. Patient expressed understanding and agreed with plan.           Pertinent Physical Exam At Time of Discharge  Physical Exam  Constitutional:       Appearance: Normal appearance. She is obese.   HENT:      Head: Normocephalic and atraumatic.      Right Ear: Tympanic membrane normal.      Left Ear: Tympanic membrane normal.      Nose: Nose normal.      Mouth/Throat:      Mouth: Mucous membranes are moist.      Pharynx: Oropharynx is clear.   Eyes:      General: No scleral icterus.        Right eye: No discharge.         Left eye: No discharge.      Extraocular Movements: Extraocular movements intact.      Conjunctiva/sclera: Conjunctivae normal.      Pupils: Pupils are equal, round, and reactive to light.   Neck:      Vascular: No carotid bruit.   Cardiovascular:      Rate and Rhythm: Normal rate and regular rhythm.      Pulses: Normal pulses.      Heart sounds: No murmur  heard.     No friction rub. No gallop.   Abdominal:      General: Bowel sounds are normal. There is no distension.      Palpations: Abdomen is soft. There is no mass.      Tenderness: There is no abdominal tenderness. There is no right CVA tenderness, left CVA tenderness, guarding or rebound.      Hernia: No hernia is present.   Musculoskeletal:         General: No swelling, tenderness, deformity or signs of injury. Normal range of motion.      Cervical back: Normal range of motion and neck supple. No rigidity or tenderness.      Right lower leg: No edema.      Left lower leg: No edema.      Comments: Incision of the left hip arthroplasty   Lymphadenopathy:      Cervical: No cervical adenopathy.   Skin:     General: Skin is warm.      Coloration: Skin is not jaundiced or pale.      Findings: No bruising, erythema, lesion or rash.   Neurological:      General: No focal deficit present.      Mental Status: She is alert and oriented to person, place, and time. Mental status is at baseline.   Psychiatric:         Mood and Affect: Mood normal.         Behavior: Behavior normal.         Thought Content: Thought content normal.         Judgment: Judgment normal.         Outpatient Follow-Up  Future Appointments   Date Time Provider Department Center   7/9/2025  1:00 PM Jose Angel Ragland DO [id number] [place]   10/17/2025 12:30 PM Tracy M Schwab, APRN-CNP [id number] [place]     I spent > 30 minutes in discharge planning and coordination.     Veronika Maravilla DO   yes

## 2024-11-03 NOTE — PROGRESS NOTES
Anita Calabrese is a 74 y.o. female on day 1 of admission presenting with Hypomagnesemia.      Subjective   Patient seen and examined at bedside, she was having some pain from her surgery, but she was feeling better. She was about to be discharged but she stated having shortness of breath and chest fluttering. Denies any f/c, n/v, new onset headaches, vision changes, abdominal pain, changes in BM, or urinary changes. CT PE was done and incidental finding of renal mass and mediastinum cyst was found with mild atelectasis of the lung.         Objective     Last Recorded Vitals  /66 (BP Location: Left arm, Patient Position: Sitting)   Pulse 89   Temp 35.7 °C (96.3 °F) (Temporal)   Resp 18   Wt 97.5 kg (215 lb)   SpO2 93%   Intake/Output last 3 Shifts:    Intake/Output Summary (Last 24 hours) at 11/2/2024 2104  Last data filed at 11/2/2024 0600  Gross per 24 hour   Intake 480 ml   Output 850 ml   Net -370 ml       Admission Weight  Weight: 97.5 kg (215 lb) (10/30/24 1216)    Daily Weight  10/30/24 : 97.5 kg (215 lb)    Image Results  CT angio chest for pulmonary embolism  Narrative: Interpreted By:  Marty Luciano,   STUDY:  CT ANGIO CHEST FOR PULMONARY EMBOLISM; 11/2/2024 4:58 pm      INDICATION:  Signs/Symptoms:Shortness of breath with chest pain.      COMPARISON:  05/31/2022      ACCESSION NUMBER(S):  [id number]      ORDERING CLINICIAN:  EVELYNE PEREZ      TECHNIQUE:  75 ml of non-ionic iodinated contrast was injected intravenously.      CT angiography (non-coronary) of the chest was performed with  Intravenous contrast material along with 3D (maximum intensity  projection - MIP) image post processing.      One or more of the following dose reduction techniques were used:  Automated exposure control Adjustment of the mA and/or kV according  to patient size, and/or use of iterative reconstruction technique.      FINDINGS:  There is no evidence for aortic dissection or pericardial effusion.  There is  no evidence for pulmonary embolism. No infiltrates or  effusions are identified. Right middle lobe and lingular atelectasis  is present. There is also patchy atelectasis within both lower lobes,  more marked on the right. Cardiomegaly is present.      There is a 3.1 cm fluid collection anterior to the isabel and within  the right paratracheal distribution possibly representing a  mediastinal cyst. Correlation with nonemergency MRI of the chest  would be useful for further assessment.      Chest Wall: No chest wall abnormalities are identified.      Upper Abdominal Findings: There are 2 accessory splenule in the left  upper quadrant. There is a somewhat ill-defined area of diminished  attenuation upper pole right kidney measuring up to 3.2 cm in  diameter. An upper pole right renal mass or infection is not excluded  and further assessment by correlation with abdominal CT with  intravenous contrast is suggested for further assessment.      Skeletal System: No acute fractures or destructive lesions are  identified. There is a reverse shoulder arthroplasty on the left.      Impression: 1. There is a somewhat ill-defined area of diminished attenuation  upper pole right kidney measuring up to 3.2 cm in diameter. An upper  pole right renal mass or infection is not excluded and further  assessment by correlation with abdominal CT with intravenous contrast  is suggested for further assessment.  2. Findings suspicious for superior mediastinal cyst within the right  paratracheal distribution. Nonemergency MRI chest is suggested for  further assessment when the patient can tolerate.  3. Bibasilar atelectasis.      MACRO:  none      Signed by: Marty Luciano 11/2/2024 6:42 PM  Dictation workstation:   [id number]      Physical Exam  Constitutional:       Appearance: Normal appearance. She is obese.   HENT:      Head: Normocephalic and atraumatic.      Right Ear: Tympanic membrane normal.      Left Ear: Tympanic membrane normal.       Nose: Nose normal.      Mouth/Throat:      Mouth: Mucous membranes are moist.      Pharynx: Oropharynx is clear.   Eyes:      General: No scleral icterus.        Right eye: No discharge.         Left eye: No discharge.      Extraocular Movements: Extraocular movements intact.      Conjunctiva/sclera: Conjunctivae normal.      Pupils: Pupils are equal, round, and reactive to light.   Neck:      Vascular: No carotid bruit.   Cardiovascular:      Rate and Rhythm: Normal rate and regular rhythm.      Pulses: Normal pulses.      Heart sounds: No murmur heard.     No friction rub. No gallop.   Pulmonary:      Effort: Pulmonary effort is normal.      Breath sounds: Normal breath sounds.   Abdominal:      General: Bowel sounds are normal. There is no distension.      Palpations: Abdomen is soft. There is no mass.      Tenderness: There is no abdominal tenderness. There is no right CVA tenderness, left CVA tenderness, guarding or rebound.      Hernia: No hernia is present.   Musculoskeletal:         General: No swelling, tenderness, deformity or signs of injury.      Cervical back: Normal range of motion and neck supple. No rigidity or tenderness.      Right lower leg: No edema.      Left lower leg: No edema.      Comments: Left hip wrapped    Lymphadenopathy:      Cervical: No cervical adenopathy.   Skin:     General: Skin is warm.      Coloration: Skin is not jaundiced or pale.      Findings: No bruising, erythema, lesion or rash.   Neurological:      General: No focal deficit present.      Mental Status: She is alert and oriented to person, place, and time. Mental status is at baseline.   Psychiatric:         Mood and Affect: Mood normal.         Behavior: Behavior normal.         Thought Content: Thought content normal.         Judgment: Judgment normal.         Relevant Results    Scheduled medications  amLODIPine, 5 mg, oral, Daily  aspirin, 81 mg, oral, BID  atorvastatin, 40 mg, oral, Daily  carvedilol, 25 mg,  oral, BID  cyclobenzaprine, 10 mg, oral, Nightly  docusate sodium, 100 mg, oral, q12h ANDREW  enoxaparin, 40 mg, subcutaneous, q24h  famotidine, 20 mg, oral, BID  folic acid, 1 mg, oral, Daily  hydroCHLOROthiazide, 50 mg, oral, Daily  losartan, 100 mg, oral, Daily      Continuous medications     PRN medications  PRN medications: acetaminophen, albuterol, HYDROmorphone, labetaloL, nitroglycerin, oxyCODONE-acetaminophen, polyethylene glycol                 Assessment/Plan      Assessment & Plan  Hypomagnesemia    Anita Calabrese is a 74 y.o. female admitted for fatigue, high blood pressure and possible fluttering.     Acute Medical Issues   # Hypertensive emergency/urgency (Resolved)  -Patient's blood pressure was severely elevated in the ED, with maximum reaching systolic above 200 once  -Patient responded well to labetalol 20 mg IV.  -Resume home medications, hydrochlorothiazide 50 mg daily, losartan 100 mg daily, better controlled after  adding  amlodipine 5 mg, but not at goal, carvedilol 25 mg twice daily, with better control of BP.  - Renal duplex ordered to rule out RA stenosis; negative.  - Cardiology consulted; appreciate recs           #Concern for fluttering/chest pain:  -Patient had 1 episode of chest pain, on Saturday that subsided spontaneously.  -Less likely cardiac in nature, EKG with no ischemic changes.  -cardiology cleared the patient for chest pain        #Elevated troponin (Resolved):  -Mildly elevated troponin, likely secondary to demand ischemia type II MI.  -EKG with no ischemic changes according to the ED note.  - troponin down trended      #Incidental finding of renal mass:  - CT PE was done for SOB, patient was found to have a renal mass.  - Will obtain CT abdomen, with contrast for better characterization.    #Incidental finding of Mediastinal cyst:  - MRI as outpatient.      #  UTI ruled out:  -Patient has suprapubic pain, with increased frequency.  -UA with reflex microscopy and culture;  negative for infections.  -Discontinue ceftriaxone.      #Hypomagnesemia:  - Repleted in the ED  -Improved, but given more to increase level above 2.       Chronic Medical Issues   # CAD/HLD:  -Continue ASA 81 and atorvastatin 40 mg     # Possible COPD:  -Continue albuterol inhaler as needed        #S/p surgery for total hip replacement:  -Pain management and continue to monitor, Flexeril 10 mg nightly.  -PT/OT while in-house     F: PO intake & IVF PRN  E: Replete as needed  N: Cardiac  GI ppx: Famotidine 20 mg daily   DVT ppx: Lovenox subcutaneous  Antibiotics: Ceftriaxone  Oxygenation: RA     Code Status: Full Code   Emergency Contact: Extended Emergency Contact Information  Primary Emergency Contact: TAJ UNGER  Mobile Phone: [phone]  Relation: Daughter  Preferred language: English   needed? No  Secondary Emergency Contact: RAFAEL UNGER  Mobile Phone: [phone]  Relation: Daughter  Preferred language: English   needed? No      Disposition: 74 y.o.female admitted for hypertensive emergency/urgency, patient is improving, continue to monitor as above, possible discharge tomorrow.           Veronika Maravilla  Internal Medicine, Hospitalist   [facility]  [place]

## 2024-11-03 NOTE — DISCHARGE INSTRUCTIONS
-Follow up with your PCP.  - Hypertension is now controlled after adding Amlodipine 5 mg once daily and Coreg 25 mg twice daily.  - Continue to monitor your blood pressure as it may become low when surgical pain is better controlled.  - MRI for the chest is ordered, to be done non emergently in 2 weeks or after as outpatient.  - Keflex 500 mg twice daily for possible cellulitis around the incision, follow up with your orthopedic surgeon on the previously scheduled appointment on 11/6/2024.

## 2024-11-05 ENCOUNTER — PATIENT OUTREACH (OUTPATIENT)
Dept: PRIMARY CARE | Facility: CLINIC | Age: 74
End: 2024-11-05
Payer: MEDICARE

## 2024-11-05 NOTE — PROGRESS NOTES
Discharge Facility: [facility]  Discharge Diagnosis: Hypomagnesemia  Admission Date: 10/30/2024  Discharge Date: 11/3/2024    PCP Appointment Date:  -Unable to schedule d/t no contact; task sent to office    Hospital Encounter and Summary Linked: Yes    Unable to reach patient x2 attempts, voicemail left with call back number.

## 2024-11-06 ENCOUNTER — APPOINTMENT (OUTPATIENT)
Dept: CARDIOLOGY | Facility: HOSPITAL | Age: 74
End: 2024-11-06
Payer: MEDICARE

## 2024-11-06 ENCOUNTER — HOSPITAL ENCOUNTER (INPATIENT)
Facility: HOSPITAL | Age: 74
End: 2024-11-06
Attending: STUDENT IN AN ORGANIZED HEALTH CARE EDUCATION/TRAINING PROGRAM | Admitting: STUDENT IN AN ORGANIZED HEALTH CARE EDUCATION/TRAINING PROGRAM
Payer: MEDICARE

## 2024-11-06 ENCOUNTER — APPOINTMENT (OUTPATIENT)
Dept: RADIOLOGY | Facility: HOSPITAL | Age: 74
End: 2024-11-06
Payer: MEDICARE

## 2024-11-06 DIAGNOSIS — R09.02 HYPOXIA: Primary | ICD-10-CM

## 2024-11-06 DIAGNOSIS — M25.561 PAIN IN BOTH KNEES, UNSPECIFIED CHRONICITY: ICD-10-CM

## 2024-11-06 DIAGNOSIS — I10 BENIGN ESSENTIAL HYPERTENSION: ICD-10-CM

## 2024-11-06 DIAGNOSIS — M25.562 PAIN IN BOTH KNEES, UNSPECIFIED CHRONICITY: ICD-10-CM

## 2024-11-06 DIAGNOSIS — G47.30 SLEEP APNEA, UNSPECIFIED TYPE: ICD-10-CM

## 2024-11-06 DIAGNOSIS — R06.02 SHORTNESS OF BREATH: ICD-10-CM

## 2024-11-06 DIAGNOSIS — I20.0 UNSTABLE ANGINA (MULTI): ICD-10-CM

## 2024-11-06 DIAGNOSIS — R06.09 DOE (DYSPNEA ON EXERTION): ICD-10-CM

## 2024-11-06 LAB
ALBUMIN SERPL BCP-MCNC: 3.7 G/DL (ref 3.4–5)
ALP SERPL-CCNC: 64 U/L (ref 33–136)
ALT SERPL W P-5'-P-CCNC: 12 U/L (ref 7–45)
ANION GAP SERPL CALC-SCNC: 14 MMOL/L (ref 10–20)
AST SERPL W P-5'-P-CCNC: 14 U/L (ref 9–39)
BASOPHILS # BLD AUTO: 0.04 X10*3/UL (ref 0–0.1)
BASOPHILS NFR BLD AUTO: 0.4 %
BILIRUB SERPL-MCNC: 0.6 MG/DL (ref 0–1.2)
BUN SERPL-MCNC: 25 MG/DL (ref 6–23)
CALCIUM SERPL-MCNC: 9.7 MG/DL (ref 8.6–10.3)
CARDIAC TROPONIN I PNL SERPL HS: 8 NG/L (ref 0–13)
CARDIAC TROPONIN I PNL SERPL HS: 9 NG/L (ref 0–13)
CHLORIDE SERPL-SCNC: 98 MMOL/L (ref 98–107)
CO2 SERPL-SCNC: 26 MMOL/L (ref 21–32)
CREAT SERPL-MCNC: 1.79 MG/DL (ref 0.5–1.05)
EGFRCR SERPLBLD CKD-EPI 2021: 29 ML/MIN/1.73M*2
EOSINOPHIL # BLD AUTO: 0.26 X10*3/UL (ref 0–0.4)
EOSINOPHIL NFR BLD AUTO: 2.9 %
ERYTHROCYTE [DISTWIDTH] IN BLOOD BY AUTOMATED COUNT: 14 % (ref 11.5–14.5)
FLUAV RNA RESP QL NAA+PROBE: NOT DETECTED
FLUBV RNA RESP QL NAA+PROBE: NOT DETECTED
GLUCOSE SERPL-MCNC: 144 MG/DL (ref 74–99)
HCT VFR BLD AUTO: 35.7 % (ref 36–46)
HGB BLD-MCNC: 11 G/DL (ref 12–16)
IMM GRANULOCYTES # BLD AUTO: 0.13 X10*3/UL (ref 0–0.5)
IMM GRANULOCYTES NFR BLD AUTO: 1.4 % (ref 0–0.9)
LACTATE SERPL-SCNC: 0.9 MMOL/L (ref 0.4–2)
LIPASE SERPL-CCNC: 15 U/L (ref 9–82)
LYMPHOCYTES # BLD AUTO: 1.14 X10*3/UL (ref 0.8–3)
LYMPHOCYTES NFR BLD AUTO: 12.7 %
MAGNESIUM SERPL-MCNC: 1.55 MG/DL (ref 1.6–2.4)
MCH RBC QN AUTO: 30.2 PG (ref 26–34)
MCHC RBC AUTO-ENTMCNC: 30.8 G/DL (ref 32–36)
MCV RBC AUTO: 98 FL (ref 80–100)
MONOCYTES # BLD AUTO: 0.47 X10*3/UL (ref 0.05–0.8)
MONOCYTES NFR BLD AUTO: 5.2 %
NEUTROPHILS # BLD AUTO: 6.97 X10*3/UL (ref 1.6–5.5)
NEUTROPHILS NFR BLD AUTO: 77.4 %
NRBC BLD-RTO: 0 /100 WBCS (ref 0–0)
PLATELET # BLD AUTO: 396 X10*3/UL (ref 150–450)
POTASSIUM SERPL-SCNC: 3.7 MMOL/L (ref 3.5–5.3)
PROT SERPL-MCNC: 7.6 G/DL (ref 6.4–8.2)
RBC # BLD AUTO: 3.64 X10*6/UL (ref 4–5.2)
SARS-COV-2 RNA RESP QL NAA+PROBE: NOT DETECTED
SODIUM SERPL-SCNC: 134 MMOL/L (ref 136–145)
WBC # BLD AUTO: 9 X10*3/UL (ref 4.4–11.3)

## 2024-11-06 PROCEDURE — 96365 THER/PROPH/DIAG IV INF INIT: CPT | Mod: 59

## 2024-11-06 PROCEDURE — 83690 ASSAY OF LIPASE: CPT | Performed by: STUDENT IN AN ORGANIZED HEALTH CARE EDUCATION/TRAINING PROGRAM

## 2024-11-06 PROCEDURE — 96372 THER/PROPH/DIAG INJ SC/IM: CPT | Performed by: STUDENT IN AN ORGANIZED HEALTH CARE EDUCATION/TRAINING PROGRAM

## 2024-11-06 PROCEDURE — 80053 COMPREHEN METABOLIC PANEL: CPT | Performed by: STUDENT IN AN ORGANIZED HEALTH CARE EDUCATION/TRAINING PROGRAM

## 2024-11-06 PROCEDURE — 2500000004 HC RX 250 GENERAL PHARMACY W/ HCPCS (ALT 636 FOR OP/ED): Performed by: STUDENT IN AN ORGANIZED HEALTH CARE EDUCATION/TRAINING PROGRAM

## 2024-11-06 PROCEDURE — 87636 SARSCOV2 & INF A&B AMP PRB: CPT | Performed by: STUDENT IN AN ORGANIZED HEALTH CARE EDUCATION/TRAINING PROGRAM

## 2024-11-06 PROCEDURE — 83605 ASSAY OF LACTIC ACID: CPT | Performed by: STUDENT IN AN ORGANIZED HEALTH CARE EDUCATION/TRAINING PROGRAM

## 2024-11-06 PROCEDURE — G0378 HOSPITAL OBSERVATION PER HR: HCPCS

## 2024-11-06 PROCEDURE — 93005 ELECTROCARDIOGRAM TRACING: CPT

## 2024-11-06 PROCEDURE — 85025 COMPLETE CBC W/AUTO DIFF WBC: CPT | Performed by: STUDENT IN AN ORGANIZED HEALTH CARE EDUCATION/TRAINING PROGRAM

## 2024-11-06 PROCEDURE — 83735 ASSAY OF MAGNESIUM: CPT | Performed by: STUDENT IN AN ORGANIZED HEALTH CARE EDUCATION/TRAINING PROGRAM

## 2024-11-06 PROCEDURE — 2500000005 HC RX 250 GENERAL PHARMACY W/O HCPCS: Performed by: STUDENT IN AN ORGANIZED HEALTH CARE EDUCATION/TRAINING PROGRAM

## 2024-11-06 PROCEDURE — 36415 COLL VENOUS BLD VENIPUNCTURE: CPT | Performed by: STUDENT IN AN ORGANIZED HEALTH CARE EDUCATION/TRAINING PROGRAM

## 2024-11-06 PROCEDURE — 84484 ASSAY OF TROPONIN QUANT: CPT | Performed by: STUDENT IN AN ORGANIZED HEALTH CARE EDUCATION/TRAINING PROGRAM

## 2024-11-06 PROCEDURE — 74177 CT ABD & PELVIS W/CONTRAST: CPT

## 2024-11-06 PROCEDURE — 94640 AIRWAY INHALATION TREATMENT: CPT

## 2024-11-06 PROCEDURE — 71275 CT ANGIOGRAPHY CHEST: CPT

## 2024-11-06 PROCEDURE — 2500000002 HC RX 250 W HCPCS SELF ADMINISTERED DRUGS (ALT 637 FOR MEDICARE OP, ALT 636 FOR OP/ED): Performed by: STUDENT IN AN ORGANIZED HEALTH CARE EDUCATION/TRAINING PROGRAM

## 2024-11-06 PROCEDURE — 96366 THER/PROPH/DIAG IV INF ADDON: CPT

## 2024-11-06 PROCEDURE — 2500000001 HC RX 250 WO HCPCS SELF ADMINISTERED DRUGS (ALT 637 FOR MEDICARE OP): Performed by: STUDENT IN AN ORGANIZED HEALTH CARE EDUCATION/TRAINING PROGRAM

## 2024-11-06 PROCEDURE — 99285 EMERGENCY DEPT VISIT HI MDM: CPT

## 2024-11-06 PROCEDURE — 93010 ELECTROCARDIOGRAM REPORT: CPT | Performed by: STUDENT IN AN ORGANIZED HEALTH CARE EDUCATION/TRAINING PROGRAM

## 2024-11-06 PROCEDURE — 2550000001 HC RX 255 CONTRASTS: Performed by: STUDENT IN AN ORGANIZED HEALTH CARE EDUCATION/TRAINING PROGRAM

## 2024-11-06 PROCEDURE — 96375 TX/PRO/DX INJ NEW DRUG ADDON: CPT

## 2024-11-06 PROCEDURE — 99223 1ST HOSP IP/OBS HIGH 75: CPT | Performed by: STUDENT IN AN ORGANIZED HEALTH CARE EDUCATION/TRAINING PROGRAM

## 2024-11-06 RX ORDER — ASPIRIN 81 MG/1
81 TABLET ORAL 2 TIMES DAILY
Status: DISCONTINUED | OUTPATIENT
Start: 2024-11-06 | End: 2024-11-07

## 2024-11-06 RX ORDER — ONDANSETRON HYDROCHLORIDE 2 MG/ML
4 INJECTION, SOLUTION INTRAVENOUS ONCE
Status: COMPLETED | OUTPATIENT
Start: 2024-11-06 | End: 2024-11-06

## 2024-11-06 RX ORDER — IPRATROPIUM BROMIDE AND ALBUTEROL SULFATE 2.5; .5 MG/3ML; MG/3ML
3 SOLUTION RESPIRATORY (INHALATION) EVERY 2 HOUR PRN
Status: ACTIVE | OUTPATIENT
Start: 2024-11-06

## 2024-11-06 RX ORDER — ONDANSETRON HYDROCHLORIDE 2 MG/ML
4 INJECTION, SOLUTION INTRAVENOUS ONCE AS NEEDED
Status: DISCONTINUED | OUTPATIENT
Start: 2024-11-06 | End: 2024-11-06

## 2024-11-06 RX ORDER — AMLODIPINE BESYLATE 5 MG/1
5 TABLET ORAL DAILY
Status: DISCONTINUED | OUTPATIENT
Start: 2024-11-06 | End: 2024-11-09

## 2024-11-06 RX ORDER — FOLIC ACID 1 MG/1
1 TABLET ORAL DAILY
Status: DISPENSED | OUTPATIENT
Start: 2024-11-06

## 2024-11-06 RX ORDER — ACETAMINOPHEN 325 MG/1
650 TABLET ORAL EVERY 4 HOURS PRN
Status: DISPENSED | OUTPATIENT
Start: 2024-11-06

## 2024-11-06 RX ORDER — HEPARIN SODIUM 5000 [USP'U]/ML
5000 INJECTION, SOLUTION INTRAVENOUS; SUBCUTANEOUS EVERY 8 HOURS
Status: DISPENSED | OUTPATIENT
Start: 2024-11-06

## 2024-11-06 RX ORDER — ONDANSETRON HYDROCHLORIDE 2 MG/ML
4 INJECTION, SOLUTION INTRAVENOUS EVERY 6 HOURS PRN
Status: ACTIVE | OUTPATIENT
Start: 2024-11-06

## 2024-11-06 RX ORDER — GABAPENTIN 400 MG/1
400 CAPSULE ORAL 2 TIMES DAILY
Status: DISCONTINUED | OUTPATIENT
Start: 2024-11-06 | End: 2024-11-10

## 2024-11-06 RX ORDER — LIDOCAINE HYDROCHLORIDE 10 MG/ML
0.1 INJECTION, SOLUTION EPIDURAL; INFILTRATION; INTRACAUDAL; PERINEURAL ONCE
Status: DISCONTINUED | OUTPATIENT
Start: 2024-11-06 | End: 2024-11-06

## 2024-11-06 RX ORDER — OXYCODONE HYDROCHLORIDE 5 MG/1
5 TABLET ORAL EVERY 4 HOURS PRN
Status: DISCONTINUED | OUTPATIENT
Start: 2024-11-06 | End: 2024-11-06

## 2024-11-06 RX ORDER — CYCLOBENZAPRINE HCL 10 MG
5 TABLET ORAL NIGHTLY
Status: DISCONTINUED | OUTPATIENT
Start: 2024-11-06 | End: 2024-11-07

## 2024-11-06 RX ORDER — GABAPENTIN 300 MG/1
600 CAPSULE ORAL 2 TIMES DAILY
Status: DISCONTINUED | OUTPATIENT
Start: 2024-11-06 | End: 2024-11-06

## 2024-11-06 RX ORDER — ALBUTEROL SULFATE 0.83 MG/ML
2.5 SOLUTION RESPIRATORY (INHALATION) ONCE AS NEEDED
Status: DISCONTINUED | OUTPATIENT
Start: 2024-11-06 | End: 2024-11-06

## 2024-11-06 RX ORDER — IPRATROPIUM BROMIDE AND ALBUTEROL SULFATE 2.5; .5 MG/3ML; MG/3ML
3 SOLUTION RESPIRATORY (INHALATION)
Status: DISCONTINUED | OUTPATIENT
Start: 2024-11-06 | End: 2024-11-09

## 2024-11-06 RX ORDER — SODIUM CHLORIDE 9 MG/ML
100 INJECTION, SOLUTION INTRAVENOUS CONTINUOUS
Status: ACTIVE | OUTPATIENT
Start: 2024-11-06 | End: 2024-11-07

## 2024-11-06 RX ORDER — CEPHALEXIN 500 MG/1
500 CAPSULE ORAL 2 TIMES DAILY
Status: DISCONTINUED | OUTPATIENT
Start: 2024-11-06 | End: 2024-11-09

## 2024-11-06 RX ORDER — CARVEDILOL 25 MG/1
25 TABLET ORAL
Status: DISCONTINUED | OUTPATIENT
Start: 2024-11-06 | End: 2024-11-08

## 2024-11-06 RX ORDER — FAMOTIDINE 20 MG/1
20 TABLET, FILM COATED ORAL 2 TIMES DAILY
Status: DISPENSED | OUTPATIENT
Start: 2024-11-06

## 2024-11-06 RX ORDER — SODIUM CHLORIDE, SODIUM LACTATE, POTASSIUM CHLORIDE, CALCIUM CHLORIDE 600; 310; 30; 20 MG/100ML; MG/100ML; MG/100ML; MG/100ML
100 INJECTION, SOLUTION INTRAVENOUS CONTINUOUS
Status: DISCONTINUED | OUTPATIENT
Start: 2024-11-06 | End: 2024-11-06

## 2024-11-06 RX ORDER — PANTOPRAZOLE SODIUM 40 MG/10ML
40 INJECTION, POWDER, LYOPHILIZED, FOR SOLUTION INTRAVENOUS 2 TIMES DAILY
Status: DISPENSED | OUTPATIENT
Start: 2024-11-06

## 2024-11-06 RX ORDER — LABETALOL HYDROCHLORIDE 5 MG/ML
5 INJECTION, SOLUTION INTRAVENOUS ONCE AS NEEDED
Status: DISCONTINUED | OUTPATIENT
Start: 2024-11-06 | End: 2024-11-06

## 2024-11-06 RX ORDER — FENTANYL CITRATE 50 UG/ML
25 INJECTION, SOLUTION INTRAMUSCULAR; INTRAVENOUS EVERY 5 MIN PRN
Status: DISCONTINUED | OUTPATIENT
Start: 2024-11-06 | End: 2024-11-06

## 2024-11-06 RX ORDER — ATORVASTATIN CALCIUM 40 MG/1
40 TABLET, FILM COATED ORAL DAILY
Status: DISPENSED | OUTPATIENT
Start: 2024-11-06

## 2024-11-06 RX ORDER — MAGNESIUM SULFATE HEPTAHYDRATE 40 MG/ML
2 INJECTION, SOLUTION INTRAVENOUS ONCE
Status: COMPLETED | OUTPATIENT
Start: 2024-11-06 | End: 2024-11-06

## 2024-11-06 SDOH — SOCIAL STABILITY: SOCIAL INSECURITY: WITHIN THE LAST YEAR, HAVE YOU BEEN HUMILIATED OR EMOTIONALLY ABUSED IN OTHER WAYS BY YOUR PARTNER OR EX-PARTNER?: NO

## 2024-11-06 SDOH — ECONOMIC STABILITY: FOOD INSECURITY: WITHIN THE PAST 12 MONTHS, THE FOOD YOU BOUGHT JUST DIDN'T LAST AND YOU DIDN'T HAVE MONEY TO GET MORE.: NEVER TRUE

## 2024-11-06 SDOH — ECONOMIC STABILITY: INCOME INSECURITY: IN THE PAST 12 MONTHS HAS THE ELECTRIC, GAS, OIL, OR WATER COMPANY THREATENED TO SHUT OFF SERVICES IN YOUR HOME?: NO

## 2024-11-06 SDOH — SOCIAL STABILITY: SOCIAL INSECURITY: ARE YOU OR HAVE YOU BEEN THREATENED OR ABUSED PHYSICALLY, EMOTIONALLY, OR SEXUALLY BY ANYONE?: NO

## 2024-11-06 SDOH — ECONOMIC STABILITY: FOOD INSECURITY: WITHIN THE PAST 12 MONTHS, YOU WORRIED THAT YOUR FOOD WOULD RUN OUT BEFORE YOU GOT THE MONEY TO BUY MORE.: NEVER TRUE

## 2024-11-06 SDOH — SOCIAL STABILITY: SOCIAL INSECURITY: WITHIN THE LAST YEAR, HAVE YOU BEEN AFRAID OF YOUR PARTNER OR EX-PARTNER?: NO

## 2024-11-06 SDOH — SOCIAL STABILITY: SOCIAL INSECURITY: HAVE YOU HAD ANY THOUGHTS OF HARMING ANYONE ELSE?: NO

## 2024-11-06 SDOH — SOCIAL STABILITY: SOCIAL INSECURITY: DO YOU FEEL ANYONE HAS EXPLOITED OR TAKEN ADVANTAGE OF YOU FINANCIALLY OR OF YOUR PERSONAL PROPERTY?: NO

## 2024-11-06 SDOH — HEALTH STABILITY: PHYSICAL HEALTH: ON AVERAGE, HOW MANY MINUTES DO YOU ENGAGE IN EXERCISE AT THIS LEVEL?: PATIENT DECLINED

## 2024-11-06 SDOH — SOCIAL STABILITY: SOCIAL INSECURITY: DO YOU FEEL UNSAFE GOING BACK TO THE PLACE WHERE YOU ARE LIVING?: NO

## 2024-11-06 SDOH — SOCIAL STABILITY: SOCIAL INSECURITY: ABUSE: ADULT

## 2024-11-06 SDOH — SOCIAL STABILITY: SOCIAL INSECURITY: ARE THERE ANY APPARENT SIGNS OF INJURIES/BEHAVIORS THAT COULD BE RELATED TO ABUSE/NEGLECT?: NO

## 2024-11-06 SDOH — SOCIAL STABILITY: SOCIAL INSECURITY: HAVE YOU HAD THOUGHTS OF HARMING ANYONE ELSE?: NO

## 2024-11-06 SDOH — SOCIAL STABILITY: SOCIAL INSECURITY: DOES ANYONE TRY TO KEEP YOU FROM HAVING/CONTACTING OTHER FRIENDS OR DOING THINGS OUTSIDE YOUR HOME?: NO

## 2024-11-06 SDOH — SOCIAL STABILITY: SOCIAL INSECURITY: HAS ANYONE EVER THREATENED TO HURT YOUR FAMILY OR YOUR PETS?: NO

## 2024-11-06 SDOH — SOCIAL STABILITY: SOCIAL INSECURITY: WERE YOU ABLE TO COMPLETE ALL THE BEHAVIORAL HEALTH SCREENINGS?: YES

## 2024-11-06 ASSESSMENT — COGNITIVE AND FUNCTIONAL STATUS - GENERAL
TURNING FROM BACK TO SIDE WHILE IN FLAT BAD: A LOT
WALKING IN HOSPITAL ROOM: A LOT
TOILETING: A LITTLE
MOVING TO AND FROM BED TO CHAIR: A LOT
MOVING FROM LYING ON BACK TO SITTING ON SIDE OF FLAT BED WITH BEDRAILS: A LOT
DRESSING REGULAR LOWER BODY CLOTHING: A LITTLE
MOBILITY SCORE: 12
PATIENT BASELINE BEDBOUND: NO
MOVING FROM LYING ON BACK TO SITTING ON SIDE OF FLAT BED WITH BEDRAILS: A LITTLE
WALKING IN HOSPITAL ROOM: A LOT
CLIMB 3 TO 5 STEPS WITH RAILING: A LOT
STANDING UP FROM CHAIR USING ARMS: A LOT
HELP NEEDED FOR BATHING: A LITTLE
STANDING UP FROM CHAIR USING ARMS: A LITTLE
DAILY ACTIVITIY SCORE: 19
DRESSING REGULAR UPPER BODY CLOTHING: A LITTLE
DAILY ACTIVITIY SCORE: 19
MOBILITY SCORE: 16
TURNING FROM BACK TO SIDE WHILE IN FLAT BAD: A LITTLE
MOVING TO AND FROM BED TO CHAIR: A LITTLE
TOILETING: A LITTLE
PERSONAL GROOMING: A LITTLE
DRESSING REGULAR LOWER BODY CLOTHING: A LOT
CLIMB 3 TO 5 STEPS WITH RAILING: A LOT
HELP NEEDED FOR BATHING: A LITTLE
DRESSING REGULAR UPPER BODY CLOTHING: A LITTLE

## 2024-11-06 ASSESSMENT — ACTIVITIES OF DAILY LIVING (ADL)
HEARING - RIGHT EAR: FUNCTIONAL
PATIENT'S MEMORY ADEQUATE TO SAFELY COMPLETE DAILY ACTIVITIES?: YES
DRESSING YOURSELF: NEEDS ASSISTANCE
FEEDING YOURSELF: INDEPENDENT
ASSISTIVE_DEVICE: WALKER
BATHING: NEEDS ASSISTANCE
WALKS IN HOME: NEEDS ASSISTANCE
GROOMING: INDEPENDENT
LACK_OF_TRANSPORTATION: NO
HEARING - LEFT EAR: FUNCTIONAL
ADEQUATE_TO_COMPLETE_ADL: YES
JUDGMENT_ADEQUATE_SAFELY_COMPLETE_DAILY_ACTIVITIES: YES
TOILETING: NEEDS ASSISTANCE

## 2024-11-06 ASSESSMENT — LIFESTYLE VARIABLES
EVER HAD A DRINK FIRST THING IN THE MORNING TO STEADY YOUR NERVES TO GET RID OF A HANGOVER: NO
EVER FELT BAD OR GUILTY ABOUT YOUR DRINKING: NO
HOW OFTEN DO YOU HAVE 6 OR MORE DRINKS ON ONE OCCASION: NEVER
HOW OFTEN DO YOU HAVE A DRINK CONTAINING ALCOHOL: NEVER
AUDIT-C TOTAL SCORE: 0
SUBSTANCE_ABUSE_PAST_12_MONTHS: NO
PRESCIPTION_ABUSE_PAST_12_MONTHS: NO
TOTAL SCORE: 0
AUDIT-C TOTAL SCORE: 0
SKIP TO QUESTIONS 9-10: 1
HAVE YOU EVER FELT YOU SHOULD CUT DOWN ON YOUR DRINKING: NO
HOW MANY STANDARD DRINKS CONTAINING ALCOHOL DO YOU HAVE ON A TYPICAL DAY: PATIENT DOES NOT DRINK
HAVE PEOPLE ANNOYED YOU BY CRITICIZING YOUR DRINKING: NO

## 2024-11-06 ASSESSMENT — PAIN DESCRIPTION - ONSET: ONSET: AWAKENED FROM SLEEP

## 2024-11-06 ASSESSMENT — ENCOUNTER SYMPTOMS
VOMITING: 1
SHORTNESS OF BREATH: 1
NAUSEA: 1
ABDOMINAL PAIN: 1

## 2024-11-06 ASSESSMENT — COLUMBIA-SUICIDE SEVERITY RATING SCALE - C-SSRS
2. HAVE YOU ACTUALLY HAD ANY THOUGHTS OF KILLING YOURSELF?: NO
1. IN THE PAST MONTH, HAVE YOU WISHED YOU WERE DEAD OR WISHED YOU COULD GO TO SLEEP AND NOT WAKE UP?: NO
6. HAVE YOU EVER DONE ANYTHING, STARTED TO DO ANYTHING, OR PREPARED TO DO ANYTHING TO END YOUR LIFE?: NO
2. HAVE YOU ACTUALLY HAD ANY THOUGHTS OF KILLING YOURSELF?: NO
1. IN THE PAST MONTH, HAVE YOU WISHED YOU WERE DEAD OR WISHED YOU COULD GO TO SLEEP AND NOT WAKE UP?: NO
6. HAVE YOU EVER DONE ANYTHING, STARTED TO DO ANYTHING, OR PREPARED TO DO ANYTHING TO END YOUR LIFE?: NO

## 2024-11-06 ASSESSMENT — PAIN - FUNCTIONAL ASSESSMENT: PAIN_FUNCTIONAL_ASSESSMENT: 0-10

## 2024-11-06 ASSESSMENT — PAIN DESCRIPTION - DESCRIPTORS: DESCRIPTORS: ACHING

## 2024-11-06 ASSESSMENT — PAIN SCALES - GENERAL
PAINLEVEL_OUTOF10: 3
PAINLEVEL_OUTOF10: 6
PAINLEVEL_OUTOF10: 0 - NO PAIN

## 2024-11-06 ASSESSMENT — PATIENT HEALTH QUESTIONNAIRE - PHQ9
SUM OF ALL RESPONSES TO PHQ9 QUESTIONS 1 & 2: 0
1. LITTLE INTEREST OR PLEASURE IN DOING THINGS: NOT AT ALL
2. FEELING DOWN, DEPRESSED OR HOPELESS: NOT AT ALL

## 2024-11-06 ASSESSMENT — PAIN DESCRIPTION - ORIENTATION: ORIENTATION: MID

## 2024-11-06 ASSESSMENT — PAIN DESCRIPTION - LOCATION: LOCATION: ABDOMEN

## 2024-11-06 ASSESSMENT — PAIN DESCRIPTION - PAIN TYPE: TYPE: ACUTE PAIN

## 2024-11-06 NOTE — H&P
History Of Present Illness  73 yo F with PMHx of CAD, HTN, HLD, TIA, GERD, COPD, and KAVEH not on CPAP, and RA presents with multiple complaints. She states she woke up this morning with n/v and mid epigastric pain which was her primary reason for coming to the ED. She did have an episode of diarrhea yesterday which  has since resolved. In addition, she states she has had intermittent mid sternal non radiating chest pressure at rest over the past two days. She has chronic sob and in the ED was found to be hypoxic requiring 2 L NC at rest. She was just discharged from [facility] on 11/3 after being admitted with atypical CP, palpitations, and uncontrolled HTN. She was seen by cardiology, BP regimen adjusted, and cleared for discharge. She was also treated for questionable cellulitis around her recent L hip incision site. This has since improved and is on PO abx keflex. At time of exam she is currently CP free and epigastric pain has spontaneously improved. She does admit to poor PO intake since discharge. CT chest/abd/pelvis grossly unremarkable.      Surgical History  L hip replacement, colonoscopy, hysterectomy, L reverse total shoulder replacement, R carpal tunnel release    Social History  Former smoker, denies EtOH, and illicit    Family History  CAD, HTN, HLD, breast CA, colon CA    Allergies  Aspirin, Azithromycin, Corticosteroids (glucocorticoids), Diclofenac, Penicillins, Tetracycline, and Tramadol    Review of Systems   Respiratory:  Positive for shortness of breath.    Cardiovascular:  Positive for chest pain.   Gastrointestinal:  Positive for abdominal pain, nausea and vomiting.   All other systems reviewed and are negative.    Physical Exam  G: aox3, NAD, cooperative  HENT: neck supple, no JVD  Eyes: clear sclera  CV: RRR s1 s2  L: very mild scattered end exp wheezes, no conversational dyspnea, no acc muscle use, no tachypnea  Abd: soft, NT, non distended  Ext: no c/c/e  N: no appreciable acute focal  deficits  Psych: appropriate mood and behavior      Last Recorded Vitals  /58 (BP Location: Right arm, Patient Position: Sitting)   Pulse 92   Temp 36.3 °C (97.3 °F) (Temporal)   Resp 16   Wt 95.3 kg (210 lb)   SpO2 94%     Assessment/Plan     Acute hypoxic respiratory failure, chronic sob, COPD. KAVEH not on CPAP  Atypical CP  Epigastric pain, n/v  GERD  ALEXUS  Hypomagnesemia  Recent L hip replacement, 10/24 ([facility])  Questionable recent cellulitis around incision site on PO abx    CAD  HTN, HLD  RA  DVT ppx  Full code    Plan:  - Very mild end exp wheezes, will schedule duonebs, wean O2 as tolerated, also has KAVEH but apparently does not tolerate CPAP, not on maintenance therapy for COPD, will ask pulmonary  to see, states has very chronic SOB, incentive spirometer, CTA grossly unremarkable, can arrange for home O2 if needed, eventual ambulatory  pulse ox  - CP very atypical, just saw cardiology during recent admission, troponin x2 wnl, will consult cardiology, continue home ASA, Bblocker, statin, norvasc  - Some of her epigastric pain sounds like GERD, will continue H2 blocker and PPI, PRN antiemetic, CT unremarkable, if persists can potentially order RUQ US, consult GI  - IVF, CT without obstruction, obtain PVR, hold home ARB and diuretics, renally dose home gabapentin  - Site around recent hip replacement unremarkable, will finish recent course of her PO abx keflex  - Replace Mg, follow up AM level  - PT/OT yara Barajas, DO

## 2024-11-06 NOTE — PROGRESS NOTES
EKG interpreted by me.  Sinus rhythm.  Rate 73.  No acute ischemic changes.  No ST elevation.  Normal QRS duration.

## 2024-11-06 NOTE — ED PROVIDER NOTES
Patient was turned over to me.  CTA negative for PE.  She is requiring oxygen now, which is new.  2 to 3 L.  Hypoxic without it.  Discussed case with the hospitalist.  Will admit to telemetry.     Scotty Morales MD  11/06/24 1204       Scotty Morales MD  11/06/24 1515       Scotty Morales MD  11/06/24 1516       Scotty Morales MD  11/06/24 1513

## 2024-11-06 NOTE — PROGRESS NOTES
Pharmacy Medication History Review    Anita Calabrese is a 74 y.o. female admitted for No Principal Problem: There is no principal problem currently on the Problem List. Please update the Problem List and refresh.. Pharmacy reviewed the patient's [id number] medications and allergies for accuracy.    The list below reflectives the updated PTA list. Please review each medication in order reconciliation for additional clarification and justification.  Prior to Admission medications    Medication Sig Start Date End Date Authorizing Provider   hydroCHLOROthiazide (HYDRODiuril) 25 mg tablet TAKE 2 TABLETS(50 MG) BY MOUTH EVERY DAY   Kamilah Osei DO   acetaminophen (Tylenol) 500 mg tablet Take 1 tablet (500 mg) by mouth every 6 hours if needed for mild pain (1 - 3). 10/26/24  Historical Provider, MD   albuterol 90 mcg/actuation inhaler Inhale 2 puffs every 4 hours if needed for shortness of breath or wheezing.   Historical Provider, MD   amLODIPine (Norvasc) 5 mg tablet Take 1 tablet (5 mg) by mouth once daily. 11/4/24 2/2/25 Veronika Maravilla,    aspirin 81 mg EC tablet Take 1 tablet (81 mg) by mouth 2 times a day. 10/26/24 12/11/24 Historical Provider, MD   atorvastatin (Lipitor) 40 mg tablet Take 1 tablet (40 mg) by mouth once daily.   Tracy M Schwab, APRN-CNP   carvedilol (Coreg) 25 mg tablet Take 1 tablet (25 mg) by mouth 2 times daily (morning and late afternoon). 11/3/24 2/1/25 Veronika Maravilla DO   cephalexin (Keflex) 500 mg capsule Take 1 capsule (500 mg) by mouth 2 times a day for 5 days. 11/3/24 11/8/24 Veronika Maravilla,    cyclobenzaprine (Flexeril) 5 mg tablet Take 1-2 tablets (5-10 mg) by mouth once daily at bedtime.   Ben Byrd MD   docusate sodium (Colace) 100 mg capsule Take 1 capsule (100 mg) by mouth every 12 hours. 10/26/24  Historical Provider, MD   famotidine (Pepcid) 20 mg tablet TAKE ONE TABLET BY MOUTH TWO TIMES A DAY   Jose Angel Ragland, DO   folic acid (Folvite) 1 mg  tablet Take 1 tablet (1 mg) by mouth once daily.   Historical Provider, MD   gabapentin (Neurontin) 600 mg tablet Take 1 tablet (600 mg) by mouth 2 times a day.   Historical Provider, MD   lansoprazole (Prevacid) 30 mg DR capsule Take 1 capsule (30 mg) by mouth once daily. Do not crush or chew.   Marly Rodriguez MD   losartan (Cozaar) 100 mg tablet Take 1 tablet (100 mg) by mouth once daily.   Jose Angel Ragland,    naloxone (Narcan) 4 mg/0.1 mL nasal spray Administer 1 spray (4 mg) into affected nostril(s) if needed for opioid reversal. May repeat every 2-3 minutes if needed, alternating nostrils, until medical assistance becomes available.   Ben Byrd MD   nitroglycerin (Nitrostat) 0.4 mg SL tablet Place 1 tablet (0.4 mg) under the tongue every 5 minutes if needed.   Historical Provider, MD   ondansetron (Zofran) 4 mg tablet Take 1 tablet (4 mg) by mouth every 12 hours if needed for nausea. 10/26/24  Historical Provider, MD        The list below reflectives the updated allergy list. Please review each documented allergy for additional clarification and justification.  Allergies  Reviewed by Kimberly Hinson on 11/6/2024        Severity Reactions Comments    Aspirin Medium Swelling Okay with 81mg dose    Azithromycin Medium Nausea/vomiting     Corticosteroids (glucocorticoids) Medium Confusion     Diclofenac Medium Swelling     Penicillins Medium Hives, Swelling     Tetracycline Medium Nausea/vomiting     Tramadol Low Nausea/vomiting             Below are additional concerns with the patient's PTA list.      Kimberly Hinson

## 2024-11-07 ENCOUNTER — APPOINTMENT (OUTPATIENT)
Dept: RADIOLOGY | Facility: HOSPITAL | Age: 74
End: 2024-11-07
Payer: MEDICARE

## 2024-11-07 LAB
ABO GROUP (TYPE) IN BLOOD: NORMAL
ALBUMIN SERPL BCP-MCNC: 2.9 G/DL (ref 3.4–5)
ALP SERPL-CCNC: 54 U/L (ref 33–136)
ALT SERPL W P-5'-P-CCNC: 8 U/L (ref 7–45)
ANION GAP SERPL CALC-SCNC: 12 MMOL/L (ref 10–20)
ANION GAP SERPL CALC-SCNC: 12 MMOL/L (ref 10–20)
ANTIBODY SCREEN: NORMAL
AST SERPL W P-5'-P-CCNC: 9 U/L (ref 9–39)
ATRIAL RATE: 73 BPM
ATRIAL RATE: 98 BPM
BILIRUB SERPL-MCNC: 0.3 MG/DL (ref 0–1.2)
BUN SERPL-MCNC: 34 MG/DL (ref 6–23)
BUN SERPL-MCNC: 36 MG/DL (ref 6–23)
CALCIUM SERPL-MCNC: 8.2 MG/DL (ref 8.6–10.3)
CALCIUM SERPL-MCNC: 8.4 MG/DL (ref 8.6–10.3)
CHLORIDE SERPL-SCNC: 104 MMOL/L (ref 98–107)
CHLORIDE SERPL-SCNC: 105 MMOL/L (ref 98–107)
CHOLEST SERPL-MCNC: 116 MG/DL (ref 0–199)
CHOLESTEROL/HDL RATIO: 2.6
CO2 SERPL-SCNC: 23 MMOL/L (ref 21–32)
CO2 SERPL-SCNC: 24 MMOL/L (ref 21–32)
CREAT SERPL-MCNC: 2.06 MG/DL (ref 0.5–1.05)
CREAT SERPL-MCNC: 2.65 MG/DL (ref 0.5–1.05)
EGFRCR SERPLBLD CKD-EPI 2021: 18 ML/MIN/1.73M*2
EGFRCR SERPLBLD CKD-EPI 2021: 25 ML/MIN/1.73M*2
ERYTHROCYTE [DISTWIDTH] IN BLOOD BY AUTOMATED COUNT: 14.2 % (ref 11.5–14.5)
ERYTHROCYTE [DISTWIDTH] IN BLOOD BY AUTOMATED COUNT: 14.2 % (ref 11.5–14.5)
FERRITIN SERPL-MCNC: 245 NG/ML (ref 8–150)
FOLATE SERPL-MCNC: >24 NG/ML
GLUCOSE BLD MANUAL STRIP-MCNC: 127 MG/DL (ref 74–99)
GLUCOSE SERPL-MCNC: 131 MG/DL (ref 74–99)
GLUCOSE SERPL-MCNC: 131 MG/DL (ref 74–99)
HCT VFR BLD AUTO: 26.7 % (ref 36–46)
HCT VFR BLD AUTO: 27.7 % (ref 36–46)
HCT VFR BLD AUTO: 28.4 % (ref 36–46)
HDLC SERPL-MCNC: 44.5 MG/DL
HGB BLD-MCNC: 7.9 G/DL (ref 12–16)
HGB BLD-MCNC: 8.3 G/DL (ref 12–16)
HGB BLD-MCNC: 8.9 G/DL (ref 12–16)
HGB RETIC QN: 29 PG (ref 28–38)
IMMATURE RETIC FRACTION: 13 %
IRON SATN MFR SERPL: 15 % (ref 25–45)
IRON SERPL-MCNC: 35 UG/DL (ref 35–150)
LACTATE SERPL-SCNC: 0.9 MMOL/L (ref 0.4–2)
LDLC SERPL CALC-MCNC: 53 MG/DL
MAGNESIUM SERPL-MCNC: 1.99 MG/DL (ref 1.6–2.4)
MCH RBC QN AUTO: 30.4 PG (ref 26–34)
MCH RBC QN AUTO: 30.7 PG (ref 26–34)
MCHC RBC AUTO-ENTMCNC: 30 G/DL (ref 32–36)
MCHC RBC AUTO-ENTMCNC: 31.3 G/DL (ref 32–36)
MCV RBC AUTO: 103 FL (ref 80–100)
MCV RBC AUTO: 97 FL (ref 80–100)
NON HDL CHOLESTEROL: 72 MG/DL (ref 0–149)
NRBC BLD-RTO: 0 /100 WBCS (ref 0–0)
NRBC BLD-RTO: 0 /100 WBCS (ref 0–0)
P AXIS: 18 DEGREES
P AXIS: 7 DEGREES
P OFFSET: 179 MS
P OFFSET: 198 MS
P ONSET: 124 MS
P ONSET: 135 MS
PLATELET # BLD AUTO: 319 X10*3/UL (ref 150–450)
PLATELET # BLD AUTO: 346 X10*3/UL (ref 150–450)
POTASSIUM SERPL-SCNC: 3.9 MMOL/L (ref 3.5–5.3)
POTASSIUM SERPL-SCNC: 4.2 MMOL/L (ref 3.5–5.3)
PR INTERVAL: 166 MS
PR INTERVAL: 184 MS
PROT SERPL-MCNC: 5.5 G/DL (ref 6.4–8.2)
Q ONSET: 216 MS
Q ONSET: 218 MS
QRS COUNT: 12 BEATS
QRS COUNT: 16 BEATS
QRS DURATION: 82 MS
QRS DURATION: 84 MS
QT INTERVAL: 358 MS
QT INTERVAL: 422 MS
QTC CALCULATION(BAZETT): 457 MS
QTC CALCULATION(BAZETT): 464 MS
QTC FREDERICIA: 421 MS
QTC FREDERICIA: 450 MS
R AXIS: -6 DEGREES
R AXIS: 2 DEGREES
RBC # BLD AUTO: 2.7 X10*6/UL (ref 4–5.2)
RBC # BLD AUTO: 2.93 X10*6/UL (ref 4–5.2)
RETICS #: 0.05 X10*6/UL (ref 0.02–0.11)
RETICS/RBC NFR AUTO: 1.7 % (ref 0.5–2)
RH FACTOR (ANTIGEN D): NORMAL
SODIUM SERPL-SCNC: 136 MMOL/L (ref 136–145)
SODIUM SERPL-SCNC: 136 MMOL/L (ref 136–145)
T AXIS: 45 DEGREES
T AXIS: 67 DEGREES
T OFFSET: 397 MS
T OFFSET: 427 MS
TIBC SERPL-MCNC: 233 UG/DL (ref 240–445)
TRIGL SERPL-MCNC: 94 MG/DL (ref 0–149)
UIBC SERPL-MCNC: 198 UG/DL (ref 110–370)
VENTRICULAR RATE: 73 BPM
VENTRICULAR RATE: 98 BPM
VIT B12 SERPL-MCNC: 407 PG/ML (ref 211–911)
VLDL: 19 MG/DL (ref 0–40)
WBC # BLD AUTO: 5.4 X10*3/UL (ref 4.4–11.3)
WBC # BLD AUTO: 5.7 X10*3/UL (ref 4.4–11.3)

## 2024-11-07 PROCEDURE — 85027 COMPLETE CBC AUTOMATED: CPT | Performed by: STUDENT IN AN ORGANIZED HEALTH CARE EDUCATION/TRAINING PROGRAM

## 2024-11-07 PROCEDURE — 76705 ECHO EXAM OF ABDOMEN: CPT

## 2024-11-07 PROCEDURE — 80061 LIPID PANEL: CPT

## 2024-11-07 PROCEDURE — 2500000004 HC RX 250 GENERAL PHARMACY W/ HCPCS (ALT 636 FOR OP/ED)

## 2024-11-07 PROCEDURE — 96372 THER/PROPH/DIAG INJ SC/IM: CPT | Performed by: STUDENT IN AN ORGANIZED HEALTH CARE EDUCATION/TRAINING PROGRAM

## 2024-11-07 PROCEDURE — 85014 HEMATOCRIT: CPT | Performed by: STUDENT IN AN ORGANIZED HEALTH CARE EDUCATION/TRAINING PROGRAM

## 2024-11-07 PROCEDURE — 99223 1ST HOSP IP/OBS HIGH 75: CPT

## 2024-11-07 PROCEDURE — 2500000005 HC RX 250 GENERAL PHARMACY W/O HCPCS: Performed by: STUDENT IN AN ORGANIZED HEALTH CARE EDUCATION/TRAINING PROGRAM

## 2024-11-07 PROCEDURE — 83540 ASSAY OF IRON: CPT | Performed by: STUDENT IN AN ORGANIZED HEALTH CARE EDUCATION/TRAINING PROGRAM

## 2024-11-07 PROCEDURE — 83605 ASSAY OF LACTIC ACID: CPT | Performed by: STUDENT IN AN ORGANIZED HEALTH CARE EDUCATION/TRAINING PROGRAM

## 2024-11-07 PROCEDURE — 97165 OT EVAL LOW COMPLEX 30 MIN: CPT | Mod: GO

## 2024-11-07 PROCEDURE — 83735 ASSAY OF MAGNESIUM: CPT | Performed by: STUDENT IN AN ORGANIZED HEALTH CARE EDUCATION/TRAINING PROGRAM

## 2024-11-07 PROCEDURE — 80048 BASIC METABOLIC PNL TOTAL CA: CPT | Performed by: STUDENT IN AN ORGANIZED HEALTH CARE EDUCATION/TRAINING PROGRAM

## 2024-11-07 PROCEDURE — 82607 VITAMIN B-12: CPT | Mod: PARLAB | Performed by: STUDENT IN AN ORGANIZED HEALTH CARE EDUCATION/TRAINING PROGRAM

## 2024-11-07 PROCEDURE — 94640 AIRWAY INHALATION TREATMENT: CPT

## 2024-11-07 PROCEDURE — 87040 BLOOD CULTURE FOR BACTERIA: CPT | Mod: PARLAB | Performed by: STUDENT IN AN ORGANIZED HEALTH CARE EDUCATION/TRAINING PROGRAM

## 2024-11-07 PROCEDURE — 36415 COLL VENOUS BLD VENIPUNCTURE: CPT | Performed by: STUDENT IN AN ORGANIZED HEALTH CARE EDUCATION/TRAINING PROGRAM

## 2024-11-07 PROCEDURE — 82746 ASSAY OF FOLIC ACID SERUM: CPT | Mod: PARLAB | Performed by: STUDENT IN AN ORGANIZED HEALTH CARE EDUCATION/TRAINING PROGRAM

## 2024-11-07 PROCEDURE — 99232 SBSQ HOSP IP/OBS MODERATE 35: CPT | Performed by: STUDENT IN AN ORGANIZED HEALTH CARE EDUCATION/TRAINING PROGRAM

## 2024-11-07 PROCEDURE — 2500000002 HC RX 250 W HCPCS SELF ADMINISTERED DRUGS (ALT 637 FOR MEDICARE OP, ALT 636 FOR OP/ED): Performed by: STUDENT IN AN ORGANIZED HEALTH CARE EDUCATION/TRAINING PROGRAM

## 2024-11-07 PROCEDURE — 2500000001 HC RX 250 WO HCPCS SELF ADMINISTERED DRUGS (ALT 637 FOR MEDICARE OP): Performed by: STUDENT IN AN ORGANIZED HEALTH CARE EDUCATION/TRAINING PROGRAM

## 2024-11-07 PROCEDURE — 82947 ASSAY GLUCOSE BLOOD QUANT: CPT

## 2024-11-07 PROCEDURE — 99222 1ST HOSP IP/OBS MODERATE 55: CPT | Performed by: NURSE PRACTITIONER

## 2024-11-07 PROCEDURE — 86901 BLOOD TYPING SEROLOGIC RH(D): CPT | Performed by: STUDENT IN AN ORGANIZED HEALTH CARE EDUCATION/TRAINING PROGRAM

## 2024-11-07 PROCEDURE — 2060000001 HC INTERMEDIATE ICU ROOM DAILY

## 2024-11-07 PROCEDURE — 85045 AUTOMATED RETICULOCYTE COUNT: CPT | Performed by: STUDENT IN AN ORGANIZED HEALTH CARE EDUCATION/TRAINING PROGRAM

## 2024-11-07 PROCEDURE — 2500000004 HC RX 250 GENERAL PHARMACY W/ HCPCS (ALT 636 FOR OP/ED): Performed by: STUDENT IN AN ORGANIZED HEALTH CARE EDUCATION/TRAINING PROGRAM

## 2024-11-07 PROCEDURE — 80048 BASIC METABOLIC PNL TOTAL CA: CPT | Mod: CCI | Performed by: STUDENT IN AN ORGANIZED HEALTH CARE EDUCATION/TRAINING PROGRAM

## 2024-11-07 PROCEDURE — 82728 ASSAY OF FERRITIN: CPT | Mod: PARLAB | Performed by: STUDENT IN AN ORGANIZED HEALTH CARE EDUCATION/TRAINING PROGRAM

## 2024-11-07 PROCEDURE — 76705 ECHO EXAM OF ABDOMEN: CPT | Performed by: RADIOLOGY

## 2024-11-07 RX ORDER — SODIUM CHLORIDE 9 MG/ML
100 INJECTION, SOLUTION INTRAVENOUS CONTINUOUS
Status: DISCONTINUED | OUTPATIENT
Start: 2024-11-07 | End: 2024-11-08

## 2024-11-07 RX ORDER — OXYCODONE HYDROCHLORIDE 5 MG/1
5 TABLET ORAL EVERY 8 HOURS
Status: DISCONTINUED | OUTPATIENT
Start: 2024-11-07 | End: 2024-11-07

## 2024-11-07 RX ORDER — ASPIRIN 81 MG/1
81 TABLET ORAL 2 TIMES DAILY
Status: DISPENSED | OUTPATIENT
Start: 2024-11-07

## 2024-11-07 RX ORDER — ASPIRIN 81 MG/1
81 TABLET ORAL DAILY
Status: DISCONTINUED | OUTPATIENT
Start: 2024-11-08 | End: 2024-11-07

## 2024-11-07 ASSESSMENT — PAIN DESCRIPTION - LOCATION
LOCATION: HIP
LOCATION: GENERALIZED

## 2024-11-07 ASSESSMENT — COGNITIVE AND FUNCTIONAL STATUS - GENERAL
TOILETING: A LITTLE
DAILY ACTIVITIY SCORE: 19
CLIMB 3 TO 5 STEPS WITH RAILING: A LITTLE
DAILY ACTIVITIY SCORE: 19
HELP NEEDED FOR BATHING: A LOT
MOBILITY SCORE: 19
STANDING UP FROM CHAIR USING ARMS: A LITTLE
MOVING TO AND FROM BED TO CHAIR: A LITTLE
CLIMB 3 TO 5 STEPS WITH RAILING: A LOT
MOVING FROM LYING ON BACK TO SITTING ON SIDE OF FLAT BED WITH BEDRAILS: A LITTLE
HELP NEEDED FOR BATHING: A LITTLE
TURNING FROM BACK TO SIDE WHILE IN FLAT BAD: A LITTLE
WALKING IN HOSPITAL ROOM: A LITTLE
TURNING FROM BACK TO SIDE WHILE IN FLAT BAD: A LITTLE
DRESSING REGULAR UPPER BODY CLOTHING: A LITTLE
DRESSING REGULAR LOWER BODY CLOTHING: A LITTLE
MOBILITY SCORE: 17
STANDING UP FROM CHAIR USING ARMS: A LITTLE
TOILETING: A LITTLE
WALKING IN HOSPITAL ROOM: A LITTLE
PERSONAL GROOMING: A LITTLE
DRESSING REGULAR LOWER BODY CLOTHING: A LOT
MOVING TO AND FROM BED TO CHAIR: A LITTLE

## 2024-11-07 ASSESSMENT — PAIN - FUNCTIONAL ASSESSMENT: PAIN_FUNCTIONAL_ASSESSMENT: 0-10

## 2024-11-07 ASSESSMENT — PAIN SCALES - GENERAL
PAINLEVEL_OUTOF10: 0 - NO PAIN
PAINLEVEL_OUTOF10: 7
PAINLEVEL_OUTOF10: 0 - NO PAIN
PAINLEVEL_OUTOF10: 5 - MODERATE PAIN
PAINLEVEL_OUTOF10: 8

## 2024-11-07 ASSESSMENT — PAIN SCALES - PAIN ASSESSMENT IN ADVANCED DEMENTIA (PAINAD): TOTALSCORE: MEDICATION (SEE MAR)

## 2024-11-07 ASSESSMENT — ACTIVITIES OF DAILY LIVING (ADL): BATHING_ASSISTANCE: MODERATE

## 2024-11-07 ASSESSMENT — PAIN DESCRIPTION - DESCRIPTORS: DESCRIPTORS: ACHING

## 2024-11-07 NOTE — PROGRESS NOTES
Physical Therapy    Physical Therapy Evaluation    Patient Name: Anita Calabrese  MRN: [id number]  Today's Date: 11/7/2024   Time Calculation  Start Time: 1109  Stop Time: 1123  Time Calculation (min): 14 min  808/808-B    Assessment/Plan   PT Assessment  PT Assessment Results: Decreased mobility, Orthopedic restrictions, Decreased endurance  End of Session Communication: Bedside nurse  End of Session Patient Position: Alarm on, Up in chair (All needs in reach and no complaints noted)  IP OR SWING BED PT PLAN  Inpatient or Swing Bed: Inpatient  PT Plan  Treatment/Interventions: Bed mobility, Transfer training, Gait training  PT Plan: Ongoing PT  PT Frequency: 3 times per week  PT Discharge Recommendations: Low intensity level of continued care  PT - OK to Discharge: Yes    Subjective     Current Problem:  1. Hypoxia          Patient Active Problem List   Diagnosis    Rheumatoid arthritis, involving unspecified site, unspecified whether rheumatoid factor present (Multi)    Atherosclerosis of coronary artery    Benign essential hypertension    Cervical spondylosis with radiculopathy    GERD (gastroesophageal reflux disease)    History of colonic polyps    Mixed hyperlipidemia    Osteoarthritis    Pain in both knees    Sleep apnea    Status post reverse total replacement of left shoulder    Trigeminal neuralgia    Vitamin D deficiency    Acute right-sided low back pain without sciatica    Status post right knee replacement    History of TIA (transient ischemic attack)    Dizziness    Shortness of breath    SORIA (dyspnea on exertion)    Pre-operative clearance    Hypoxia     General Visit Information:  General  Reason for Referral: PT Eval and Treat  Referred By: Reinaldo Barajas DO  Past Medical History Relevant to Rehab: 75 yo F with PMHx of 10/ 24 L THR [facility], L reverse TSA, CAD, HTN, HLD, TIA, GERD, COPD, and KAVEH not on CPAP, and RA presents with multiple complaints. She states she woke up this morning with n/v and  mid epigastric pain which was her primary reason for coming to the ED. She did have an episode of diarrhea yesterday which  has since resolved. In addition, she states she has had intermittent mid sternal non radiating chest pressure at rest over the past two days.  Co-Treatment: OT  Co-Treatment Reason: maximize safety and functional mobility  Prior to Session Communication: Bedside nurse  Patient Position Received: Bed, 2 rail up, Alarm on (Agreeable to PT)    Home Living:  Home Living  Home Living Comments: Pt lives with her daughter and granddaughter in a 1 story house with 4 SHREYAS with HR through front door and 2 SHREYAS without HR through side door. Bathroom has a tub/shower with seat and grab bar and has a raised toilet seat. Pt sleeps in an adjustable bed.    Prior Level of Function:  Prior Function Per Pt/Caregiver Report  Level of [place]: Independent with ADLs and functional transfers (Pt amb with SPC, is independent mostly with ADLs however has assist with L LE lower body dressing, family does all IADLs, (-) driving, family drives)    Precautions:  Precautions  Precautions Comment: Fall precautions, L LE WBAT, total hip precautions (MAE on 10/24/24)    Objective     Pain:  Pain Assessment  Pain Assessment:  (7/10 L hip pain, premedicated, repositioned for comfort s/p session)    Cognition:  Cognition  Overall Cognitive Status: Within Functional Limits    General Assessments:  Sensation  Light Touch: No apparent deficits  Strength  Strength Comments: B LE ROM and strength WFL, L LE slightly impaired 2/2 L MAE  Dynamic Standing Balance  Dynamic Standing-Comments: Fair- with RW and SBA    Functional Assessments:  Bed Mobility  Bed Mobility:  (supine to sitting: SUP with bed rail)  Transfers  Transfer:  (STS from EOB: SBA with cueing for hand placement)  Ambulation/Gait Training  Ambulation/Gait Training Performed:  (Pt amb 5' from EOB to bedside chair using RW with CGA. Pt used RW due to not having SPC  (which is her baseline), based on overall mobility, pt would able to amb well with SPC as well.)    Outcome Measures:  [facility] Basic Mobility  Turning from your back to your side while in a flat bed without using bedrails: None  Moving from lying on your back to sitting on the side of a flat bed without using bedrails: A little  Moving to and from bed to chair (including a wheelchair): A little  Standing up from a chair using your arms (e.g. wheelchair or bedside chair): A little  To walk in hospital room: A little  Climbing 3-5 steps with railing: A little  Basic Mobility - Total Score: 19     Goals:  Encounter Problems       Encounter Problems (Active)       PT Problem       STG - Pt will transition supine <> sitting with mod I  (Progressing)       Start:  11/07/24    Expected End:  11/21/24            STG - Pt will transfer STS with mod I  (Progressing)       Start:  11/07/24    Expected End:  11/21/24            STG - Pt will amb 50' using LRD with SUP  (Progressing)       Start:  11/07/24    Expected End:  11/21/24                 Education Documentation  Mobility Training, taught by Pooja Gan, PT at 11/7/2024  2:51 PM.  Learner: Patient  Readiness: Acceptance  Method: Explanation  Response: Verbalizes Understanding    Education Comments  No comments found.

## 2024-11-07 NOTE — PROGRESS NOTES
Occupational Therapy    Evaluation    Patient Name: Anita Calabrese  MRN: [id number]  Today's Date: 11/7/2024  Time Calculation  Start Time: 1108  Stop Time: 1123  Time Calculation (min): 15 min  808/808-B    Assessment  IP OT Assessment  OT Assessment: pt moving well with 3 LO2 in room with sba assist.  End of Session Communication: Bedside nurse  End of Session Patient Position: Up in chair, Alarm on    Plan:  Treatment Interventions: ADL retraining, Functional transfer training, Endurance training, Patient/family training  OT Frequency: 3 times per week  Equipment Recommended upon Discharge:  (LB adaptive equipment for lb dressing.)  OT - OK to Discharge: Yes    Subjective     Current Problem:  1. Hypoxia            General:  General  Reason for Referral: OT to eval and treat for adls and safety assessment  Referred By: Dr. Barajas  Past Medical History Relevant to Rehab: 10/ 24 THR  MICHELLE Luna  Co-Treatment: PT  Co-Treatment Reason: maximize pt safety and participation  Prior to Session Communication: Bedside nurse  Patient Position Received: Bed, 2 rail up, Alarm on  General Comment: pt agreeable to getting up in chair for lunch.    Precautions:  Precautions Comment: Hip precautions wbat. 3 LO2, IV    Vital Signs:       Pain:  Pain Assessment  0-10 (Numeric) Pain Score: 7  Pain Type: Chronic pain    Objective     Cognition:  Overall Cognitive Status: Within Functional Limits     Home Living:  Type of Home:  (Pt lives in house with daughter and granddaughter.  4 steps with handrail and tub/shwoer with seat and GB. Pt has raised toilet seat and adjustable bed.)     Prior Function:  Level of [place]:  (pt was independent with ub adls and assist for lb dressing due to precautions. family does iadls. and drives.)  Receives Help From: Family  Ambulatory Assistance:  (ambulated with spc or rolling walker.)  Hand Dominance: Right    IADL History:  IADL Comments: Family does iadl.    ADL:  Eating  Assistance: Independent  Grooming Assistance: Independent  Bathing Assistance: Moderate  UE Dressing Assistance: Independent  LE Dressing Assistance: Maximal  Toileting Assistance with Device: Minimal  Functional Assistance: Stand by  ADL Comments: due to precautions.    Activity Tolerance:  Endurance: Endurance does not limit participation in activity    Bed Mobility/Transfers:   Bed Mobility  Bed Mobility:  (supine to sit from bed with supervsion and use of bedrail .)  Transfers  Transfer:  (sit to stand from bed surface with sba and cues for hand placement)    Ambulation/Gait Training:  Functional Mobility  Functional Mobility Performed: Yes    Sitting Balance:  Static Sitting Balance  Static Sitting-Level of Assistance:  (good statice ist balance.)    Standing Balance:       Vision: Vision - Basic Assessment  Current Vision: No visual deficits   and      Sensation:  Light Touch: No apparent deficits    Strength:  Strength Comments: bue wfl for age    Perception:  Inattention/Neglect: Appears intact    Coordination:  Movements are Fluid and Coordinated: Yes     Hand Function:  Hand Function  Gross Grasp: Functional  Coordination: Functional    Extremities: RUE   RUE : Within Functional Limits and      Outcome Measures: [facility] Daily Activity  Putting on and taking off regular lower body clothing: A lot  Bathing (including washing, rinsing, drying): A lot  Putting on and taking off regular upper body clothing: None  Toileting, which includes using toilet, bedpan or urinal: A little  Taking care of personal grooming such as brushing teeth: None  Eating Meals: None  Daily Activity - Total Score: 19     EDUCATION:     Education Documentation  Body Mechanics, taught by Daisha Velazquez OT at 11/7/2024  1:44 PM.  Learner: Patient  Readiness: Acceptance  Method: Explanation  Response: Needs Reinforcement    Home Exercise Program, taught by Daisha Velazquez OT at 11/7/2024  1:44 PM.  Learner: Patient  Readiness:  Acceptance  Method: Explanation  Response: Needs Reinforcement    ADL Training, taught by Daisha Velazquez OT at 11/7/2024  1:44 PM.  Learner: Patient  Readiness: Acceptance  Method: Explanation  Response: Needs Reinforcement    Precautions, taught by Daisha Velazquez OT at 11/7/2024  1:44 PM.  Learner: Patient  Readiness: Acceptance  Method: Explanation  Response: Needs Reinforcement    Education Comments  No comments found.        Goals:   Encounter Problems       Encounter Problems (Active)       Bathing       STG - Patient will bathe body in seated position with set up for ub with supervision       Start:  11/07/24    Expected End:  11/21/24               Dressings Lower Extremities       STG - Patient will complete lower body dressing with adaptive equipment and adhereing to precautions with mod I for pants and socks.        Start:  11/07/24    Expected End:  11/21/24               Functional Mobility       LTG - Patient will demonstrate safe mobility requirements with lrad with mod I.        Start:  11/07/24    Expected End:  11/21/24               OT Transfers       STG - Patient will perform toilet transfer with supervision        Start:  11/07/24    Expected End:  11/21/24               Toileting       STG - Patient will complete toileting tasks with sba with lrad including hygiene and clothing management.        Start:  11/07/24    Expected End:  11/21/24

## 2024-11-07 NOTE — CARE PLAN
The patient's goals for the shift include      The clinical goals for the shift include decreased sob    Over the shift, the patient will have decreased shortness of breath and will refrain from further complications

## 2024-11-07 NOTE — PROGRESS NOTES
Anita Calabrese is a 74 y.o. female on day 0 of admission presenting with Hypoxia.      Subjective   Still with intermittent non radiating midsternal CP, some mild asymptomatic hypotension        Objective     Last Recorded Vitals  BP 90/60 Comment: manual  Pulse 70   Temp 36.8 °C (98.2 °F) (Temporal)   Resp 19   Wt 95.3 kg (210 lb)   SpO2 92%     Physical Exam    G: aox3, NAD, cooperative  HENT: neck supple, no JVD  Eyes: clear sclera  CV: RRR s1 s2  L: very mild scattered end exp wheezes, no conversational dyspnea, no acc muscle use, no tachypnea  Abd: soft, NT, non distended  Ext: no c/c/e  N: no appreciable acute focal deficits  Psych: appropriate mood and behavior    Assessment/Plan  Acute hypoxic respiratory failure, chronic sob, COPD. KAVEH not on CPAP  Atypical CP, CAD  ALEXUS, probable BO  Asymptomatic hypotension  Worsening anemia, epigastric pain, GERD  Recent L hip replacement, 10/24 ([facility])  Questionable recent cellulitis around incision site on PO abx     HTN, HLD  RA  DVT ppx  Full code     Plan:  - Discussed with cardiology, would like to perform LHC once hopefully renal function improved, today with asymptomatic hypotension will be holding Bblocker, also holding ASA for now given drop in Hgb (was on BID dosing given recent L hip replacement), continue statin  - Continue scheduled duoneb, seen by pulmonary and added IV steroids, wean O2 as tolerated, may need home O2  - Has had several recent contrast studies, nephrology consulted, continue IVF, trend Cr, strict I/Os, renally dose gabapentin, hold BP meds ARB/diuretics  - Hold all BP meds, IVF bolus, continuous IVF, lactate normal, no fevers, leukocytosis, UA pending, as a precaution blood cx collected, Hgb drop, with epigastric pain and has  been on ASA BID since hip replacement, continue BID IV PPI, consult GI, clear liquids, NPO after midnight, repeat Hgb this evening, has been consented for blood if needed, continue home H2 blocker, RUQ US  ordered, initial CT without significant pathology to explain pain, may also be PUD, add on iron studies  - Site around recent hip replacement unremarkable, will finish recent course of her PO abx keflex  - PT/OT yara Barajas, DO

## 2024-11-07 NOTE — CONSULTS
NEPHROLOGY CONSULTATION NOTE    DATE OF CONSULTATION:  11/07/24     Referring Physician: Reinaldo Barajas DO    REASON FOR CONSULT: ALEXUS    HISTORY OF PRESENT ILLNESS:   Anita Calabrese is a 74 y.o. female presenting with shortness of breath.  Patient had left hip arthroplasty end of October at [facility] and after 2 days she got discharged.  She came back to the hospital at [place] because of elevated blood pressure which was noticed by her visiting nurse.  She had CTA of the chest at that time which was unremarkable.  There was a suspicion for a renal mass which required a CT abdomen with contrast and it revealed bilateral simple renal cyst.  She had a creatinine of 0.9 on admission during her last time and when she got discharged creatinine had gone up to 1.2.  When she came in this time with shortness of breath creatinine was around 1.7.  She got a CT chest with contrast which again did not show any PE.  Creatinine has gone up to 2.06 and thereby nephrology consultation is requested.    Patient had been taking losartan 100 mg daily and her hydrochlorothiazide was increased to 50 mg daily.  She was also taking Coreg 25 mg twice daily and amlodipine 5 mg daily which was started during her last admission because of the elevated blood pressure.  Patient states that her blood pressure had always been well-controlled and it only went up after her surgery on the hip.  She has not been taking any nonsteroidal agent.    Based on her previous lab results it appears that her creatinine has been in the 0.9-1.2 range.  She had been advised by her orthopedic doctor to avoid any anti-inflammatory medications in the past.    PAST MEDICAL HISTORY:   1.  Hypertension  2.  Osteoarthritis  3.  Reflux sympathetic dystrophy    SURGICAL HISTORY:   Past Surgical History:   Procedure Laterality Date    OTHER SURGICAL HISTORY  06/12/2019    Shoulder surgery    OTHER SURGICAL HISTORY  06/12/2019    Knee replacement    OTHER SURGICAL  HISTORY  2019     section    OTHER SURGICAL HISTORY  2019    Hysterectomy           SOCIAL HISTORY:   She reports that she quit smoking about 18 years ago. Her smoking use included cigarettes. She has been exposed to tobacco smoke. She has never used smokeless tobacco. She reports that she does not currently use alcohol. She reports that she does not use drugs.  She started smoking in her teenage years.  Good smoking around 2006.  She occasionally drinks alcohol.  She lives at home and her daughter lives with her.  She has another daughter who lives about a block away.  She worked for [employer].  She is retired.    FAMILY HISTORY:  Family History   Problem Relation Name Age of Onset    Cancer Mother      Heart disease Father     She denies any kidney disease in the family.    ALLERGIES:  Aspirin, Azithromycin, Corticosteroids (glucocorticoids), Diclofenac, Penicillins, Tetracycline, and Tramadol    MEDICATIONS:     No current facility-administered medications on file prior to encounter.     Current Outpatient Medications on File Prior to Encounter   Medication Sig Dispense Refill    hydroCHLOROthiazide (HYDRODiuril) 25 mg tablet TAKE 2 TABLETS(50 MG) BY MOUTH EVERY  tablet 1    acetaminophen (Tylenol) 500 mg tablet Take 1 tablet (500 mg) by mouth every 6 hours if needed for mild pain (1 - 3).      albuterol 90 mcg/actuation inhaler Inhale 2 puffs every 4 hours if needed for shortness of breath or wheezing.      amLODIPine (Norvasc) 5 mg tablet Take 1 tablet (5 mg) by mouth once daily. 30 tablet 2    aspirin 81 mg EC tablet Take 1 tablet (81 mg) by mouth 2 times a day.      atorvastatin (Lipitor) 40 mg tablet Take 1 tablet (40 mg) by mouth once daily. 90 tablet 3    benzonatate (Tessalon) 100 mg capsule Take 1 capsule (100 mg) by mouth 3 times a day as needed for cough. (Patient not taking: Reported on 10/30/2024)      carvedilol (Coreg) 25 mg tablet Take 1 tablet (25 mg) by mouth  2 times daily (morning and late afternoon). 60 tablet 2    cephalexin (Keflex) 500 mg capsule Take 1 capsule (500 mg) by mouth 2 times a day for 5 days. 10 capsule 0    cyclobenzaprine (Flexeril) 5 mg tablet Take 1-2 tablets (5-10 mg) by mouth once daily at bedtime. 60 tablet 0    docusate sodium (Colace) 100 mg capsule Take 1 capsule (100 mg) by mouth every 12 hours.      famotidine (Pepcid) 20 mg tablet TAKE ONE TABLET BY MOUTH TWO TIMES A DAY 60 tablet 3    folic acid (Folvite) 1 mg tablet Take 1 tablet (1 mg) by mouth once daily.      gabapentin (Neurontin) 600 mg tablet Take 1 tablet (600 mg) by mouth 2 times a day.      lansoprazole (Prevacid) 30 mg DR capsule Take 1 capsule (30 mg) by mouth once daily. Do not crush or chew. 90 capsule 3    losartan (Cozaar) 100 mg tablet Take 1 tablet (100 mg) by mouth once daily. 90 tablet 1    naloxone (Narcan) 4 mg/0.1 mL nasal spray Administer 1 spray (4 mg) into affected nostril(s) if needed for opioid reversal. May repeat every 2-3 minutes if needed, alternating nostrils, until medical assistance becomes available. 2 each 0    nitroglycerin (Nitrostat) 0.4 mg SL tablet Place 1 tablet (0.4 mg) under the tongue every 5 minutes if needed.      ondansetron (Zofran) 4 mg tablet Take 1 tablet (4 mg) by mouth every 12 hours if needed for nausea.      [DISCONTINUED] hydroxychloroquine (Plaquenil) 200 mg tablet Take 1 tablet (200 mg) by mouth 2 times a day.      [DISCONTINUED] oxyCODONE (Roxicodone) 5 mg immediate release tablet Take 1-2 tablets (5-10 mg) by mouth every 6 hours if needed for severe pain (7 - 10) or moderate pain (4 - 6).          Scheduled medications  amLODIPine, 5 mg, oral, Daily  aspirin, 81 mg, oral, BID  atorvastatin, 40 mg, oral, Daily  carvedilol, 25 mg, oral, BID  cephalexin, 500 mg, oral, BID  cyclobenzaprine, 5 mg, oral, Nightly  famotidine, 20 mg, oral, BID  folic acid, 1 mg, oral, Daily  gabapentin, 400 mg, oral, BID  heparin (porcine), 5,000 Units,  subcutaneous, q8h  ipratropium-albuteroL, 3 mL, nebulization, TID  oxygen, , inhalation, Continuous - Inhalation  pantoprazole, 40 mg, intravenous, BID      Continuous medications  sodium chloride 0.9%, 100 mL/hr, Last Rate: 100 mL/hr (11/07/24 0528)      PRN medications  PRN medications: acetaminophen, ipratropium-albuteroL, ondansetron     REVIEW OF SYSTEMS:    No headache, no blurry vision, no fever or chills,  no chest pain, no shortness of breath, no nausea, no vomiting, no diarrhea, no constipation , no focal weakness, no burning urination, no leg swelling. Rest of the 10 point ROS is negative     PHYSICAL EXAM:    Visit Vitals  /57 (BP Location: Right arm, Patient Position: Lying)   Pulse 77   Temp 36.6 °C (97.9 °F) (Temporal)   Resp 18        GENERAL: Awake and Alert, in no distress, Cooperative  EYES: EOMI,  no Pallor noted  HEENT: oral mucosa moist, nasal cannula noted  NECK: supple  CHEST: no tachypnea, no crackles, no wheeze  CVS: S1, S2 heard, Regular Rate and Rhythm, no murmurs, no rubs  ABDOMEN: soft, non tender, bowel sounds present, no distention  MSK: No joint effusion, no tenderness  NEURO: No focal deficit, moving all extremities, no tremor  EXT: no leg edema, left hip scar  PSYCH: normal affect         I&O 24HR    Intake/Output Summary (Last 24 hours) at 11/7/2024 1109  Last data filed at 11/7/2024 0900  Gross per 24 hour   Intake 1511.67 ml   Output --   Net 1511.67 ml       RESULTS:         Lab Results   Component Value Date    WBC 5.7 11/07/2024    HGB 8.9 (L) 11/07/2024    HCT 28.4 (L) 11/07/2024    MCV 97 11/07/2024     11/07/2024      Lab Results   Component Value Date    GLUCOSE 131 (H) 11/07/2024    CALCIUM 8.4 (L) 11/07/2024     11/07/2024    K 3.9 11/07/2024    CO2 24 11/07/2024     11/07/2024    BUN 34 (H) 11/07/2024    CREATININE 2.06 (H) 11/07/2024         === 10/30/24 ===    XR CHEST 1 VIEW    - Impression -  1.  No evidence of acute cardiopulmonary  process.      Signed by: Aguila Causey 10/30/2024 12:46 PM  Dictation workstation:   [id number]     ASSESSMENT/ PLAN:     This is a 74 y.o. year old female who presents with shortness of breath    1.  ALEXUS: This is secondary to contrast nephropathy.  Patient had a creatinine of 1.2 at time of discharge yesterday and when she came in this admission creatinine was around 1.7 and it has gone up to 2.1.  She has been exposed to contrast thrice.  Twice during her last admission for a CTA chest and also CT contrast for the abdomen and then subsequently she got a CTA chest this admission.  She has noticed some drop in urine output.  She is getting IV normal saline.  Continue current hydration.  Continue to hold her losartan and thiazide diuretic.  Trend renal panel.  She may have an underlying CKD due to hypertensive nephrosclerosis.    2.  Hypertension: She is currently on Coreg and amlodipine.  Her losartan and thiazide diuretic are on hold.  Blood pressure systolic is around 100.      Thank you very much for allowing me to participate in the care of this patient.     This note was created using dragon dictation and may contain unintended errors    DANIELLE ANDERSON MD    11/07/24

## 2024-11-07 NOTE — CONSULTS
Anita Calabrese is a 74 y.o. female on day 0 of admission presenting with Hypoxia.      Assessment / Plan        Reason for cardiology consult: Atypical chest pain    75 yo F with PMHx of CAD, HFpEF (echo 3/15/2024 EF 55 to 60%), HTN, HLD, TIA, Former 40 pack year smoker, KAVEH not on cpap, presents with n/v and mid epigastric pain upon waking up in the morning. In addition, she states she has had intermittent mid sternal non radiating chest pressure at rest over the past two days. She has chronic sob and in the ED was found to be hypoxic requiring 2 L NC at rest. She was just discharged from [facility] on 11/3 after being admitted with atypical CP, palpitations, and uncontrolled HTN. She was seen by cardiology, BP regimen adjusted with adding carvedilol, and cleared for discharge. At time of exam she is currently CP free and epigastric pain has spontaneously improved.  Patient's vitals were relatively unremarkable in ED.  Creatinine was elevated at 2.06.  Troponins 9, 8.  CT chest/abd/pelvis grossly unremarkable.  EKG was not remarkable for any acute ischemic changes.      #CAD (severe coronary calcificationms  #Hypertension  #Hyperlipidemia  #Rule out atypical chest pain  #Lower abdominal pain  #Rule out ACS  #HFpEF  -Last echo on 3/15/2024 showed EF of 55 to 60%, no regional wall abnormalities, there was impaired relaxation pattern of LV diastolic filling  Plan:  -Blood pressure today on the softer side at 100/57  -5 amlodipine, 25 Coreg twice daily  -100 mL/h normal saline infusion  -Continue Lipitor 40  -Ordered lipid panel, normal  -Trend troponin  -pending kidney function improves will cath next week  -on 1L o2               Jayant Howard DO   PGY-1, Internal Medicine  This is a preliminary note, please await attending attestation for final A/P    Subjective     Patient seen and examined. No acute overnight events.  Patient not currently complaining of chest pain.  She notes that nitro did not help in the ED.   "She describes the pain as constant in the midsternal region for a few minutes.  Patient does still admit to lower abdominal pain, right lower quadrant is particularly tender to palpation.      Objective       Physical Exam:  General:  Pleasant and cooperative. No apparent distress.  HEENT:  Normocephalic, atraumatic  Chest:  Clear to auscultation bilaterally. No wheezes, rales, or rhonchi.  CV:  Regular rate and rhythm. No murmurs    Abdomen: Lower abdominal pain  Extremities:  No lower extremity edema or cyanosis.   Neurological:  AAOx3. No focal deficits.  Skin:  Warm and dry.    Last Recorded Vitals  Blood pressure 100/57, pulse 86, temperature 36.6 °C (97.9 °F), resp. rate 18, height 1.676 m (5' 6\"), weight 95.3 kg (210 lb), SpO2 91%.  Intake/Output last 3 Shifts:  I/O last 3 completed shifts:  In: 1271.7 (13.4 mL/kg) [P.O.:240; I.V.:1031.7 (10.8 mL/kg)]  Out: - (0 mL/kg)   Weight: 95.3 kg     Last CBC & BMP  Lab Results   Component Value Date    GLUCOSE 131 (H) 11/07/2024    CALCIUM 8.4 (L) 11/07/2024     11/07/2024    K 3.9 11/07/2024    CO2 24 11/07/2024     11/07/2024    BUN 34 (H) 11/07/2024    CREATININE 2.06 (H) 11/07/2024     Lab Results   Component Value Date    WBC 5.7 11/07/2024    HGB 8.9 (L) 11/07/2024    HCT 28.4 (L) 11/07/2024    MCV 97 11/07/2024     11/07/2024          "

## 2024-11-07 NOTE — PROGRESS NOTES
11/07/24 1302   Discharge Planning   Living Arrangements Children   Support Systems Family members;Children   Assistance Needed cane, bedside commode   Type of Residence Private residence   Number of Stairs to Enter Residence 2   Expected Discharge Disposition Home Health     11/7/2024  Met with pt in room, introduced self and explained role.  Verified insurance, address, phone.   PCP- Dr Jose Angel Ragland  Pharmacy- [facility] in [place]       Able to obtain and afford medications.  Takes as prescribed.   Pt is from home, stated her daughter lives with her.  Stated other daughter is around the corner. Stated the 2 of them and her grand daughter all help out.  Stated someone is almost always with her.  Pt had hip surgery on Oct 24th.  Stated is using a cane.  Has a bedside commode.  Performs own ADL's.  Has a shower seat and grab bars.  Raised toilet.  (Does not have a walker-- said she had knee surgery  a year or 2 ago and left it at her son's in [place]).   Stated she is able to drive, but daughters also help and take her to appointments.   Stated she was set up with [facility] after, and they were to come out yesterday.  Unsure name of agency-- but said will find out from her daughter.    Therapies ordered.  Await evals.  Support provided.  Ct Team will follow.   Kianna Moser RN TCC    [phone]  Therapy recommending low, ampac- 19.  Pt will let us knowname of [facility] agency once speaks with daughter.   Kianna Moser RN TCC

## 2024-11-07 NOTE — CONSULTS
"Reason For Consult  Pain, worsening anemia    History Of Present Illness  Anita Calabrese is a 74 y.o. female with PMHx of CAD, HTN, HLD, TIA, GERD, COPD, and KAVEH not on CPAP, and RA who presented 11/6/2024 with multiple complaints. She states she woke up this morning with n/v and mid epigastric pain which was her primary reason for coming to the ED. She did have an episode of diarrhea the day before which  has since resolved. In addition, she states she has had intermittent mid sternal non radiating chest pressure at rest over the past two days. She has chronic sob and in the ED was found to be hypoxic requiring 2 L NC at rest. She was just discharged from [facility] on 11/3 after being admitted with atypical CP, palpitations, and uncontrolled HTN. She was seen by cardiology, BP regimen adjusted, and cleared for discharge.     On examination patient denies any current epigastric pain, nausea, vomiting, dysphagia or diarrhea.  Does complain of persistent lower abdominal pain, slightly more on left side which started simultaneously with diarrhea.  Denies any history of overt GI bleed but states she might see black stool last week, unsure about Pepto.    Bowel movements usually every other day  Denies any NSAIDs use but is on ASA 81    EGD 5/10/2024 for dysphagia, showed nonobstructing Schatzki's ring which was biopsied, 4 cm hiatal hernia, normal stomach and duodenum.  No metaplasia or malignancy  Colonoscopy 11/24/2015 diverticulosis in sigmoid colon, hemorrhoids, otherwise unremarkable  Medical records review mention colonoscopy at [facility] \"2 years ago\" however no records can be found in our system    Laboratory data today shows H&H 8.3, earlier this a.m. 8.9, yesterday 11.0, however multiple other numbers within October/November of this year mostly within 9 range making suspicion that 11 was inaccurate drawing.  Serum iron 35, TIBC 233, saturation 15, ferritin, folate, B12 pending.  LFTs unremarkable creatinine 2.65 " (this a.m. 2.6, yesterday 1.79)     CT chest/abd/pelvis shows small amount of simple ascites in the pelvis and diverticulosis without diverticulitis, otherwise unremarkable from GI point of view, please see full official report    IRLANDA at bedside reveals small amount of hard formed medium brown stool in rectal vault without overt signs of blood      Surgical History  L hip replacement, colonoscopy, hysterectomy, L reverse total shoulder replacement, R carpal tunnel release     Social History  Former smoker, denies EtOH, and illicit     Family History  CAD, HTN, HLD, breast CA, colon CA     Allergies  Aspirin, Azithromycin, Corticosteroids (glucocorticoids), Diclofenac, Penicillins, Tetracycline, and Tramadol        Past Medical History  She has a past medical history of Atypical chest pain (05/19/2023), Primary osteoarthritis of first carpometacarpal joint of left hand (02/15/2018), RSD (reflex sympathetic dystrophy) (05/19/2023), and Wheezing (06/11/2021).    Surgical History  She has a past surgical history that includes Other surgical history (06/12/2019); Other surgical history (06/12/2019); Other surgical history (06/12/2019); and Other surgical history (06/12/2019).     Social History  She reports that she quit smoking about 18 years ago. Her smoking use included cigarettes. She has been exposed to tobacco smoke. She has never used smokeless tobacco. She reports that she does not currently use alcohol. She reports that she does not use drugs.    Family History  Family History   Problem Relation Name Age of Onset    Cancer Mother      Heart disease Father          Allergies  Aspirin, Azithromycin, Corticosteroids (glucocorticoids), Diclofenac, Penicillins, Tetracycline, and Tramadol    Review of Systems    A 10 point review of system is negative except for what is mentioned in the HPI     Physical Exam    The note was created using voice recognition transcription software. Despite proofreading, unintentional  "typographical errors may be present. Please contact the GI office with any questions or concerns.     Current Medications: reviewed    Vital Signs: Reviewed    Physical Exam:  General: no apparent distress, pleasant and cooperative  Skin:  Warm and dry, no jaundice  HEENT: No scleral icterus, no conjunctival pallor, normocephalic, atraumatic, mucous membranes moist  Neck:  atraumatic, trachea midline, no JVD  Chest:  decreased air entry to auscultation bilaterally. No wheezes, rales, or rhonchi  CV:  Regular rate and rhythm.  Positive S1/S2  Abdomen: no distension, +BS, soft, tender to palpation in lower quadrants more so on left, no rebound tenderness, no guarding, no rigidity, no discernible ascites.  IRLANDA small amount of hard medium brown stool, no overt blood  Extremities: no lower extremity edema, Chronic pigmentary changes, no cyanosis  Neurological:  A&Ox3 , no asterixis  Psychiatric: cooperative     Investigations:  Labs, radiological imaging and cardiac work up were reviewed       Last Recorded Vitals  Blood pressure 90/60, pulse 70, temperature 36.8 °C (98.2 °F), temperature source Temporal, resp. rate 19, height 1.676 m (5' 6\"), weight 95.3 kg (210 lb), SpO2 92%.    Relevant Results      Scheduled medications  [Held by provider] amLODIPine, 5 mg, oral, Daily  [Held by provider] aspirin, 81 mg, oral, BID  atorvastatin, 40 mg, oral, Daily  [Held by provider] carvedilol, 25 mg, oral, BID  cephalexin, 500 mg, oral, BID  famotidine, 20 mg, oral, BID  folic acid, 1 mg, oral, Daily  gabapentin, 400 mg, oral, BID  [Held by provider] heparin (porcine), 5,000 Units, subcutaneous, q8h  ipratropium-albuteroL, 3 mL, nebulization, TID  methylPREDNISolone sodium succinate (PF), 40 mg, intravenous, q8h  oxygen, , inhalation, Continuous - Inhalation  pantoprazole, 40 mg, intravenous, BID      Continuous medications  sodium chloride 0.9%, 100 mL/hr, Last Rate: 100 mL/hr (11/07/24 0528)  sodium chloride 0.9%, 100 mL/hr, Last " Rate: 100 mL/hr (11/07/24 1809)      PRN medications  PRN medications: acetaminophen, ipratropium-albuteroL, ondansetron    Results for orders placed or performed during the hospital encounter of 11/06/24 (from the past 24 hours)   CBC   Result Value Ref Range    WBC 5.7 4.4 - 11.3 x10*3/uL    nRBC 0.0 0.0 - 0.0 /100 WBCs    RBC 2.93 (L) 4.00 - 5.20 x10*6/uL    Hemoglobin 8.9 (L) 12.0 - 16.0 g/dL    Hematocrit 28.4 (L) 36.0 - 46.0 %    MCV 97 80 - 100 fL    MCH 30.4 26.0 - 34.0 pg    MCHC 31.3 (L) 32.0 - 36.0 g/dL    RDW 14.2 11.5 - 14.5 %    Platelets 346 150 - 450 x10*3/uL   Basic Metabolic Panel   Result Value Ref Range    Glucose 131 (H) 74 - 99 mg/dL    Sodium 136 136 - 145 mmol/L    Potassium 3.9 3.5 - 5.3 mmol/L    Chloride 104 98 - 107 mmol/L    Bicarbonate 24 21 - 32 mmol/L    Anion Gap 12 10 - 20 mmol/L    Urea Nitrogen 34 (H) 6 - 23 mg/dL    Creatinine 2.06 (H) 0.50 - 1.05 mg/dL    eGFR 25 (L) >60 mL/min/1.73m*2    Calcium 8.4 (L) 8.6 - 10.3 mg/dL   Magnesium   Result Value Ref Range    Magnesium 1.99 1.60 - 2.40 mg/dL   Lipid panel   Result Value Ref Range    Cholesterol 116 0 - 199 mg/dL    HDL-Cholesterol 44.5 mg/dL    Cholesterol/HDL Ratio 2.6     LDL Calculated 53 <=99 mg/dL    VLDL 19 0 - 40 mg/dL    Triglycerides 94 0 - 149 mg/dL    Non HDL Cholesterol 72 0 - 149 mg/dL   POCT GLUCOSE   Result Value Ref Range    POCT Glucose 127 (H) 74 - 99 mg/dL   CBC   Result Value Ref Range    WBC 5.4 4.4 - 11.3 x10*3/uL    nRBC 0.0 0.0 - 0.0 /100 WBCs    RBC 2.70 (L) 4.00 - 5.20 x10*6/uL    Hemoglobin 8.3 (L) 12.0 - 16.0 g/dL    Hematocrit 27.7 (L) 36.0 - 46.0 %     (H) 80 - 100 fL    MCH 30.7 26.0 - 34.0 pg    MCHC 30.0 (L) 32.0 - 36.0 g/dL    RDW 14.2 11.5 - 14.5 %    Platelets 319 150 - 450 x10*3/uL   Comprehensive Metabolic Panel   Result Value Ref Range    Glucose 131 (H) 74 - 99 mg/dL    Sodium 136 136 - 145 mmol/L    Potassium 4.2 3.5 - 5.3 mmol/L    Chloride 105 98 - 107 mmol/L    Bicarbonate 23  "21 - 32 mmol/L    Anion Gap 12 10 - 20 mmol/L    Urea Nitrogen 36 (H) 6 - 23 mg/dL    Creatinine 2.65 (H) 0.50 - 1.05 mg/dL    eGFR 18 (L) >60 mL/min/1.73m*2    Calcium 8.2 (L) 8.6 - 10.3 mg/dL    Albumin 2.9 (L) 3.4 - 5.0 g/dL    Alkaline Phosphatase 54 33 - 136 U/L    Total Protein 5.5 (L) 6.4 - 8.2 g/dL    AST 9 9 - 39 U/L    Bilirubin, Total 0.3 0.0 - 1.2 mg/dL    ALT 8 7 - 45 U/L   Lactate   Result Value Ref Range    Lactate 0.9 0.4 - 2.0 mmol/L   Iron and TIBC   Result Value Ref Range    Iron 35 35 - 150 ug/dL    UIBC 198 110 - 370 ug/dL    TIBC 233 (L) 240 - 445 ug/dL    % Saturation 15 (L) 25 - 45 %   Reticulocytes   Result Value Ref Range    Retic % 1.7 0.5 - 2.0 %    Retic Absolute 0.046 0.017 - 0.110 x10*6/uL    Reticulocyte Hemoglobin 29 28 - 38 pg    Immature Retic fraction 13.0 <=16.0 %          Assessment/Plan     Anita Calabrese is a 74 y.o. female with PMHx of CAD, HTN, HLD, TIA, GERD, COPD, and KAVEH not on CPAP, and RA who presented 11/6/2024 with multiple complaints. She states she woke up this morning with n/v and mid epigastric pain which was her primary reason for coming to the ED. She did have an episode of diarrhea the day before which  has since resolved. In addition, she states she has had intermittent mid sternal non radiating chest pressure at rest over the past two days. She has chronic sob and in the ED was found to be hypoxic requiring 2 L NC at rest.    EGD 5/10/2024 for dysphagia, showed nonobstructing Schatzki's ring which was biopsied, 4 cm hiatal hernia, normal stomach and duodenum.  No metaplasia or malignancy  Colonoscopy 11/24/2015 diverticulosis in sigmoid colon, hemorrhoids, otherwise unremarkable  Medical records review mention colonoscopy at [facility] \"2 years ago\" however no records can be found in our system    #Epigastric pain, resolved  #Lower abdominal pain  #Nausea vomiting resolved  #Diarrhea, resolved  #Anemia    Most likely patient experienced mild transient viral " gastroenteritis which appears to be resolved but lower abdominal pain still lingering.  Given pronounced lower abdominal pain there is need to rule out ischemic colitis but given unremarkable abdominal CT imaging from yesterday and no overt signs of blood on IRLANDA suspicion is low at this time.  Lactate 0.9    Patient denies any recent overt GI bleed except possibly 1 episode of black stool last week.  Most likely current drop in H&H is not particularly impressive and anemia is by most part of a chronic disease.  Iron studies show only mild element of ROSEANNA if any, but will wait for ferritin numbers.  IRLANDA examination today as above.  In addition EGD in May of this year was unremarkable except nonobstructing Schatzki's ring and hiatal hernia.    No emergent or urgent need in endoscopic studies at this time, especially given that patient is followed by cardiology team currently ruling out ASC with possible plans for cath next week, as well as nephrology for ALEXUS and pulmonology for acute hypoxic respiratory failure in setting of COPD and possible restrictive component.    Will choose conservative approach as of now  Continue daily abdominal exams, serial CBC, active type and screen on file, 2 large-bore IV access, replenish as appropriate  Continue Protonix 40 mg IV twice daily  Avoid low blood pressure  Await ferritin level    If lower abdominal pain improves most likely patient will need outpatient GI follow-up to discuss repeat colonoscopy.    Patient discussed with Dr. Darrel LANDIN spent 60 minutes in the professional and overall care of this patient.      Diane Mendoza, APRN-CNP

## 2024-11-07 NOTE — CONSULTS
"Pulmonology Initial Consult Note    Patient Name: Anita Calabrese   YOB: 1950    Subjective:  Anita Calabrese is a 74 y.o. female with past medical history of CAD, HTN, HLD, TIA, GERD, COPD, and KAVEH not on CPAP, and RA who presented to [facility] ED on 11/6/24 with chest pain. She describes the pain as being a mild epigastric pain which was her primary reason for coming to the ED. She also had an episode of diarrhea yesterday which has since resolved. She also endorses intermittent mid sternal non radiating chest pressure at rest over the past couple of days.     In the ED, vitals were significant for hypoxia requiring 2L NC at rest. Vitals otherwise unremarkable. Labs notable for Cr 1.79 (previously 1.20 11/3), Hgb 11.0, CTA PE showed no pulmonary embolism, mid bibasilar infiltrates/atelectasis, 3 mm left lower lobe nodule, stable. Patient admitted to Hospitalist service for evaluation of atypical chest pain. Pulmonology was consulted for evaluation of acute hypoxic respiratory failure.    On evaluation today, vitals were stable, patient was saturating 91% on 1L NC. Labs notable for Cr 2.06 (from 1.79), Hgb 8.9. Patient currently receiving scheduled nebulizers, but no steroids. Home medications reviewed and patient does not seem to be using any inhalers besides the PRN albuterol. PFTs were completed in 01/2024 and were normal. Patient had been prescribed Spiriva but had not been using.    A 10 point ROS was completed and is negative expect as stated in HPI.     Past Medical History: As stated above    Past Surgical History: L hip replacement, colonoscopy, hysterectomy, L reverse total shoulder replacement, R carpal tunnel release     Social History: Former smoker, denies EtOH, and illicit     Family History: CAD, HTN, HLD, breast CA, colon CA     Objective:    /57   Pulse 86   Temp 36.6 °C (97.9 °F)   Resp 18   Ht 1.676 m (5' 6\")   Wt 95.3 kg (210 lb)   SpO2 91%   BMI 33.89 kg/m² "     Physical Exam:  GEN: conversant, NAD, obese  HEENT: PERRL, EOMI, MMM OP clear, TMs clear bilaterally  NECK: supple, no cervical LAD, no carotid bruits  CV: S1, S2, regular, no murmur  PULM: mild scattered end expiratory wheezes  ABD: soft, NT, ND, BS+  EXT: no LE edema  NEURO: no gross focal deficits  PSYCH: appropriate affect     Assessment/Plan:  Anita Calabrese is a 74 y.o. female with past medical history of CAD, HTN, HLD, TIA, GERD, COPD, and KAVEH not on CPAP, and RA who presented to [facility] ED on 11/6/24 with chest pain. Pulmonology was consulted for evaluation of acute hypoxic respiratory failure.    Acute hypoxic respiratory failure in setting of COPD and possible restrictive component  KAVEH (not on CPAP)    Agree with scheduled nebulizers. Will add steroids. Diuresis held due to ALEXUS, defer to Cardiology and primary team.  Patient had outpatient PFTs recently and home sleep study 2022. Reports reviewed and patient would benefit for PAP HS. Patient should follow-up with Pulmonology outpatient.     I have reviewed and interpreted all lab test imaging studies and documentations from other healthcare providers.    This is a preliminary note, please await attending attestation for a finalized plan.    Ish Gaines MD  Internal Medicine PGY3  Please message me with any questions

## 2024-11-08 LAB
ALBUMIN SERPL BCP-MCNC: 3.5 G/DL (ref 3.4–5)
ANION GAP SERPL CALC-SCNC: 13 MMOL/L (ref 10–20)
ATRIAL RATE: 88 BPM
BUN SERPL-MCNC: 32 MG/DL (ref 6–23)
CALCIUM SERPL-MCNC: 8.9 MG/DL (ref 8.6–10.3)
CHLORIDE SERPL-SCNC: 106 MMOL/L (ref 98–107)
CO2 SERPL-SCNC: 22 MMOL/L (ref 21–32)
CREAT SERPL-MCNC: 1.84 MG/DL (ref 0.5–1.05)
EGFRCR SERPLBLD CKD-EPI 2021: 28 ML/MIN/1.73M*2
ERYTHROCYTE [DISTWIDTH] IN BLOOD BY AUTOMATED COUNT: 14 % (ref 11.5–14.5)
GLUCOSE SERPL-MCNC: 154 MG/DL (ref 74–99)
HCT VFR BLD AUTO: 30.1 % (ref 36–46)
HGB BLD-MCNC: 9.2 G/DL (ref 12–16)
HOLD SPECIMEN: NORMAL
MCH RBC QN AUTO: 30.3 PG (ref 26–34)
MCHC RBC AUTO-ENTMCNC: 30.6 G/DL (ref 32–36)
MCV RBC AUTO: 99 FL (ref 80–100)
NRBC BLD-RTO: 0 /100 WBCS (ref 0–0)
P AXIS: 40 DEGREES
P OFFSET: 177 MS
P ONSET: 129 MS
PHOSPHATE SERPL-MCNC: 3.2 MG/DL (ref 2.5–4.9)
PLATELET # BLD AUTO: 395 X10*3/UL (ref 150–450)
POTASSIUM SERPL-SCNC: 4.2 MMOL/L (ref 3.5–5.3)
PR INTERVAL: 168 MS
Q ONSET: 213 MS
QRS COUNT: 14 BEATS
QRS DURATION: 86 MS
QT INTERVAL: 388 MS
QTC CALCULATION(BAZETT): 469 MS
QTC FREDERICIA: 441 MS
R AXIS: -8 DEGREES
RBC # BLD AUTO: 3.04 X10*6/UL (ref 4–5.2)
SODIUM SERPL-SCNC: 137 MMOL/L (ref 136–145)
T AXIS: 53 DEGREES
T OFFSET: 407 MS
VENTRICULAR RATE: 88 BPM
WBC # BLD AUTO: 5.2 X10*3/UL (ref 4.4–11.3)

## 2024-11-08 PROCEDURE — 2060000001 HC INTERMEDIATE ICU ROOM DAILY

## 2024-11-08 PROCEDURE — 2500000004 HC RX 250 GENERAL PHARMACY W/ HCPCS (ALT 636 FOR OP/ED): Performed by: STUDENT IN AN ORGANIZED HEALTH CARE EDUCATION/TRAINING PROGRAM

## 2024-11-08 PROCEDURE — 99232 SBSQ HOSP IP/OBS MODERATE 35: CPT | Performed by: STUDENT IN AN ORGANIZED HEALTH CARE EDUCATION/TRAINING PROGRAM

## 2024-11-08 PROCEDURE — 2500000005 HC RX 250 GENERAL PHARMACY W/O HCPCS: Performed by: STUDENT IN AN ORGANIZED HEALTH CARE EDUCATION/TRAINING PROGRAM

## 2024-11-08 PROCEDURE — 2500000002 HC RX 250 W HCPCS SELF ADMINISTERED DRUGS (ALT 637 FOR MEDICARE OP, ALT 636 FOR OP/ED): Performed by: STUDENT IN AN ORGANIZED HEALTH CARE EDUCATION/TRAINING PROGRAM

## 2024-11-08 PROCEDURE — 2500000001 HC RX 250 WO HCPCS SELF ADMINISTERED DRUGS (ALT 637 FOR MEDICARE OP): Performed by: STUDENT IN AN ORGANIZED HEALTH CARE EDUCATION/TRAINING PROGRAM

## 2024-11-08 PROCEDURE — 2500000004 HC RX 250 GENERAL PHARMACY W/ HCPCS (ALT 636 FOR OP/ED)

## 2024-11-08 PROCEDURE — 94640 AIRWAY INHALATION TREATMENT: CPT

## 2024-11-08 PROCEDURE — 36415 COLL VENOUS BLD VENIPUNCTURE: CPT | Performed by: INTERNAL MEDICINE

## 2024-11-08 PROCEDURE — 99233 SBSQ HOSP IP/OBS HIGH 50: CPT | Performed by: NURSE PRACTITIONER

## 2024-11-08 PROCEDURE — 9420000001 HC RT PATIENT EDUCATION 5 MIN

## 2024-11-08 PROCEDURE — 84100 ASSAY OF PHOSPHORUS: CPT | Performed by: INTERNAL MEDICINE

## 2024-11-08 PROCEDURE — 94660 CPAP INITIATION&MGMT: CPT

## 2024-11-08 PROCEDURE — 99233 SBSQ HOSP IP/OBS HIGH 50: CPT

## 2024-11-08 PROCEDURE — 85027 COMPLETE CBC AUTOMATED: CPT | Performed by: STUDENT IN AN ORGANIZED HEALTH CARE EDUCATION/TRAINING PROGRAM

## 2024-11-08 RX ORDER — CARVEDILOL 25 MG/1
25 TABLET ORAL
Status: DISCONTINUED | OUTPATIENT
Start: 2024-11-08 | End: 2024-11-09

## 2024-11-08 RX ORDER — SODIUM CHLORIDE 9 MG/ML
100 INJECTION, SOLUTION INTRAVENOUS CONTINUOUS
Status: DISCONTINUED | OUTPATIENT
Start: 2024-11-08 | End: 2024-11-09

## 2024-11-08 RX ORDER — POLYETHYLENE GLYCOL 3350 17 G/17G
17 POWDER, FOR SOLUTION ORAL 2 TIMES DAILY
Status: DISPENSED | OUTPATIENT
Start: 2024-11-08

## 2024-11-08 RX ORDER — CARVEDILOL 3.12 MG/1
6.25 TABLET ORAL
Status: DISCONTINUED | OUTPATIENT
Start: 2024-11-08 | End: 2024-11-08

## 2024-11-08 ASSESSMENT — PAIN - FUNCTIONAL ASSESSMENT
PAIN_FUNCTIONAL_ASSESSMENT: 0-10

## 2024-11-08 ASSESSMENT — COGNITIVE AND FUNCTIONAL STATUS - GENERAL
TURNING FROM BACK TO SIDE WHILE IN FLAT BAD: A LITTLE
MOVING TO AND FROM BED TO CHAIR: A LITTLE
MOBILITY SCORE: 17
STANDING UP FROM CHAIR USING ARMS: A LITTLE
PERSONAL GROOMING: A LITTLE
DRESSING REGULAR LOWER BODY CLOTHING: A LITTLE
STANDING UP FROM CHAIR USING ARMS: A LITTLE
WALKING IN HOSPITAL ROOM: A LITTLE
HELP NEEDED FOR BATHING: A LITTLE
DRESSING REGULAR UPPER BODY CLOTHING: A LITTLE
MOVING FROM LYING ON BACK TO SITTING ON SIDE OF FLAT BED WITH BEDRAILS: A LITTLE
DAILY ACTIVITIY SCORE: 19
WALKING IN HOSPITAL ROOM: A LITTLE
CLIMB 3 TO 5 STEPS WITH RAILING: A LOT
TURNING FROM BACK TO SIDE WHILE IN FLAT BAD: A LITTLE
HELP NEEDED FOR BATHING: A LITTLE
TOILETING: A LITTLE
MOBILITY SCORE: 17
DRESSING REGULAR LOWER BODY CLOTHING: A LITTLE
DRESSING REGULAR UPPER BODY CLOTHING: A LITTLE
TOILETING: A LITTLE
DAILY ACTIVITIY SCORE: 20
MOVING FROM LYING ON BACK TO SITTING ON SIDE OF FLAT BED WITH BEDRAILS: A LITTLE
MOVING TO AND FROM BED TO CHAIR: A LITTLE
CLIMB 3 TO 5 STEPS WITH RAILING: A LOT

## 2024-11-08 ASSESSMENT — PAIN SCALES - GENERAL
PAINLEVEL_OUTOF10: 0 - NO PAIN
PAINLEVEL_OUTOF10: 3
PAINLEVEL_OUTOF10: 0 - NO PAIN
PAINLEVEL_OUTOF10: 1

## 2024-11-08 ASSESSMENT — PAIN DESCRIPTION - LOCATION: LOCATION: HIP

## 2024-11-08 ASSESSMENT — PAIN DESCRIPTION - ORIENTATION: ORIENTATION: LEFT

## 2024-11-08 NOTE — PROGRESS NOTES
This note was created using voice recognition transcription software. Despite proofreading, unintentional typographical errors may be present. Please contact the GI office with any questions or concerns.       Subjective  Patient emotional.  Upper abdominal pain that has moved to lower abdomen.  Not passing gas today.  Last BM was yesterday and was a hard ball.  No prior issues with constipation.        Physical Exam:  General: Polite, calm, resting  Skin:  Warm and dry, no jaundice  HEENT: No scleral icterus, no conjunctival pallor, normocephalic, atraumatic, mucous membranes moist  Neck:  atraumatic, trachea midline, no JVD  Chest:  Clear to auscultation bilaterally. No wheezes, rales, or rhonchi  CV:  Regular rate and rhythm.  Positive S1/S2  Abdomen: no distension, +BS, soft, non-tender to palpation, no rebound tenderness, no guarding, no rigidity, no discernible ascites   Extremities: no lower extremity edema, chronic pigmentary changes, no cyanosis  Neurological:  A&Ox3, no asterixis  Psychiatric: cooperative     Investigations:  Labs, radiological imaging and cardiac work up were reviewed        Objective:         11/7/2024     7:48 AM 11/7/2024    11:51 AM 11/7/2024     2:29 PM 11/7/2024     4:00 PM 11/7/2024     7:36 PM 11/7/2024    11:28 PM 11/8/2024     3:22 AM   Vitals   Systolic 100 86 82 90 109 89 119   Diastolic 57 50 52 60 51 58 59   Heart Rate 77  70  73 80 92   Temp 36.6 °C (97.9 °F) 36.6 °C (97.9 °F) 36.8 °C (98.2 °F)  36.9 °C (98.4 °F) 36.7 °C (98.1 °F) 36 °C (96.8 °F)   Resp 18  19  20 18 20          Medications:  [Held by provider] amLODIPine, 5 mg, oral, Daily  [Held by provider] aspirin, 81 mg, oral, BID  atorvastatin, 40 mg, oral, Daily  [Held by provider] carvedilol, 25 mg, oral, BID  cephalexin, 500 mg, oral, BID  famotidine, 20 mg, oral, BID  folic acid, 1 mg, oral, Daily  gabapentin, 400 mg, oral, BID  [Held by provider] heparin (porcine), 5,000 Units, subcutaneous,  q8h  ipratropium-albuteroL, 3 mL, nebulization, TID  methylPREDNISolone sodium succinate (PF), 40 mg, intravenous, q8h  oxygen, , inhalation, Continuous - Inhalation  pantoprazole, 40 mg, intravenous, BID         Recent Results (from the past 72 hours)   CBC and Auto Differential    Collection Time: 11/06/24  6:16 AM   Result Value Ref Range    WBC 9.0 4.4 - 11.3 x10*3/uL    nRBC 0.0 0.0 - 0.0 /100 WBCs    RBC 3.64 (L) 4.00 - 5.20 x10*6/uL    Hemoglobin 11.0 (L) 12.0 - 16.0 g/dL    Hematocrit 35.7 (L) 36.0 - 46.0 %    MCV 98 80 - 100 fL    MCH 30.2 26.0 - 34.0 pg    MCHC 30.8 (L) 32.0 - 36.0 g/dL    RDW 14.0 11.5 - 14.5 %    Platelets 396 150 - 450 x10*3/uL    Neutrophils % 77.4 40.0 - 80.0 %    Immature Granulocytes %, Automated 1.4 (H) 0.0 - 0.9 %    Lymphocytes % 12.7 13.0 - 44.0 %    Monocytes % 5.2 2.0 - 10.0 %    Eosinophils % 2.9 0.0 - 6.0 %    Basophils % 0.4 0.0 - 2.0 %    Neutrophils Absolute 6.97 (H) 1.60 - 5.50 x10*3/uL    Immature Granulocytes Absolute, Automated 0.13 0.00 - 0.50 x10*3/uL    Lymphocytes Absolute 1.14 0.80 - 3.00 x10*3/uL    Monocytes Absolute 0.47 0.05 - 0.80 x10*3/uL    Eosinophils Absolute 0.26 0.00 - 0.40 x10*3/uL    Basophils Absolute 0.04 0.00 - 0.10 x10*3/uL   Comprehensive metabolic panel    Collection Time: 11/06/24  6:16 AM   Result Value Ref Range    Glucose 144 (H) 74 - 99 mg/dL    Sodium 134 (L) 136 - 145 mmol/L    Potassium 3.7 3.5 - 5.3 mmol/L    Chloride 98 98 - 107 mmol/L    Bicarbonate 26 21 - 32 mmol/L    Anion Gap 14 10 - 20 mmol/L    Urea Nitrogen 25 (H) 6 - 23 mg/dL    Creatinine 1.79 (H) 0.50 - 1.05 mg/dL    eGFR 29 (L) >60 mL/min/1.73m*2    Calcium 9.7 8.6 - 10.3 mg/dL    Albumin 3.7 3.4 - 5.0 g/dL    Alkaline Phosphatase 64 33 - 136 U/L    Total Protein 7.6 6.4 - 8.2 g/dL    AST 14 9 - 39 U/L    Bilirubin, Total 0.6 0.0 - 1.2 mg/dL    ALT 12 7 - 45 U/L   Lipase    Collection Time: 11/06/24  6:16 AM   Result Value Ref Range    Lipase 15 9 - 82 U/L   Lactate     Collection Time: 11/06/24  6:16 AM   Result Value Ref Range    Lactate 0.9 0.4 - 2.0 mmol/L   Troponin I, High Sensitivity, Initial    Collection Time: 11/06/24  6:16 AM   Result Value Ref Range    Troponin I, High Sensitivity 9 0 - 13 ng/L   Sars-CoV-2 PCR    Collection Time: 11/06/24  6:38 AM   Result Value Ref Range    Coronavirus 2019, PCR Not Detected Not Detected   Influenza A, and B PCR    Collection Time: 11/06/24  6:38 AM   Result Value Ref Range    Flu A Result Not Detected Not Detected    Flu B Result Not Detected Not Detected   Troponin, High Sensitivity, 1 Hour    Collection Time: 11/06/24  8:19 AM   Result Value Ref Range    Troponin I, High Sensitivity 8 0 - 13 ng/L   Magnesium    Collection Time: 11/06/24  8:19 AM   Result Value Ref Range    Magnesium 1.55 (L) 1.60 - 2.40 mg/dL   ECG 12 lead    Collection Time: 11/06/24  1:25 PM   Result Value Ref Range    Ventricular Rate 73 BPM    Atrial Rate 73 BPM    KS Interval 184 ms    QRS Duration 84 ms    QT Interval 422 ms    QTC Calculation(Bazett) 464 ms    P Axis 7 degrees    R Axis -6 degrees    T Axis 67 degrees    QRS Count 12 beats    Q Onset 216 ms    P Onset 124 ms    P Offset 179 ms    T Offset 427 ms    QTC Fredericia 450 ms   CBC    Collection Time: 11/07/24  5:26 AM   Result Value Ref Range    WBC 5.7 4.4 - 11.3 x10*3/uL    nRBC 0.0 0.0 - 0.0 /100 WBCs    RBC 2.93 (L) 4.00 - 5.20 x10*6/uL    Hemoglobin 8.9 (L) 12.0 - 16.0 g/dL    Hematocrit 28.4 (L) 36.0 - 46.0 %    MCV 97 80 - 100 fL    MCH 30.4 26.0 - 34.0 pg    MCHC 31.3 (L) 32.0 - 36.0 g/dL    RDW 14.2 11.5 - 14.5 %    Platelets 346 150 - 450 x10*3/uL   Basic Metabolic Panel    Collection Time: 11/07/24  5:26 AM   Result Value Ref Range    Glucose 131 (H) 74 - 99 mg/dL    Sodium 136 136 - 145 mmol/L    Potassium 3.9 3.5 - 5.3 mmol/L    Chloride 104 98 - 107 mmol/L    Bicarbonate 24 21 - 32 mmol/L    Anion Gap 12 10 - 20 mmol/L    Urea Nitrogen 34 (H) 6 - 23 mg/dL    Creatinine 2.06 (H) 0.50  - 1.05 mg/dL    eGFR 25 (L) >60 mL/min/1.73m*2    Calcium 8.4 (L) 8.6 - 10.3 mg/dL   Magnesium    Collection Time: 11/07/24  5:26 AM   Result Value Ref Range    Magnesium 1.99 1.60 - 2.40 mg/dL   Lipid panel    Collection Time: 11/07/24  5:26 AM   Result Value Ref Range    Cholesterol 116 0 - 199 mg/dL    HDL-Cholesterol 44.5 mg/dL    Cholesterol/HDL Ratio 2.6     LDL Calculated 53 <=99 mg/dL    VLDL 19 0 - 40 mg/dL    Triglycerides 94 0 - 149 mg/dL    Non HDL Cholesterol 72 0 - 149 mg/dL   POCT GLUCOSE    Collection Time: 11/07/24 11:49 AM   Result Value Ref Range    POCT Glucose 127 (H) 74 - 99 mg/dL   CBC    Collection Time: 11/07/24  4:15 PM   Result Value Ref Range    WBC 5.4 4.4 - 11.3 x10*3/uL    nRBC 0.0 0.0 - 0.0 /100 WBCs    RBC 2.70 (L) 4.00 - 5.20 x10*6/uL    Hemoglobin 8.3 (L) 12.0 - 16.0 g/dL    Hematocrit 27.7 (L) 36.0 - 46.0 %     (H) 80 - 100 fL    MCH 30.7 26.0 - 34.0 pg    MCHC 30.0 (L) 32.0 - 36.0 g/dL    RDW 14.2 11.5 - 14.5 %    Platelets 319 150 - 450 x10*3/uL   Blood Culture    Collection Time: 11/07/24  4:15 PM    Specimen: Peripheral Venipuncture; Blood culture   Result Value Ref Range    Blood Culture Loaded on Instrument - Culture in progress    Blood Culture    Collection Time: 11/07/24  4:15 PM    Specimen: Peripheral Venipuncture; Blood culture   Result Value Ref Range    Blood Culture Loaded on Instrument - Culture in progress    Comprehensive Metabolic Panel    Collection Time: 11/07/24  4:15 PM   Result Value Ref Range    Glucose 131 (H) 74 - 99 mg/dL    Sodium 136 136 - 145 mmol/L    Potassium 4.2 3.5 - 5.3 mmol/L    Chloride 105 98 - 107 mmol/L    Bicarbonate 23 21 - 32 mmol/L    Anion Gap 12 10 - 20 mmol/L    Urea Nitrogen 36 (H) 6 - 23 mg/dL    Creatinine 2.65 (H) 0.50 - 1.05 mg/dL    eGFR 18 (L) >60 mL/min/1.73m*2    Calcium 8.2 (L) 8.6 - 10.3 mg/dL    Albumin 2.9 (L) 3.4 - 5.0 g/dL    Alkaline Phosphatase 54 33 - 136 U/L    Total Protein 5.5 (L) 6.4 - 8.2 g/dL    AST 9  9 - 39 U/L    Bilirubin, Total 0.3 0.0 - 1.2 mg/dL    ALT 8 7 - 45 U/L   Lactate    Collection Time: 11/07/24  4:15 PM   Result Value Ref Range    Lactate 0.9 0.4 - 2.0 mmol/L   Iron and TIBC    Collection Time: 11/07/24  4:15 PM   Result Value Ref Range    Iron 35 35 - 150 ug/dL    UIBC 198 110 - 370 ug/dL    TIBC 233 (L) 240 - 445 ug/dL    % Saturation 15 (L) 25 - 45 %   Reticulocytes    Collection Time: 11/07/24  4:15 PM   Result Value Ref Range    Retic % 1.7 0.5 - 2.0 %    Retic Absolute 0.046 0.017 - 0.110 x10*6/uL    Reticulocyte Hemoglobin 29 28 - 38 pg    Immature Retic fraction 13.0 <=16.0 %   Ferritin    Collection Time: 11/07/24  5:11 PM   Result Value Ref Range    Ferritin 245 (H) 8 - 150 ng/mL   Vitamin B12    Collection Time: 11/07/24  5:11 PM   Result Value Ref Range    Vitamin B12 407 211 - 911 pg/mL   Folate    Collection Time: 11/07/24  5:11 PM   Result Value Ref Range    Folate, Serum >24.0 >5.0 ng/mL   Type and screen    Collection Time: 11/07/24  5:11 PM   Result Value Ref Range    ABO TYPE O     Rh TYPE POS     ANTIBODY SCREEN NEG    Hemoglobin and Hematocrit, Blood    Collection Time: 11/07/24  8:04 PM   Result Value Ref Range    Hemoglobin 7.9 (L) 12.0 - 16.0 g/dL    Hematocrit 26.7 (L) 36.0 - 46.0 %   Renal Function Panel    Collection Time: 11/08/24  5:12 AM   Result Value Ref Range    Glucose 154 (H) 74 - 99 mg/dL    Sodium 137 136 - 145 mmol/L    Potassium 4.2 3.5 - 5.3 mmol/L    Chloride 106 98 - 107 mmol/L    Bicarbonate 22 21 - 32 mmol/L    Anion Gap 13 10 - 20 mmol/L    Urea Nitrogen 32 (H) 6 - 23 mg/dL    Creatinine 1.84 (H) 0.50 - 1.05 mg/dL    eGFR 28 (L) >60 mL/min/1.73m*2    Calcium 8.9 8.6 - 10.3 mg/dL    Phosphorus 3.2 2.5 - 4.9 mg/dL    Albumin 3.5 3.4 - 5.0 g/dL          Assessment:  This is a 75 yo Female with a PMH of CAD, HTN, HLD, TIA, GERD, COPD, and KAVEH not on CPAP, and RA who presented to [facility] on 11/6/24 with reports of n/v/epigastric pain/diarrhea.  GI was  consulted for pain/worsening anemia.  Likely gastroenteritis with residual abdominal pain.  No reports of bleeding and patient denies.  Has constipation which is likely source of lower abdominal pain.  No prior issues with constipation.  Had a small BM overnight.        Plan  1.)  Pain/worsening anemia-Trend Hgb, monitor for overt bleeding, transfuse as necessary.  Hgb 9.2 as of yesterday.  2.)  Constipation-Start Miralax BID, continue Metamucil.  Patient was encouraged to drink plenty of fluids and to move around as much as she can.  She is somewhat limited due to recent hip replacement.      Discussed patient with Dr. Esquivel.    I spent 45 minutes in the professional and overall care of this patient.      11/08/24 at 6:16 AM - TATA Parnell-CNP

## 2024-11-08 NOTE — PROGRESS NOTES
Anita Calabrese is a 74 y.o. female on day 1 of admission presenting with Hypoxia.      Subjective   Hypotension improved, sob stable,     Objective     Last Recorded Vitals  /78 (BP Location: Left arm, Patient Position: Lying)   Pulse 92   Temp 35.8 °C (96.4 °F) (Temporal)   Resp 18   Wt 95.3 kg (210 lb)   SpO2 98%     Physical Exam    G: aox3, NAD, cooperative  HENT: neck supple, no JVD  Eyes: clear sclera  CV: RRR s1 s2  L: very mild scattered end exp wheezes improved, no conversational dyspnea, no acc muscle use, no tachypnea  Abd: soft, NT, non distended  Ext: no c/c/e  N: no appreciable acute focal deficits  Psych: appropriate mood and behavior    Assessment/Plan  Acute hypoxic respiratory failure, chronic sob, AECOPD. KAVEH not on CPAP  Atypical CP, CAD  ALEXUS, probable BO  Asymptomatic hypotension - resolved  Worsening anemia (improved), epigastric pain, GERD  Recent L hip replacement, 10/24 ([facility])  Questionable recent cellulitis around incision site on PO abx     HTN, HLD  RA  DVT ppx  Full code     Plan:  - Discussed with cardiology yesterday, would like to possibly perform LHC once renal function improved, will resume ASA (was on BID dosing with recent hip replacement), continue statin, if Bps remain stable will re-introduce coreg at lower dose, continue to hold norvasc for now, monitor BP trend  - Continue scheduled duoneb, wean steroids, wean O2 as tolerated, may need home O2, pulmonary setting up home nebulizer machine, CPAP titration study  - Has had several recent contrast studies, nephrology consulted, continue IVF today, Cr downtrending, hopefully stop IVF tomorrow strict I/Os, renally dose gabapentin, hold BP meds ARB/diuretics  - Still with intermittent epigastric pain, possibly reflux in nature, on BID PPI, also for now will continue home H2 blocker,CT and RUQ US unremarkable, GI following, Hgb stable  - Site around recent hip replacement unremarkable, will finish recent course of  her PO abx keflex  - PT/OT yara Barajas, DO

## 2024-11-08 NOTE — CARE PLAN
The patient's goals for the shift include      The clinical goals for the shift include rest and comfort    Over the shift, the patient will remain hemodynamically stable and will refrain from further complications

## 2024-11-08 NOTE — PROGRESS NOTES
NEPHROLOGY PROGRESS NOTE    REASON FOR CONSULT: ALEXUS    SUBJECTIVE:    Patient did not have a good night last evening she suddenly had epigastric pain.  She tried to have a bowel movement but that pain did not get relieved.  Today the pain is a lot better.  She is NPO.  He has been evaluated by GI.  She is getting IV fluids.    OBJECTIVE:    Visit Vitals  /68 (BP Location: Left arm, Patient Position: Sitting)   Pulse 98   Temp 35.9 °C (96.6 °F) (Temporal)   Resp 20          Intake/Output Summary (Last 24 hours) at 11/8/2024 1122  Last data filed at 11/7/2024 2132  Gross per 24 hour   Intake 1356.66 ml   Output --   Net 1356.66 ml        General: Awake and Alert, In no distress, Cooperative  HEENT: Oral mucosa moist, EOMI  NECK: Supple  CHEST: No crackles, no wheeze, no tachypnea  CVS: S1,S2 heard, no rubs, RRR  ABD: Soft, mild epigastric tenderness, BS present  EXT: no edema    MEDICATION:    Scheduled medications  [Held by provider] amLODIPine, 5 mg, oral, Daily  [Held by provider] aspirin, 81 mg, oral, BID  atorvastatin, 40 mg, oral, Daily  [Held by provider] carvedilol, 25 mg, oral, BID  cephalexin, 500 mg, oral, BID  famotidine, 20 mg, oral, BID  folic acid, 1 mg, oral, Daily  gabapentin, 400 mg, oral, BID  [Held by provider] heparin (porcine), 5,000 Units, subcutaneous, q8h  ipratropium-albuteroL, 3 mL, nebulization, TID  methylPREDNISolone sodium succinate (PF), 40 mg, intravenous, q8h  oxygen, , inhalation, Continuous - Inhalation  pantoprazole, 40 mg, intravenous, BID  psyllium, 1 packet, oral, Daily      Continuous medications  sodium chloride 0.9%, 100 mL/hr, Last Rate: 100 mL/hr (11/07/24 2132)      PRN medications  PRN medications: acetaminophen, ipratropium-albuteroL, ondansetron, oxygen     RESULTS:    Lab Results   Component Value Date    WBC 5.2 11/08/2024    HGB 9.2 (L) 11/08/2024    HCT 30.1 (L) 11/08/2024    MCV 99 11/08/2024     11/08/2024        Lab Results   Component Value Date     CREATININE 1.84 (H) 11/08/2024    BUN 32 (H) 11/08/2024     11/08/2024    K 4.2 11/08/2024     11/08/2024    CO2 22 11/08/2024        Radiology Imaging reviewed      ASSESSMENT/PLAN:     1.  ALEXUS: Patient's renal function has improved with creatinine at 1.84.  Peak creatinine of 2.65.  Patient is on normal saline.  Patient with contrast associated nephropathy.  Holding her thiazide diuretic and losartan.    2.  Hypertension: Currently she is n.p.o. and her antihypertensives have been held.  Blood pressure is stable.    Thank You very much for allowing me to participate in the care of this Patient    This document was created using dragon dictation and may contain unintended error    Ernie Ames MD   11/08/24

## 2024-11-08 NOTE — NURSING NOTE
Pulmonary Disease Navigator Documentation:    Pulmonary disease education was performed by the Respiratory Therapist with a good understanding: yes  Home oxygen: none on admission.  Currently on 2 lpm nc, patient would benefit from home oxygen evaluation prior to discharge.  Discussed with medical and pulmonary team  COPD triggers discussed and when to notify physician? yes  Benefits of participating in a Pulmonary Rehab program discussed: yes  Pulmonary Rehab Referral written? yes  Home medication usage education done? Yes, Albuterol MDI.  Patient being ordered nebulizer machine and Duoneb at discharge by Dr. Amato  Pursed lip breathing education done? yes  No PFTs on file.  Vaccination status reviewed?  Yes    Comments: Patient stated she has never followed with a pulmonologist for her outpatient pulmonary needs.  Patient provided with list of area providers and strongly encouraged to follow-up.  Patient had outpatient polysomnography performed at [facility] sleep lab in August of 2022, but had not follow-up post testing.  Dr. Amato ordered patient a titration study to be performed outpatient to confirm central vs obstructive apnea and proper treatment.  Patient ordered outpatient PFTs and scheduling to contact.  Patient open to referral to [facility] for close follow-up post discharge, referral placed at this time.  Will continue to follow and address and home-going respiratory needs as appropriate.

## 2024-11-08 NOTE — PROGRESS NOTES
11/08/24 1041   Discharge Planning   Home or Post Acute Services In home services   Expected Discharge Disposition Home Health   Patient Choice   Patient / Family choosing to utilize agency / facility established prior to hospitalization Yes  (Resume [facility] home care)     Met with patient at bedside to follow up on discharge plan. Patient states she had [facility] home care prior to admission, confirmed with patient preference to resume at discharge. Referral sent to [facility] in [facility].     Addendum 1131: Received confirmation in [facility] that [facility] is active for home care, will need home care orders at discharge. Attending updated.

## 2024-11-08 NOTE — PROGRESS NOTES
Anita Calabrese is a 74 y.o. female on day 1 of admission presenting with Hypoxia.      Assessment / Plan        Reason for cardiology consult: Atypical chest pain    75 yo F with PMHx of CAD, HFpEF (echo 3/15/2024 EF 55 to 60%), HTN, TIA, Former 40 pack year smoker, GERD, KAVEH not on cpap, presents with n/v and mid epigastric pain upon waking up in the morning. In addition, she states she has had intermittent mid sternal non radiating chest pressure at rest over the past two days. She has chronic sob and in the ED was found to be hypoxic requiring 2 L NC at rest. She was just discharged from [facility] on 11/3 after being admitted with atypical CP, palpitations, and uncontrolled HTN. She was seen by cardiology, BP regimen adjusted with adding carvedilol, and cleared for discharge. At time of exam she is currently CP free and epigastric pain has spontaneously improved.  Patient's vitals were relatively unremarkable in ED.  Creatinine was elevated at 2.06.  Troponins 9, 8.  CT chest/abd/pelvis grossly unremarkable.  EKG was not remarkable for any acute ischemic changes.      #CAD (extensive coronary calcifications on CT)  #Hypertension  #Rule out atypical chest pain  # Lower abdominal pain  #Rule out ACS  #HFpEF  -Last echo on 3/15/2024 showed EF of 55 to 60%, no regional wall abnormalities, there was impaired relaxation pattern of LV diastolic filling  -Troponins flat at 9, 8  Plan:  -Holding amlodipine and Coreg in setting of soft blood pressures  -100 mL/h normal saline infusion  -Continue Lipitor 40  -Pending improvements in kidney function, will plan for cath next week  -GI consulted who suggested patient may have experienced mild transient viral gastroenteritis which appears to be largely resolved; no emergent endoscopic intervention indicated at this time.               Jayant Howard DO   PGY-1, Internal Medicine  This is a preliminary note, please await attending attestation for final A/P    Subjective  "    Patient seen and examined. No acute overnight events.  Patient is still complaining of abdominal pain.  Does not have shortness of breath above her usual baseline.  Denies chest pain    Objective       Physical Exam:  General:  Pleasant and cooperative. No apparent distress.  HEENT:  Normocephalic, atraumatic  Chest:  Clear to auscultation bilaterally. No wheezes, rales, or rhonchi.  CV:  Regular rate and rhythm. No murmurs    Abdomen: Lower abdominal pain  Extremities:  No lower extremity edema or cyanosis.   Neurological:  AAOx3. No focal deficits.  Skin:  Warm and dry.    Last Recorded Vitals  Blood pressure 119/59, pulse 92, temperature 36 °C (96.8 °F), temperature source Temporal, resp. rate 20, height 1.676 m (5' 6\"), weight 95.3 kg (210 lb), SpO2 94%.  Intake/Output last 3 Shifts:  I/O last 3 completed shifts:  In: 2868.3 (30.1 mL/kg) [P.O.:480; I.V.:1388.3 (14.6 mL/kg); IV Piggyback:1000]  Out: - (0 mL/kg)   Weight: 95.3 kg     Last CBC & BMP  Lab Results   Component Value Date    GLUCOSE 154 (H) 11/08/2024    CALCIUM 8.9 11/08/2024     11/08/2024    K 4.2 11/08/2024    CO2 22 11/08/2024     11/08/2024    BUN 32 (H) 11/08/2024    CREATININE 1.84 (H) 11/08/2024     Lab Results   Component Value Date    WBC 5.4 11/07/2024    HGB 7.9 (L) 11/07/2024    HCT 26.7 (L) 11/07/2024     (H) 11/07/2024     11/07/2024          "

## 2024-11-09 LAB
ALBUMIN SERPL BCP-MCNC: 3.3 G/DL (ref 3.4–5)
ANION GAP SERPL CALC-SCNC: 11 MMOL/L (ref 10–20)
BUN SERPL-MCNC: 24 MG/DL (ref 6–23)
CALCIUM SERPL-MCNC: 9 MG/DL (ref 8.6–10.3)
CHLORIDE SERPL-SCNC: 109 MMOL/L (ref 98–107)
CO2 SERPL-SCNC: 23 MMOL/L (ref 21–32)
CREAT SERPL-MCNC: 1.11 MG/DL (ref 0.5–1.05)
EGFRCR SERPLBLD CKD-EPI 2021: 52 ML/MIN/1.73M*2
ERYTHROCYTE [DISTWIDTH] IN BLOOD BY AUTOMATED COUNT: 13.7 % (ref 11.5–14.5)
GLUCOSE SERPL-MCNC: 100 MG/DL (ref 74–99)
HCT VFR BLD AUTO: 28.6 % (ref 36–46)
HGB BLD-MCNC: 8.7 G/DL (ref 12–16)
MCH RBC QN AUTO: 30.3 PG (ref 26–34)
MCHC RBC AUTO-ENTMCNC: 30.4 G/DL (ref 32–36)
MCV RBC AUTO: 100 FL (ref 80–100)
NRBC BLD-RTO: 0 /100 WBCS (ref 0–0)
PHOSPHATE SERPL-MCNC: 2.5 MG/DL (ref 2.5–4.9)
PLATELET # BLD AUTO: 384 X10*3/UL (ref 150–450)
POTASSIUM SERPL-SCNC: 4 MMOL/L (ref 3.5–5.3)
RBC # BLD AUTO: 2.87 X10*6/UL (ref 4–5.2)
SODIUM SERPL-SCNC: 139 MMOL/L (ref 136–145)
WBC # BLD AUTO: 7 X10*3/UL (ref 4.4–11.3)

## 2024-11-09 PROCEDURE — 99232 SBSQ HOSP IP/OBS MODERATE 35: CPT | Performed by: STUDENT IN AN ORGANIZED HEALTH CARE EDUCATION/TRAINING PROGRAM

## 2024-11-09 PROCEDURE — 2500000005 HC RX 250 GENERAL PHARMACY W/O HCPCS: Performed by: STUDENT IN AN ORGANIZED HEALTH CARE EDUCATION/TRAINING PROGRAM

## 2024-11-09 PROCEDURE — 2500000001 HC RX 250 WO HCPCS SELF ADMINISTERED DRUGS (ALT 637 FOR MEDICARE OP): Performed by: STUDENT IN AN ORGANIZED HEALTH CARE EDUCATION/TRAINING PROGRAM

## 2024-11-09 PROCEDURE — 36415 COLL VENOUS BLD VENIPUNCTURE: CPT | Performed by: STUDENT IN AN ORGANIZED HEALTH CARE EDUCATION/TRAINING PROGRAM

## 2024-11-09 PROCEDURE — 85027 COMPLETE CBC AUTOMATED: CPT | Performed by: STUDENT IN AN ORGANIZED HEALTH CARE EDUCATION/TRAINING PROGRAM

## 2024-11-09 PROCEDURE — 2500000004 HC RX 250 GENERAL PHARMACY W/ HCPCS (ALT 636 FOR OP/ED): Performed by: STUDENT IN AN ORGANIZED HEALTH CARE EDUCATION/TRAINING PROGRAM

## 2024-11-09 PROCEDURE — 2500000002 HC RX 250 W HCPCS SELF ADMINISTERED DRUGS (ALT 637 FOR MEDICARE OP, ALT 636 FOR OP/ED): Performed by: STUDENT IN AN ORGANIZED HEALTH CARE EDUCATION/TRAINING PROGRAM

## 2024-11-09 PROCEDURE — 2060000001 HC INTERMEDIATE ICU ROOM DAILY

## 2024-11-09 PROCEDURE — 84100 ASSAY OF PHOSPHORUS: CPT | Performed by: INTERNAL MEDICINE

## 2024-11-09 PROCEDURE — 94640 AIRWAY INHALATION TREATMENT: CPT

## 2024-11-09 PROCEDURE — 94660 CPAP INITIATION&MGMT: CPT

## 2024-11-09 RX ORDER — CARVEDILOL 3.12 MG/1
6.25 TABLET ORAL
Status: DISCONTINUED | OUTPATIENT
Start: 2024-11-09 | End: 2024-11-10

## 2024-11-09 RX ORDER — AMLODIPINE BESYLATE 5 MG/1
5 TABLET ORAL EVERY EVENING
Status: DISPENSED | OUTPATIENT
Start: 2024-11-09

## 2024-11-09 RX ORDER — AMLODIPINE BESYLATE 5 MG/1
5 TABLET ORAL EVERY EVENING
Status: DISCONTINUED | OUTPATIENT
Start: 2024-11-10 | End: 2024-11-09

## 2024-11-09 RX ORDER — OXYCODONE HYDROCHLORIDE 5 MG/1
5 TABLET ORAL ONCE
Status: COMPLETED | OUTPATIENT
Start: 2024-11-09 | End: 2024-11-09

## 2024-11-09 ASSESSMENT — COGNITIVE AND FUNCTIONAL STATUS - GENERAL
MOBILITY SCORE: 17
DAILY ACTIVITIY SCORE: 20
DRESSING REGULAR LOWER BODY CLOTHING: A LITTLE
DRESSING REGULAR UPPER BODY CLOTHING: A LITTLE
TOILETING: A LITTLE
TURNING FROM BACK TO SIDE WHILE IN FLAT BAD: A LITTLE
WALKING IN HOSPITAL ROOM: A LITTLE
CLIMB 3 TO 5 STEPS WITH RAILING: A LOT
HELP NEEDED FOR BATHING: A LITTLE
MOVING FROM LYING ON BACK TO SITTING ON SIDE OF FLAT BED WITH BEDRAILS: A LITTLE
MOVING TO AND FROM BED TO CHAIR: A LITTLE
STANDING UP FROM CHAIR USING ARMS: A LITTLE

## 2024-11-09 ASSESSMENT — PAIN SCALES - GENERAL
PAINLEVEL_OUTOF10: 0 - NO PAIN
PAINLEVEL_OUTOF10: 3
PAINLEVEL_OUTOF10: 6
PAINLEVEL_OUTOF10: 3
PAINLEVEL_OUTOF10: 0 - NO PAIN
PAINLEVEL_OUTOF10: 2
PAINLEVEL_OUTOF10: 4

## 2024-11-09 ASSESSMENT — PAIN DESCRIPTION - LOCATION
LOCATION: HIP

## 2024-11-09 ASSESSMENT — PAIN - FUNCTIONAL ASSESSMENT
PAIN_FUNCTIONAL_ASSESSMENT: 0-10

## 2024-11-09 ASSESSMENT — PAIN DESCRIPTION - ORIENTATION
ORIENTATION: LEFT

## 2024-11-09 NOTE — PROGRESS NOTES
NEPHROLOGY PROGRESS NOTE    REASON FOR CONSULT: ALEXUS    SUBJECTIVE:    Patient is resting comfortably.  She feels better today.  Denies any abdominal pain.  She was evaluated by cardiology and they are recommending cardiac catheterization on Tuesday.  She is going to ambulate the hallway.  Breathing is okay.  She is on nasal cannula.    OBJECTIVE:    Visit Vitals  /77 (BP Location: Left arm, Patient Position: Lying)   Pulse 90   Temp 37 °C (98.6 °F) (Temporal)   Resp 16          Intake/Output Summary (Last 24 hours) at 11/9/2024 1507  Last data filed at 11/9/2024 1200  Gross per 24 hour   Intake 2066.67 ml   Output --   Net 2066.67 ml        General: Awake and Alert, In no distress, Cooperative  HEENT: Oral mucosa moist, EOMI  NECK: Supple  CHEST: No crackles, no wheeze, no tachypnea  CVS: S1,S2 heard, no rubs, RRR  ABD: Soft, no tenderness, BS present  EXT: no edema    MEDICATION:    Scheduled medications  [Held by provider] amLODIPine, 5 mg, oral, Daily  aspirin, 81 mg, oral, BID  atorvastatin, 40 mg, oral, Daily  carvedilol, 6.25 mg, oral, BID  famotidine, 20 mg, oral, BID  folic acid, 1 mg, oral, Daily  gabapentin, 400 mg, oral, BID  heparin (porcine), 5,000 Units, subcutaneous, q8h  methylPREDNISolone sodium succinate (PF), 40 mg, intravenous, q24h  oxygen, , inhalation, Continuous - Inhalation  pantoprazole, 40 mg, intravenous, BID  polyethylene glycol, 17 g, oral, BID  psyllium, 1 packet, oral, Daily      Continuous medications       PRN medications  PRN medications: acetaminophen, ipratropium-albuteroL, ondansetron, oxygen     RESULTS:    Lab Results   Component Value Date    WBC 7.0 11/09/2024    HGB 8.7 (L) 11/09/2024    HCT 28.6 (L) 11/09/2024     11/09/2024     11/09/2024        Lab Results   Component Value Date    CREATININE 1.11 (H) 11/09/2024    BUN 24 (H) 11/09/2024     11/09/2024    K 4.0 11/09/2024     (H) 11/09/2024    CO2 23 11/09/2024        Radiology Imaging  reviewed      ASSESSMENT/PLAN:     1.  ALEXUS: Patient's renal function has improved with creatinine at 1.11.  Peak creatinine of 2.65.  Patient is off IV fluids.  Patient with contrast associated nephropathy.  Holding her thiazide diuretic and losartan since blood pressure is controlled.    2.  Hypertension: Blood pressure is stable.  Off her losartan and thiazide diuretic.  She is on Coreg 6.25 mg twice daily.    Thank You very much for allowing me to participate in the care of this Patient    This document was created using dragon dictation and may contain unintended error    Ernie Ames MD   11/09/24

## 2024-11-09 NOTE — PROGRESS NOTES
Anita Calabrese is a 74 y.o. female on day 2 of admission presenting with Hypoxia.      Subjective   Bps improved, Hgb stable, renal function improving     Objective     Last Recorded Vitals  /77 (BP Location: Left arm, Patient Position: Lying)   Pulse 90   Temp 37 °C (98.6 °F) (Temporal)   Resp 16   Wt 95.3 kg (210 lb)   SpO2 95%     Physical Exam    G: aox3, NAD, cooperative  HENT: neck supple, no JVD  Eyes: clear sclera  CV: RRR s1 s2  L: clear, no conversational dyspnea, no acc muscle use, no tachypnea  Abd: soft, NT, non distended  Ext: no c/c/e  N: no appreciable acute focal deficits  Psych: appropriate mood and behavior    Assessment/Plan  Acute hypoxic respiratory failure, chronic sob, AECOPD. KAVEH not on CPAP  Atypical CP, CAD  ALEXUS, probable BO  Asymptomatic hypotension - resolved  Worsening anemia (improved and now stable), epigastric pain, GERD  Recent L hip replacement, 10/24 ([facility])  Questionable recent cellulitis around incision site on PO abx     HTN, HLD  RA  DVT ppx  Full code     Plan:  - Renal function improved, cardiology following and now that renal function improved tentative plan for LHC on Tuesday, Bps improved, resumed home coreg at lower dose, Hgb stable and ASA resumed  (was on BID dosing with recent hip replacement), continue statin, continue to hold Norvasc  - Continue scheduled duoneb, wean steroids, wean O2 as tolerated, may need home O2, pulmonary setting up home nebulizer machine, CPAP titration study  - Has had several recent contrast studies, nephrology consulted, renal function improved, hold off on further IVF, strict I/Os, renally dose gabapentin, continue to hold ARB/diuretics  - Still with intermittent epigastric pain but much improved, possibly reflux in nature, on BID PPI, also for now will continue home H2 blocker,CT and RUQ US unremarkable, GI following, Hgb stable  - Site around recent hip replacement unremarkable, finished course of PO abx  - PT/OT evals        Reinaldo Barajas, DO

## 2024-11-09 NOTE — PROGRESS NOTES
Anita Calabrese is a 74 y.o. female on day 2 of admission presenting with Hypoxia.      Assessment / Plan        Reason for cardiology consult: Atypical chest pain    73 yo F with PMHx of CAD, HFpEF (echo 3/15/2024 EF 55 to 60%), HTN, TIA, Former 40 pack year smoker, GERD, KAVEH not on cpap, presents with n/v and mid epigastric pain upon waking up in the morning. In addition, she states she has had intermittent mid sternal non radiating chest pressure at rest over the past two days. She has chronic sob and in the ED was found to be hypoxic requiring 2 L NC at rest. She was just discharged from [facility] on 11/3 after being admitted with atypical CP, palpitations, and uncontrolled HTN. She was seen by cardiology, BP regimen adjusted with adding carvedilol, and cleared for discharge. At time of exam she is currently CP free and epigastric pain has spontaneously improved.  Patient's vitals were relatively unremarkable in ED.  Creatinine was elevated at 2.06.  Troponins 9, 8.  CT chest/abd/pelvis grossly unremarkable.  EKG was not remarkable for any acute ischemic changes.      #CAD (extensive coronary calcifications on CT)  #Hypertension  #Rule out atypical chest pain  # Lower abdominal pain  #Rule out ACS  #HFpEF  -Last echo on 3/15/2024 showed EF of 55 to 60%, no regional wall abnormalities, there was impaired relaxation pattern of LV diastolic filling  -Troponins flat at 9, 8  Plan:  -Holding amlodipine and Coreg in setting of soft blood pressures  -100 mL/h normal saline infusion  -Continue Lipitor 40  -Pending improvements in kidney function, will plan for cath next week  -GI consulted who suggested patient may have experienced mild transient viral gastroenteritis which appears to be largely resolved; no emergent endoscopic intervention indicated at this time.               Jayant Howard DO   PGY-1, Internal Medicine  This is a preliminary note, please await attending attestation for final A/P    Subjective   No  "chest pain or shortness of breath.  No acute issues overnight    Objective       Physical Exam:  General:  Pleasant and cooperative. No apparent distress.  HEENT:  Normocephalic, atraumatic  Chest:  Clear to auscultation bilaterally. No wheezes, rales, or rhonchi.  CV:  Regular rate and rhythm. No murmurs    Abdomen: Lower abdominal pain  Extremities:  No lower extremity edema or cyanosis.   Neurological:  AAOx3. No focal deficits.  Skin:  Warm and dry.    Last Recorded Vitals  Blood pressure 118/77, pulse 90, temperature 37 °C (98.6 °F), temperature source Temporal, resp. rate 16, height 1.676 m (5' 6\"), weight 95.3 kg (210 lb), SpO2 95%.  Intake/Output last 3 Shifts:  I/O last 3 completed shifts:  In: 3180.3 (33.4 mL/kg) [P.O.:837; I.V.:1343.3 (14.1 mL/kg); IV Piggyback:1000]  Out: - (0 mL/kg)   Weight: 95.3 kg     Last CBC & BMP  Lab Results   Component Value Date    GLUCOSE 100 (H) 11/09/2024    CALCIUM 9.0 11/09/2024     11/09/2024    K 4.0 11/09/2024    CO2 23 11/09/2024     (H) 11/09/2024    BUN 24 (H) 11/09/2024    CREATININE 1.11 (H) 11/09/2024     Lab Results   Component Value Date    WBC 7.0 11/09/2024    HGB 8.7 (L) 11/09/2024    HCT 28.6 (L) 11/09/2024     11/09/2024     11/09/2024 November 9, 2024  CAD (extensive coronary calcifications on CT)  #Hypertension  #Rule out atypical chest pain  # Lower abdominal pain  #Rule out ACS  #HFpEF    Patient creatinine is slowly improving.  Will continue to monitor hemoglobin as well.  Plan for left heart catheterization and coronary angiography on Tuesday as long as clinically improving.  Patient is in agreement.       "

## 2024-11-09 NOTE — CARE PLAN
Problem: Skin  Goal: Participates in plan/prevention/treatment measures  Outcome: Progressing  Flowsheets (Taken 11/8/2024 1928)  Participates in plan/prevention/treatment measures: Elevate heels     Problem: Bathing  Goal: STG - Patient will bathe body in seated position with set up for ub with supervision  Outcome: Progressing     Problem: Dressings Lower Extremities  Goal: STG - Patient will complete lower body dressing with adaptive equipment and adhereing to precautions with mod I for pants and socks.   Outcome: Progressing    The patient's goals for the shift include  get good rest    The clinical goals for the shift include Patient will maintain BP > 90 systolic throughout end of shift      Patient having uneventful shift overall. Patient is Aox4, pleasant and cooperative, RA, 1 assist up to BSC. Patient previously hypotensive, however, this shift her BP are slightly elevated. Patient HTN medications are held and patient receiving NS at 100 per orders. Patient does endorse some mild SOB with exertion, weaned from 2L NC to RA, continuous pulse ox at bedside. Patient otherwise free of complaints. VSS. Safety maintained.     2330: Patient placed on CPAP for HS  0030: Patient found off CPAP, back on RA

## 2024-11-09 NOTE — CARE PLAN
The patient's goals for the shift include  ambulate as tolerated.    The clinical goals for the shift include pt will ambulate as tolerated in the halls.        Problem: Skin  Goal: Participates in plan/prevention/treatment measures  Outcome: Progressing  Flowsheets (Taken 11/9/2024 1101)  Participates in plan/prevention/treatment measures: Elevate heels     Problem: Bathing  Goal: STG - Patient will bathe body in seated position with set up for ub with supervision  Outcome: Progressing     Problem: Dressings Lower Extremities  Goal: STG - Patient will complete lower body dressing with adaptive equipment and adhereing to precautions with mod I for pants and socks.   Outcome: Progressing     Problem: Toileting  Goal: STG - Patient will complete toileting tasks with sba with lrad including hygiene and clothing management.   Outcome: Progressing     Problem: Chronic Conditions and Co-morbidities  Goal: Patient's chronic conditions and co-morbidity symptoms are monitored and maintained or improved  Outcome: Progressing     Problem: Discharge Planning  Goal: Discharge to home or other facility with appropriate resources  Outcome: Progressing

## 2024-11-09 NOTE — DOCUMENTATION CLARIFICATION NOTE
"    PATIENT:               MAURICE LUCAS  ACCT #:                  [id number]  MRN:                       [id number]  :                       1950  ADMIT DATE:       10/30/2024 11:55 AM  DISCH DATE:        11/3/2024 5:40 PM  RESPONDING PROVIDER #:        [id number]          PROVIDER RESPONSE TEXT:    Type II MI    CDI QUERY TEXT:    Clarification        Instruction:    Based on your assessment of the patient and the clinical information, please provide the requested documentation by clicking on the appropriate radio button and enter any additional information if prompted.    Question: Is there a diagnosis indicative of the patient elevated Troponins and symptoms    When answering this query, please exercise your independent professional judgment. The fact that a question is being asked, does not imply that any particular answer is desired or expected.    The patient's clinical indicators include:  Clinical Information:  74 y.o. female with a PMHx of HTN, HLD, CAD and GERD who presented to [facility] on  with a chief complaint of chest fluttering.  Blood pressure in 190s/100s.  She stated she was not feeling well since yesterday.    Clinical Indicators:  10/30/24  H and P:  \"Elevated troponin:  Acute myocardial injury:  -Mildly elevated troponin, likely secondary to demand ischemia type II MI\"    Treatment:  monitoring lab work, EKG    Risk Factors:  CAD,  HTN, elevated troponins  Options provided:  -- Type II MI  -- Acute Myocardial Injury  -- Chronic Myocardial Injury  -- Other - I will add my own diagnosis  -- Refer to Clinical Documentation Reviewer    Query created by: Rossy Leal on 2024 11:55 AM      Electronically signed by:  EVELYNE PEREZ DO 2024 9:04 AM          "

## 2024-11-10 VITALS
WEIGHT: 210 LBS | TEMPERATURE: 96.8 F | HEIGHT: 66 IN | SYSTOLIC BLOOD PRESSURE: 141 MMHG | DIASTOLIC BLOOD PRESSURE: 70 MMHG | RESPIRATION RATE: 18 BRPM | HEART RATE: 81 BPM | OXYGEN SATURATION: 95 % | BODY MASS INDEX: 33.75 KG/M2

## 2024-11-10 LAB
ANION GAP SERPL CALC-SCNC: 11 MMOL/L (ref 10–20)
BACTERIA BLD CULT: NORMAL
BACTERIA BLD CULT: NORMAL
BUN SERPL-MCNC: 27 MG/DL (ref 6–23)
CALCIUM SERPL-MCNC: 9.1 MG/DL (ref 8.6–10.3)
CHLORIDE SERPL-SCNC: 107 MMOL/L (ref 98–107)
CO2 SERPL-SCNC: 25 MMOL/L (ref 21–32)
CREAT SERPL-MCNC: 1.03 MG/DL (ref 0.5–1.05)
EGFRCR SERPLBLD CKD-EPI 2021: 56 ML/MIN/1.73M*2
ERYTHROCYTE [DISTWIDTH] IN BLOOD BY AUTOMATED COUNT: 13.8 % (ref 11.5–14.5)
GLUCOSE SERPL-MCNC: 115 MG/DL (ref 74–99)
HCT VFR BLD AUTO: 30.9 % (ref 36–46)
HGB BLD-MCNC: 9.5 G/DL (ref 12–16)
MCH RBC QN AUTO: 30.1 PG (ref 26–34)
MCHC RBC AUTO-ENTMCNC: 30.7 G/DL (ref 32–36)
MCV RBC AUTO: 98 FL (ref 80–100)
NRBC BLD-RTO: 0 /100 WBCS (ref 0–0)
PLATELET # BLD AUTO: 419 X10*3/UL (ref 150–450)
POTASSIUM SERPL-SCNC: 3.8 MMOL/L (ref 3.5–5.3)
RBC # BLD AUTO: 3.16 X10*6/UL (ref 4–5.2)
SODIUM SERPL-SCNC: 139 MMOL/L (ref 136–145)
WBC # BLD AUTO: 6.4 X10*3/UL (ref 4.4–11.3)

## 2024-11-10 PROCEDURE — 99232 SBSQ HOSP IP/OBS MODERATE 35: CPT | Performed by: STUDENT IN AN ORGANIZED HEALTH CARE EDUCATION/TRAINING PROGRAM

## 2024-11-10 PROCEDURE — 2500000005 HC RX 250 GENERAL PHARMACY W/O HCPCS: Performed by: STUDENT IN AN ORGANIZED HEALTH CARE EDUCATION/TRAINING PROGRAM

## 2024-11-10 PROCEDURE — 85027 COMPLETE CBC AUTOMATED: CPT | Performed by: STUDENT IN AN ORGANIZED HEALTH CARE EDUCATION/TRAINING PROGRAM

## 2024-11-10 PROCEDURE — 2500000001 HC RX 250 WO HCPCS SELF ADMINISTERED DRUGS (ALT 637 FOR MEDICARE OP): Performed by: STUDENT IN AN ORGANIZED HEALTH CARE EDUCATION/TRAINING PROGRAM

## 2024-11-10 PROCEDURE — 99232 SBSQ HOSP IP/OBS MODERATE 35: CPT | Performed by: NURSE PRACTITIONER

## 2024-11-10 PROCEDURE — 2500000004 HC RX 250 GENERAL PHARMACY W/ HCPCS (ALT 636 FOR OP/ED): Performed by: STUDENT IN AN ORGANIZED HEALTH CARE EDUCATION/TRAINING PROGRAM

## 2024-11-10 PROCEDURE — 2500000004 HC RX 250 GENERAL PHARMACY W/ HCPCS (ALT 636 FOR OP/ED): Performed by: NURSE PRACTITIONER

## 2024-11-10 PROCEDURE — 2060000001 HC INTERMEDIATE ICU ROOM DAILY

## 2024-11-10 PROCEDURE — 80048 BASIC METABOLIC PNL TOTAL CA: CPT | Performed by: STUDENT IN AN ORGANIZED HEALTH CARE EDUCATION/TRAINING PROGRAM

## 2024-11-10 PROCEDURE — 36415 COLL VENOUS BLD VENIPUNCTURE: CPT | Performed by: STUDENT IN AN ORGANIZED HEALTH CARE EDUCATION/TRAINING PROGRAM

## 2024-11-10 RX ORDER — GABAPENTIN 300 MG/1
600 CAPSULE ORAL 2 TIMES DAILY
Status: DISPENSED | OUTPATIENT
Start: 2024-11-10

## 2024-11-10 RX ORDER — CARVEDILOL 12.5 MG/1
12.5 TABLET ORAL
Status: DISPENSED | OUTPATIENT
Start: 2024-11-10

## 2024-11-10 RX ORDER — PREDNISONE 20 MG/1
40 TABLET ORAL DAILY
Status: ACTIVE | OUTPATIENT
Start: 2024-11-11

## 2024-11-10 RX ORDER — OXYCODONE HYDROCHLORIDE 5 MG/1
5 TABLET ORAL EVERY 6 HOURS PRN
Status: DISPENSED | OUTPATIENT
Start: 2024-11-10

## 2024-11-10 ASSESSMENT — COGNITIVE AND FUNCTIONAL STATUS - GENERAL
PERSONAL GROOMING: A LITTLE
TOILETING: A LITTLE
STANDING UP FROM CHAIR USING ARMS: A LITTLE
WALKING IN HOSPITAL ROOM: A LITTLE
HELP NEEDED FOR BATHING: A LITTLE
DAILY ACTIVITIY SCORE: 18
MOVING FROM LYING ON BACK TO SITTING ON SIDE OF FLAT BED WITH BEDRAILS: A LITTLE
MOVING TO AND FROM BED TO CHAIR: A LITTLE
CLIMB 3 TO 5 STEPS WITH RAILING: A LITTLE
MOBILITY SCORE: 20
CLIMB 3 TO 5 STEPS WITH RAILING: A LITTLE
DRESSING REGULAR UPPER BODY CLOTHING: A LITTLE
DRESSING REGULAR LOWER BODY CLOTHING: A LITTLE
MOVING TO AND FROM BED TO CHAIR: A LITTLE
TOILETING: A LITTLE
DRESSING REGULAR UPPER BODY CLOTHING: A LITTLE
STANDING UP FROM CHAIR USING ARMS: A LITTLE
EATING MEALS: A LITTLE
DAILY ACTIVITIY SCORE: 22
TURNING FROM BACK TO SIDE WHILE IN FLAT BAD: A LITTLE
WALKING IN HOSPITAL ROOM: A LITTLE
MOBILITY SCORE: 18

## 2024-11-10 ASSESSMENT — PAIN SCALES - GENERAL
PAINLEVEL_OUTOF10: 8
PAINLEVEL_OUTOF10: 5 - MODERATE PAIN
PAINLEVEL_OUTOF10: 0 - NO PAIN
PAINLEVEL_OUTOF10: 0 - NO PAIN

## 2024-11-10 ASSESSMENT — PAIN - FUNCTIONAL ASSESSMENT
PAIN_FUNCTIONAL_ASSESSMENT: 0-10
PAIN_FUNCTIONAL_ASSESSMENT: 0-10

## 2024-11-10 NOTE — PROGRESS NOTES
NEPHROLOGY PROGRESS NOTE    REASON FOR CONSULT: ALEXUS    SUBJECTIVE:    Patient feels well.  She has left hip pain.  She had recent surgery for hip replacement.  She ambulated the hallway twice yesterday.  She will need cardiac catheterization Tuesday.    OBJECTIVE:    Visit Vitals  /85 (BP Location: Left arm, Patient Position: Lying)   Pulse 84   Temp 36.6 °C (97.9 °F) (Temporal)   Resp 16          Intake/Output Summary (Last 24 hours) at 11/10/2024 0922  Last data filed at 11/10/2024 0900  Gross per 24 hour   Intake 740 ml   Output --   Net 740 ml        General: Awake and Alert, In no distress, Cooperative  HEENT: Oral mucosa moist, EOMI  NECK: Supple  CHEST: No crackles, no wheeze, no tachypnea  CVS: S1,S2 heard, no rubs, RRR  ABD: Soft, no tenderness, BS present  EXT: no edema,  Left hip tenderness    MEDICATION:    Scheduled medications  amLODIPine, 5 mg, oral, q PM  aspirin, 81 mg, oral, BID  atorvastatin, 40 mg, oral, Daily  carvedilol, 6.25 mg, oral, BID  famotidine, 20 mg, oral, BID  folic acid, 1 mg, oral, Daily  gabapentin, 400 mg, oral, BID  heparin (porcine), 5,000 Units, subcutaneous, q8h  methylPREDNISolone sodium succinate (PF), 40 mg, intravenous, q24h  oxygen, , inhalation, Continuous - Inhalation  pantoprazole, 40 mg, intravenous, BID  polyethylene glycol, 17 g, oral, BID  psyllium, 1 packet, oral, Daily      Continuous medications       PRN medications  PRN medications: acetaminophen, ipratropium-albuteroL, ondansetron, oxyCODONE, oxygen     RESULTS:    Lab Results   Component Value Date    WBC 6.4 11/10/2024    HGB 9.5 (L) 11/10/2024    HCT 30.9 (L) 11/10/2024    MCV 98 11/10/2024     11/10/2024        Lab Results   Component Value Date    CREATININE 1.03 11/10/2024    BUN 27 (H) 11/10/2024     11/10/2024    K 3.8 11/10/2024     11/10/2024    CO2 25 11/10/2024        Radiology Imaging reviewed      ASSESSMENT/PLAN:     1.  ALEXUS: Patient's renal function has improved with  creatinine at 1.03.  Peak creatinine of 2.65.  Patient is off IV fluids.  Patient with contrast associated nephropathy.  Holding her thiazide diuretic and losartan since blood pressure is controlled.    2.  Hypertension: Blood pressure is stable.  Off her losartan and thiazide diuretic.  She is on Coreg 6.25 mg twice daily and amlodipine 5 mg daily.    Thank You very much for allowing me to participate in the care of this Patient    This document was created using dragon dictation and may contain unintended error    Ernie Ames MD   11/10/24

## 2024-11-10 NOTE — CARE PLAN
The patient's goals for the shift include rest.     The clinical goals for the shift include pt will ambulate as tolerated.        Problem: Skin  Goal: Participates in plan/prevention/treatment measures  Outcome: Progressing  Flowsheets (Taken 11/10/2024 8548)  Participates in plan/prevention/treatment measures: Elevate heels     Problem: Bathing  Goal: STG - Patient will bathe body in seated position with set up for ub with supervision  Outcome: Progressing     Problem: Dressings Lower Extremities  Goal: STG - Patient will complete lower body dressing with adaptive equipment and adhereing to precautions with mod I for pants and socks.   Outcome: Progressing     Problem: Toileting  Goal: STG - Patient will complete toileting tasks with sba with lrad including hygiene and clothing management.   Outcome: Progressing     Problem: Chronic Conditions and Co-morbidities  Goal: Patient's chronic conditions and co-morbidity symptoms are monitored and maintained or improved  Outcome: Progressing     Problem: Discharge Planning  Goal: Discharge to home or other facility with appropriate resources  Outcome: Progressing     Problem: Safety - Adult  Goal: Free from fall injury  Outcome: Progressing

## 2024-11-10 NOTE — CARE PLAN
Problem: Skin  Goal: Participates in plan/prevention/treatment measures  Outcome: Progressing  Flowsheets (Taken 11/9/2024 1945)  Participates in plan/prevention/treatment measures: Elevate heels     Problem: Bathing  Goal: STG - Patient will bathe body in seated position with set up for ub with supervision  Outcome: Progressing     Problem: Safety - Adult  Goal: Free from fall injury  Outcome: Progressing    The patient's goals for the shift include  get good sleep tonight    The clinical goals for the shift include Patient will maintain Spo2% >90 on 2L NC throughout end of shift    2100: Patient hypertensive tonight, Dr. Su messaged to inquire about restarting her Norvasc. BP meds originally on hold due to hypotension a few days ago. Coreg restarted today. Dr. Su restarted patient Norvasc.   2300: RT placed patient on CPAP  0030: Patient off CPAP per request    Patient having uneventful shift overall, patient Aox4, 2L NC (patient prefers to keep the NC on, however, patient oxygen saturation ok on RA), independent in the room. Patient having some L hip pain that was not alleviated with tylenol, provided with a 1 time order of oxy that helped alleviate pain. Patient denies any other complaints. Patient safety maintained.      pt with ssx of CHF, hypertensive in ED, noncompliant with meds  cbc, cmp, trop, pro-bnp, cxr, hydralzaine, lasix

## 2024-11-10 NOTE — PROGRESS NOTES
This note was created using voice recognition transcription software. Despite proofreading, unintentional typographical errors may be present. Please contact the GI office with any questions or concerns.       Subjective  11/8/2024 patient emotional.  Upper abdominal pain that has moved to lower abdomen.  Not passing gas today.  Last BM was yesterday and was a hard ball.  No prior issues with constipation.  11/10/2024 abdominal pain improved however not gone yet.  Last BM the day before.  No overt bleeding reported.  H&H stable.  Tolerates regular diet well.  Abdomen soft, minimally tender in lower regions more on left, bowel sounds present      Physical Exam:  General: Polite, calm, resting  Skin:  Warm and dry, no jaundice  HEENT: No scleral icterus, no conjunctival pallor, normocephalic, atraumatic, mucous membranes moist  Neck:  atraumatic, trachea midline, no JVD  Chest:  Clear to auscultation bilaterally. No wheezes, rales, or rhonchi  CV:  Regular rate and rhythm.  Positive S1/S2  Abdomen: no distension, +BS, soft,  minimally tender to palpation in lower abdomen, no rebound tenderness, no guarding, no rigidity, no discernible ascites   Extremities: no lower extremity edema, chronic pigmentary changes, no cyanosis  Neurological:  A&Ox3, no asterixis  Psychiatric: cooperative     Investigations:  Labs, radiological imaging and cardiac work up were reviewed        Objective:         11/9/2024    11:25 PM 11/9/2024    11:58 PM 11/10/2024    12:10 AM 11/10/2024     3:53 AM 11/10/2024     8:00 AM 11/10/2024    11:15 AM 11/10/2024     2:25 PM   Vitals   Systolic  178 142 168 165 180 176   Diastolic  87 80 84 85 84 91   Heart Rate  78 76 77 84 78 86   Temp  35.9 °C (96.6 °F)  35.9 °C (96.6 °F) 36.6 °C (97.9 °F) 36.3 °C (97.3 °F) 36.1 °C (97 °F)   Resp 19 18 18 20 16 18 17          Medications:  amLODIPine, 5 mg, oral, q PM  aspirin, 81 mg, oral, BID  atorvastatin, 40 mg, oral, Daily  carvedilol, 12.5 mg, oral,  BID  famotidine, 20 mg, oral, BID  folic acid, 1 mg, oral, Daily  gabapentin, 600 mg, oral, BID  heparin (porcine), 5,000 Units, subcutaneous, q8h  oxygen, , inhalation, Continuous - Inhalation  pantoprazole, 40 mg, intravenous, BID  polyethylene glycol, 17 g, oral, BID  [START ON 11/11/2024] predniSONE, 40 mg, oral, Daily  psyllium, 1 packet, oral, Daily         Recent Results (from the past 72 hours)   CBC    Collection Time: 11/07/24  4:15 PM   Result Value Ref Range    WBC 5.4 4.4 - 11.3 x10*3/uL    nRBC 0.0 0.0 - 0.0 /100 WBCs    RBC 2.70 (L) 4.00 - 5.20 x10*6/uL    Hemoglobin 8.3 (L) 12.0 - 16.0 g/dL    Hematocrit 27.7 (L) 36.0 - 46.0 %     (H) 80 - 100 fL    MCH 30.7 26.0 - 34.0 pg    MCHC 30.0 (L) 32.0 - 36.0 g/dL    RDW 14.2 11.5 - 14.5 %    Platelets 319 150 - 450 x10*3/uL   Blood Culture    Collection Time: 11/07/24  4:15 PM    Specimen: Peripheral Venipuncture; Blood culture   Result Value Ref Range    Blood Culture No growth at 2 days    Blood Culture    Collection Time: 11/07/24  4:15 PM    Specimen: Peripheral Venipuncture; Blood culture   Result Value Ref Range    Blood Culture No growth at 2 days    Comprehensive Metabolic Panel    Collection Time: 11/07/24  4:15 PM   Result Value Ref Range    Glucose 131 (H) 74 - 99 mg/dL    Sodium 136 136 - 145 mmol/L    Potassium 4.2 3.5 - 5.3 mmol/L    Chloride 105 98 - 107 mmol/L    Bicarbonate 23 21 - 32 mmol/L    Anion Gap 12 10 - 20 mmol/L    Urea Nitrogen 36 (H) 6 - 23 mg/dL    Creatinine 2.65 (H) 0.50 - 1.05 mg/dL    eGFR 18 (L) >60 mL/min/1.73m*2    Calcium 8.2 (L) 8.6 - 10.3 mg/dL    Albumin 2.9 (L) 3.4 - 5.0 g/dL    Alkaline Phosphatase 54 33 - 136 U/L    Total Protein 5.5 (L) 6.4 - 8.2 g/dL    AST 9 9 - 39 U/L    Bilirubin, Total 0.3 0.0 - 1.2 mg/dL    ALT 8 7 - 45 U/L   Lactate    Collection Time: 11/07/24  4:15 PM   Result Value Ref Range    Lactate 0.9 0.4 - 2.0 mmol/L   Iron and TIBC    Collection Time: 11/07/24  4:15 PM   Result Value Ref  Range    Iron 35 35 - 150 ug/dL    UIBC 198 110 - 370 ug/dL    TIBC 233 (L) 240 - 445 ug/dL    % Saturation 15 (L) 25 - 45 %   Reticulocytes    Collection Time: 11/07/24  4:15 PM   Result Value Ref Range    Retic % 1.7 0.5 - 2.0 %    Retic Absolute 0.046 0.017 - 0.110 x10*6/uL    Reticulocyte Hemoglobin 29 28 - 38 pg    Immature Retic fraction 13.0 <=16.0 %   Ferritin    Collection Time: 11/07/24  5:11 PM   Result Value Ref Range    Ferritin 245 (H) 8 - 150 ng/mL   Vitamin B12    Collection Time: 11/07/24  5:11 PM   Result Value Ref Range    Vitamin B12 407 211 - 911 pg/mL   Folate    Collection Time: 11/07/24  5:11 PM   Result Value Ref Range    Folate, Serum >24.0 >5.0 ng/mL   Type and screen    Collection Time: 11/07/24  5:11 PM   Result Value Ref Range    ABO TYPE O     Rh TYPE POS     ANTIBODY SCREEN NEG    Hemoglobin and Hematocrit, Blood    Collection Time: 11/07/24  8:04 PM   Result Value Ref Range    Hemoglobin 7.9 (L) 12.0 - 16.0 g/dL    Hematocrit 26.7 (L) 36.0 - 46.0 %   Lavender Top    Collection Time: 11/08/24  5:10 AM   Result Value Ref Range    Extra Tube Hold for add-ons.    CBC    Collection Time: 11/08/24  5:10 AM   Result Value Ref Range    WBC 5.2 4.4 - 11.3 x10*3/uL    nRBC 0.0 0.0 - 0.0 /100 WBCs    RBC 3.04 (L) 4.00 - 5.20 x10*6/uL    Hemoglobin 9.2 (L) 12.0 - 16.0 g/dL    Hematocrit 30.1 (L) 36.0 - 46.0 %    MCV 99 80 - 100 fL    MCH 30.3 26.0 - 34.0 pg    MCHC 30.6 (L) 32.0 - 36.0 g/dL    RDW 14.0 11.5 - 14.5 %    Platelets 395 150 - 450 x10*3/uL   Renal Function Panel    Collection Time: 11/08/24  5:12 AM   Result Value Ref Range    Glucose 154 (H) 74 - 99 mg/dL    Sodium 137 136 - 145 mmol/L    Potassium 4.2 3.5 - 5.3 mmol/L    Chloride 106 98 - 107 mmol/L    Bicarbonate 22 21 - 32 mmol/L    Anion Gap 13 10 - 20 mmol/L    Urea Nitrogen 32 (H) 6 - 23 mg/dL    Creatinine 1.84 (H) 0.50 - 1.05 mg/dL    eGFR 28 (L) >60 mL/min/1.73m*2    Calcium 8.9 8.6 - 10.3 mg/dL    Phosphorus 3.2 2.5 - 4.9  mg/dL    Albumin 3.5 3.4 - 5.0 g/dL   Renal Function Panel    Collection Time: 11/09/24  5:53 AM   Result Value Ref Range    Glucose 100 (H) 74 - 99 mg/dL    Sodium 139 136 - 145 mmol/L    Potassium 4.0 3.5 - 5.3 mmol/L    Chloride 109 (H) 98 - 107 mmol/L    Bicarbonate 23 21 - 32 mmol/L    Anion Gap 11 10 - 20 mmol/L    Urea Nitrogen 24 (H) 6 - 23 mg/dL    Creatinine 1.11 (H) 0.50 - 1.05 mg/dL    eGFR 52 (L) >60 mL/min/1.73m*2    Calcium 9.0 8.6 - 10.3 mg/dL    Phosphorus 2.5 2.5 - 4.9 mg/dL    Albumin 3.3 (L) 3.4 - 5.0 g/dL   CBC    Collection Time: 11/09/24  5:53 AM   Result Value Ref Range    WBC 7.0 4.4 - 11.3 x10*3/uL    nRBC 0.0 0.0 - 0.0 /100 WBCs    RBC 2.87 (L) 4.00 - 5.20 x10*6/uL    Hemoglobin 8.7 (L) 12.0 - 16.0 g/dL    Hematocrit 28.6 (L) 36.0 - 46.0 %     80 - 100 fL    MCH 30.3 26.0 - 34.0 pg    MCHC 30.4 (L) 32.0 - 36.0 g/dL    RDW 13.7 11.5 - 14.5 %    Platelets 384 150 - 450 x10*3/uL   CBC    Collection Time: 11/10/24  6:56 AM   Result Value Ref Range    WBC 6.4 4.4 - 11.3 x10*3/uL    nRBC 0.0 0.0 - 0.0 /100 WBCs    RBC 3.16 (L) 4.00 - 5.20 x10*6/uL    Hemoglobin 9.5 (L) 12.0 - 16.0 g/dL    Hematocrit 30.9 (L) 36.0 - 46.0 %    MCV 98 80 - 100 fL    MCH 30.1 26.0 - 34.0 pg    MCHC 30.7 (L) 32.0 - 36.0 g/dL    RDW 13.8 11.5 - 14.5 %    Platelets 419 150 - 450 x10*3/uL   Basic Metabolic Panel    Collection Time: 11/10/24  6:56 AM   Result Value Ref Range    Glucose 115 (H) 74 - 99 mg/dL    Sodium 139 136 - 145 mmol/L    Potassium 3.8 3.5 - 5.3 mmol/L    Chloride 107 98 - 107 mmol/L    Bicarbonate 25 21 - 32 mmol/L    Anion Gap 11 10 - 20 mmol/L    Urea Nitrogen 27 (H) 6 - 23 mg/dL    Creatinine 1.03 0.50 - 1.05 mg/dL    eGFR 56 (L) >60 mL/min/1.73m*2    Calcium 9.1 8.6 - 10.3 mg/dL          Assessment:  Anita Calabrese is a 73 yo Female with a PMH of CAD, HTN, HLD, TIA, GERD, COPD, and KAVEH not on CPAP, and RA who presented to [facility] on 11/6/24 with reports of n/v/epigastric pain/diarrhea.   GI was consulted for pain/worsening anemia.  Likely gastroenteritis with residual abdominal pain.  No reports of bleeding and patient denies.  Has constipation which is likely source of lower abdominal pain.      #Abdominal pain, improving  #Anemia  #Constipation    Abdominal pain appears to be improving.  Most likely improving gastroenteritis.    Trend Hgb, monitor for overt bleeding, transfuse as necessary.  H&H stable   Continue bowel regimen as prescribed, to be continued after discharge  Physical activities encouraged as tolerated    Outpatient follow-up with GI within 1 to 2 months of discharge unless needed sooner to discuss possibility of repeat colonoscopy.    Discussed patient with Dr. Esquivel.    GI to sign of at this time.   Please do not hesitate to reconsult/reach out with questions should any arise    I spent 30 minutes in the professional and overall care of this patient.      11/10/24 at 4:05 PM - TATA Hayden-CNP

## 2024-11-10 NOTE — PROGRESS NOTES
Anita Calabrese is a 74 y.o. female on day 3 of admission presenting with Hypoxia.      Subjective   Bps now going up, norvasc restarted overnight, Hgb stable, Cr improved     Objective     Last Recorded Vitals  /85 (BP Location: Left arm, Patient Position: Lying)   Pulse 84   Temp 36.6 °C (97.9 °F) (Temporal)   Resp 16   Wt 95.3 kg (210 lb)   SpO2 95%     Physical Exam    G: aox3, NAD, cooperative  HENT: neck supple, no JVD  Eyes: clear sclera  CV: RRR s1 s2  L: clear, no conversational dyspnea, no acc muscle use, no tachypnea  Abd: soft, NT, non distended  Ext: no c/c/e  N: no appreciable acute focal deficits  Psych: appropriate mood and behavior    Assessment/Plan  Acute hypoxic respiratory failure, chronic sob, AECOPD. KAVEH not on CPAP  Atypical CP, CAD  ALEXUS, probable BO  Asymptomatic hypotension - resolved  Worsening anemia (improved and now stable), epigastric pain, GERD  Recent L hip replacement, 10/24 ([facility])  Questionable recent cellulitis around incision site on PO abx     HTN, HLD  RA  DVT ppx  Full code     Plan:  - Renal function improved, cardiology following and now that renal function improved tentative plan for LHC on Tuesday, Bps improved and now starting to become high, resumed home coreg at lower dose yesterday, will increase to 12.5mg BID, norvasc resumed overnight, Hgb stable and ASA resumed  (was on BID dosing with recent hip replacement), continue statin,  - Continue scheduled duoneb, transition steroids to PO prednisone today, wean O2 as tolerated, may need home O2, pulmonary setting up home nebulizer machine, CPAP titration study  - Has had several recent contrast studies, nephrology consulted, renal function improved, off IVF, strict I/Os, continue to hold ARB/diuretics, now with improved CrCl can increase gabapentin back to home dose  - Intermittent epigastric pain but much improved, possibly reflux in nature, on BID PPI, also for now will continue home H2 blocker,CT and  RUQ US unremarkable, GI following, Hgb stable  - Site around recent hip replacement unremarkable, finished course of PO  - PT/OT yara Barajas, DO

## 2024-11-11 PROBLEM — I20.0 UNSTABLE ANGINA (MULTI): Status: ACTIVE | Noted: 2024-11-06

## 2024-11-11 LAB
ABO GROUP (TYPE) IN BLOOD: NORMAL
ALBUMIN SERPL BCP-MCNC: 3.3 G/DL (ref 3.4–5)
ANION GAP SERPL CALC-SCNC: 12 MMOL/L (ref 10–20)
ANTIBODY SCREEN: NORMAL
BACTERIA BLD CULT: NORMAL
BACTERIA BLD CULT: NORMAL
BUN SERPL-MCNC: 23 MG/DL (ref 6–23)
CALCIUM SERPL-MCNC: 9.1 MG/DL (ref 8.6–10.3)
CHLORIDE SERPL-SCNC: 106 MMOL/L (ref 98–107)
CO2 SERPL-SCNC: 25 MMOL/L (ref 21–32)
CREAT SERPL-MCNC: 1.08 MG/DL (ref 0.5–1.05)
EGFRCR SERPLBLD CKD-EPI 2021: 53 ML/MIN/1.73M*2
ERYTHROCYTE [DISTWIDTH] IN BLOOD BY AUTOMATED COUNT: 13.7 % (ref 11.5–14.5)
GLUCOSE SERPL-MCNC: 96 MG/DL (ref 74–99)
HCT VFR BLD AUTO: 32 % (ref 36–46)
HGB BLD-MCNC: 10.1 G/DL (ref 12–16)
HOLD SPECIMEN: NORMAL
HOLD SPECIMEN: NORMAL
MCH RBC QN AUTO: 30 PG (ref 26–34)
MCHC RBC AUTO-ENTMCNC: 31.6 G/DL (ref 32–36)
MCV RBC AUTO: 95 FL (ref 80–100)
NRBC BLD-RTO: 0 /100 WBCS (ref 0–0)
PHOSPHATE SERPL-MCNC: 3.2 MG/DL (ref 2.5–4.9)
PLATELET # BLD AUTO: 476 X10*3/UL (ref 150–450)
POTASSIUM SERPL-SCNC: 4.1 MMOL/L (ref 3.5–5.3)
RBC # BLD AUTO: 3.37 X10*6/UL (ref 4–5.2)
RH FACTOR (ANTIGEN D): NORMAL
SODIUM SERPL-SCNC: 139 MMOL/L (ref 136–145)
WBC # BLD AUTO: 7.7 X10*3/UL (ref 4.4–11.3)

## 2024-11-11 PROCEDURE — 36415 COLL VENOUS BLD VENIPUNCTURE: CPT | Performed by: STUDENT IN AN ORGANIZED HEALTH CARE EDUCATION/TRAINING PROGRAM

## 2024-11-11 PROCEDURE — 86901 BLOOD TYPING SEROLOGIC RH(D): CPT | Performed by: STUDENT IN AN ORGANIZED HEALTH CARE EDUCATION/TRAINING PROGRAM

## 2024-11-11 PROCEDURE — 2500000001 HC RX 250 WO HCPCS SELF ADMINISTERED DRUGS (ALT 637 FOR MEDICARE OP): Performed by: STUDENT IN AN ORGANIZED HEALTH CARE EDUCATION/TRAINING PROGRAM

## 2024-11-11 PROCEDURE — 2500000004 HC RX 250 GENERAL PHARMACY W/ HCPCS (ALT 636 FOR OP/ED): Performed by: NURSE PRACTITIONER

## 2024-11-11 PROCEDURE — 2500000004 HC RX 250 GENERAL PHARMACY W/ HCPCS (ALT 636 FOR OP/ED): Performed by: STUDENT IN AN ORGANIZED HEALTH CARE EDUCATION/TRAINING PROGRAM

## 2024-11-11 PROCEDURE — 99233 SBSQ HOSP IP/OBS HIGH 50: CPT

## 2024-11-11 PROCEDURE — 97530 THERAPEUTIC ACTIVITIES: CPT | Mod: GP,CQ

## 2024-11-11 PROCEDURE — 97116 GAIT TRAINING THERAPY: CPT | Mod: GP,CQ

## 2024-11-11 PROCEDURE — 80069 RENAL FUNCTION PANEL: CPT | Performed by: INTERNAL MEDICINE

## 2024-11-11 PROCEDURE — 2500000005 HC RX 250 GENERAL PHARMACY W/O HCPCS: Performed by: STUDENT IN AN ORGANIZED HEALTH CARE EDUCATION/TRAINING PROGRAM

## 2024-11-11 PROCEDURE — 99232 SBSQ HOSP IP/OBS MODERATE 35: CPT | Performed by: STUDENT IN AN ORGANIZED HEALTH CARE EDUCATION/TRAINING PROGRAM

## 2024-11-11 PROCEDURE — 2060000001 HC INTERMEDIATE ICU ROOM DAILY

## 2024-11-11 PROCEDURE — 85027 COMPLETE CBC AUTOMATED: CPT | Performed by: STUDENT IN AN ORGANIZED HEALTH CARE EDUCATION/TRAINING PROGRAM

## 2024-11-11 RX ORDER — AMLODIPINE BESYLATE 5 MG/1
5 TABLET ORAL EVERY EVENING
Status: DISCONTINUED | OUTPATIENT
Start: 2024-11-11 | End: 2024-11-13 | Stop reason: HOSPADM

## 2024-11-11 RX ORDER — LOSARTAN POTASSIUM 25 MG/1
25 TABLET ORAL DAILY
Status: DISCONTINUED | OUTPATIENT
Start: 2024-11-11 | End: 2024-11-12

## 2024-11-11 RX ORDER — SODIUM CHLORIDE 9 MG/ML
100 INJECTION, SOLUTION INTRAVENOUS CONTINUOUS
Status: ACTIVE | OUTPATIENT
Start: 2024-11-11 | End: 2024-11-12

## 2024-11-11 RX ORDER — AMLODIPINE BESYLATE 10 MG/1
10 TABLET ORAL EVERY EVENING
Status: DISCONTINUED | OUTPATIENT
Start: 2024-11-11 | End: 2024-11-11

## 2024-11-11 RX ORDER — CARVEDILOL 25 MG/1
25 TABLET ORAL
Status: DISCONTINUED | OUTPATIENT
Start: 2024-11-11 | End: 2024-11-13 | Stop reason: HOSPADM

## 2024-11-11 ASSESSMENT — COGNITIVE AND FUNCTIONAL STATUS - GENERAL
MOVING TO AND FROM BED TO CHAIR: A LITTLE
MOBILITY SCORE: 20
WALKING IN HOSPITAL ROOM: A LITTLE
MOBILITY SCORE: 19
STANDING UP FROM CHAIR USING ARMS: A LITTLE
TOILETING: A LITTLE
TURNING FROM BACK TO SIDE WHILE IN FLAT BAD: A LITTLE
MOBILITY SCORE: 21
WALKING IN HOSPITAL ROOM: A LITTLE
STANDING UP FROM CHAIR USING ARMS: A LITTLE
WALKING IN HOSPITAL ROOM: A LITTLE
DAILY ACTIVITIY SCORE: 22
CLIMB 3 TO 5 STEPS WITH RAILING: A LITTLE
DAILY ACTIVITIY SCORE: 23
CLIMB 3 TO 5 STEPS WITH RAILING: A LITTLE
DRESSING REGULAR UPPER BODY CLOTHING: A LITTLE
CLIMB 3 TO 5 STEPS WITH RAILING: A LITTLE
TOILETING: A LITTLE
MOVING TO AND FROM BED TO CHAIR: A LITTLE
STANDING UP FROM CHAIR USING ARMS: A LITTLE

## 2024-11-11 ASSESSMENT — PAIN SCALES - GENERAL
PAINLEVEL_OUTOF10: 5 - MODERATE PAIN
PAINLEVEL_OUTOF10: 6
PAINLEVEL_OUTOF10: 0 - NO PAIN
PAINLEVEL_OUTOF10: 8
PAINLEVEL_OUTOF10: 0 - NO PAIN
PAINLEVEL_OUTOF10: 0 - NO PAIN

## 2024-11-11 ASSESSMENT — PAIN DESCRIPTION - LOCATION
LOCATION: HIP
LOCATION: HIP

## 2024-11-11 ASSESSMENT — PAIN - FUNCTIONAL ASSESSMENT
PAIN_FUNCTIONAL_ASSESSMENT: 0-10

## 2024-11-11 ASSESSMENT — PAIN DESCRIPTION - ORIENTATION
ORIENTATION: LEFT
ORIENTATION: LEFT

## 2024-11-11 NOTE — PROGRESS NOTES
NEPHROLOGY PROGRESS NOTE    REASON FOR CONSULT: ALEXUS    SUBJECTIVE:    Patient feels okay.  She is going for cardiac catheterization tomorrow.  Blood pressure has been elevated.  No constipation.  At home she was only taking losartan and hydrochlorothiazide.  Here she is on Coreg and amlodipine.    OBJECTIVE:    Visit Vitals  BP (!) 204/99   Pulse 83   Temp 36.5 °C (97.7 °F)   Resp 18        No intake or output data in the 24 hours ending 11/11/24 0902       General: Awake and Alert, In no distress, Cooperative  HEENT: Oral mucosa moist, EOMI  NECK: Supple  CHEST: No crackles, no wheeze, no tachypnea  CVS: S1,S2 heard, no rubs, RRR  ABD: Soft, no tenderness, BS present  EXT: no edema,  Left hip tenderness    MEDICATION:    Scheduled medications  amLODIPine, 5 mg, oral, q PM  aspirin, 81 mg, oral, BID  atorvastatin, 40 mg, oral, Daily  carvedilol, 25 mg, oral, BID  famotidine, 20 mg, oral, BID  folic acid, 1 mg, oral, Daily  gabapentin, 600 mg, oral, BID  heparin (porcine), 5,000 Units, subcutaneous, q8h  oxygen, , inhalation, Continuous - Inhalation  pantoprazole, 40 mg, intravenous, BID  polyethylene glycol, 17 g, oral, BID  predniSONE, 40 mg, oral, Daily  psyllium, 1 packet, oral, Daily      Continuous medications       PRN medications  PRN medications: acetaminophen, ipratropium-albuteroL, ondansetron, oxyCODONE, oxygen     RESULTS:    Lab Results   Component Value Date    WBC 7.7 11/11/2024    HGB 10.1 (L) 11/11/2024    HCT 32.0 (L) 11/11/2024    MCV 95 11/11/2024     (H) 11/11/2024        Lab Results   Component Value Date    CREATININE 1.08 (H) 11/11/2024    BUN 23 11/11/2024     11/11/2024    K 4.1 11/11/2024     11/11/2024    CO2 25 11/11/2024        Radiology Imaging reviewed      ASSESSMENT/PLAN:     1.  ALEXUS: Patient's renal function has improved with creatinine at 1.08.  Peak creatinine of 2.65.  Patient with contrast associated nephropathy.  Holding her thiazide diuretic and losartan  due to the recent ALEXUS and her blood pressure was soft.  She is going for cardiac catheterization tomorrow and should get IV normal saline pre and postcontrast exposure.  She can eventually be resumed on her thiazide diuretic and losartan prior to discharge home.    2.  Hypertension: Blood pressure is elevated off her losartan and thiazide diuretic.  She is on amlodipine 5 mg daily and her Coreg dose was increased.  Prior to discharge she can be resumed on the losartan and HCTZ to get her back on her home blood pressure regimen.    Thank You very much for allowing me to participate in the care of this Patient    This document was created using dragon dictation and may contain unintended error    Ernie Ames MD   11/11/24

## 2024-11-11 NOTE — DOCUMENTATION CLARIFICATION NOTE
"    PATIENT:               MAURICE LUCAS  ACCT #:                  [id number]  MRN:                       [id number]  :                       1950  ADMIT DATE:       2024 5:13 AM  DISCH DATE:  RESPONDING PROVIDER #:        [id number]          PROVIDER RESPONSE TEXT:    Patient treated for Gastroenteritis without Sepsis    CDI QUERY TEXT:    Clarification    Risk Factors:    Instruction:  Based on your assessment of the patient and the clinical information, please provide the requested documentation by clicking on the appropriate radio button and enter any additional information if prompted.    Question: Is there a diagnosis indicative of a patient meeting SIRS criteria and with organ dysfunction in the setting of CDI TO ENTER infection    When answering this query, please exercise your independent professional judgment. The fact that a question is being asked, does not imply that any particular answer is desired or expected.    The patient's clinical indicators include:  Clinical Information: 73 yo presenting with N/V, abdominal pain    Clinical Indicators: ED  \"She is requiring oxygen now, which is new. 2 to 3 L. Hypoxic without it\"    H and P  \"She states she woke up this morning with n/v and mid epigastric pain which was her primary reason for coming to the ED. She did have an episode of diarrhea yesterday which  has since resolved.\", \"Epigastric pain, n/v GERD ALEXUS\", \"IVF, CT without obstruction, obtain PVR, hold home ARB and diuretics, renally dose home gabapentin\"    24   HR 80-97    RR 16-26   Creatinine 1.79    SpO2 88%  24 Creatinine 2.06-2.65    24 pn  [place]  \"Most likely patient experienced mild transient viral gastroenteritis which appears to be resolved but lower abdominal pain still lingering    11/10/24  PN [place]  \"Likely gastroenteritis with residual abdominal pain\", \"Abdominal pain appears to be improving.  Most likely improving gastroenteritis\"    Treatment:   " Nephrology consult, IV NS bolus 1000 mls, IV NS bolus 500 mls,  Renal dose medications, lab monitoring, supplemental oxygen, Pepcid, IV Zofran,    Risk Factors: 75 yo with HTN, contrast exposure x 3, CAD  Options provided:  -- Sepsis with renal organ dysfunction of - ALEXUS  -- Sepsis with respiratory organ dysfunction of Acute on chronic respiratory failure with hypoxia  -- Sepsis with multi-system organ dysfunction of renal organ dysfunction - ALEXUS, respiratory organ dysfunction or Acute on chronic respiratory failure with hyposia  -- Sepsis with other organ dysfunction, Please specify sepsis associated organ dysfunction below  -- Patient treated for Gastroenteritis without Sepsis  -- Other - I will add my own diagnosis  -- Refer to Clinical Documentation Reviewer    Query created by: Dian Goins on 11/10/2024 6:09 PM      Electronically signed by:  GEOFF HARI DO 11/11/2024 7:45 AM

## 2024-11-11 NOTE — PROGRESS NOTES
Physical Therapy    Physical Therapy Treatment    Patient Name: Anita Calabrese  MRN: [id number]  Department: [facility]  Room: [address] [place], [place] [address]  Today's Date: 11/11/2024  Time Calculation  Start Time: 0950  Stop Time: 1015  Time Calculation (min): 25 min         Assessment/Plan   PT Assessment  End of Session Communication: Bedside nurse  End of Session Patient Position: Up in room, Alarm off, not on at start of session (Pt seated EOB, call bell in reach.)     PT Plan  Treatment/Interventions: Bed mobility, Transfer training, Gait training  PT Plan: Ongoing PT  PT Frequency: 3 times per week  PT Discharge Recommendations: Low intensity level of continued care  PT - OK to Discharge: Yes      General Visit Information:   PT  Visit  PT Received On: 11/11/24  General  Prior to Session Communication: Bedside nurse  Patient Position Received: Bed, 2 rail up, Alarm off, not on at start of session  General Comment: Pt pleasant and willing to participate in PT sesssion.    Subjective   Precautions:  Precautions  LE Weight Bearing Status: Weight Bearing as Tolerated  Medical Precautions: Fall precautions  Post-Surgical Precautions: Left hip precautions    Vital Signs (Past 2hrs)        Date/Time Vitals Session Patient Position Pulse Resp SpO2 BP MAP (mmHg)    11/11/24 0856 --  --  --  --  96 %  --  --                         Objective   Pain:  Pain Assessment  Pain Assessment: 0-10  0-10 (Numeric) Pain Score: 8  Pain Type: Surgical pain  Pain Location: Hip  Pain Orientation: Left  Pain Interventions: Medication (See MAR)              Treatments:  Bed Mobility  Bed Mobility: Yes (Pt performed sup>sit with Supervision.)    Ambulation/Gait Training  Ambulation/Gait Training Performed: Yes (Pt able to amb +100' with FWW and CGA; demos slow, steady aaron with reciprocal gait pattern.  No dizziness reported or LOB noted.)  Transfers  Transfer: Yes (Pt performed sit<>stand from EOB with FWW and SBA; provided Min v/c for proper hand  placement safety.)    Outcome Measures:  [facility] Basic Mobility  Turning from your back to your side while in a flat bed without using bedrails: None  Moving from lying on your back to sitting on the side of a flat bed without using bedrails: A little  Moving to and from bed to chair (including a wheelchair): A little  Standing up from a chair using your arms (e.g. wheelchair or bedside chair): A little  To walk in hospital room: A little  Climbing 3-5 steps with railing: A little  Basic Mobility - Total Score: 19    Education Documentation  No documentation found.  Education Comments  No comments found.        OP EDUCATION:       Encounter Problems       Encounter Problems (Active)       PT Problem       STG - Pt will transition supine <> sitting with mod I  (Progressing)       Start:  11/07/24    Expected End:  11/21/24            STG - Pt will transfer STS with mod I  (Progressing)       Start:  11/07/24    Expected End:  11/21/24            STG - Pt will amb 50' using LRD with SUP  (Progressing)       Start:  11/07/24    Expected End:  11/21/24               Pain - Adult

## 2024-11-11 NOTE — PROGRESS NOTES
Anita Calabrese is a 74 y.o. female on day 4 of admission presenting with Hypoxia.      Subjective   Bps now going up, her coreg back to home dose this AM     Objective     Last Recorded Vitals  /86   Pulse 81   Temp 36.5 °C (97.7 °F)   Resp 18   Wt 95.3 kg (210 lb)   SpO2 92%     Physical Exam    G: aox3, NAD, cooperative  HENT: neck supple, no JVD  Eyes: clear sclera  CV: RRR s1 s2  L: clear, no conversational dyspnea, no acc muscle use, no tachypnea  Abd: soft, NT, non distended  Ext: no c/c/e  N: no appreciable acute focal deficits  Psych: appropriate mood and behavior    Assessment/Plan  Acute hypoxic respiratory failure, chronic sob, AECOPD. KAVEH not on CPAP  Atypical CP, CAD  ALEXUS, probable BO  Asymptomatic hypotension - resolved  Worsening anemia (improved and now stable), epigastric pain, GERD  Recent L hip replacement, 10/24 ([facility])  Questionable recent cellulitis around incision site on PO abx     HTN, HLD  RA  DVT ppx  Full code     Plan:  - Renal function improved, cardiology following and now that renal function improved plan for LHC tomorrow Bps improved and now starting to become high, increase coreg back to home 25mg BID give first dose now, continue norvasc, Hgb stable and ASA resumed  (was on BID dosing with recent hip replacement), continue statin, cardiology ordered low dose ARB, but will place on this hold given just recovered from ALEXUS/BO and will be receiving contrast again tomorrow.   - Continue scheduled duoneb, stop prednisone today, on RA at time of exam, pulmonary setting up home nebulizer machine, CPAP titration study  - Has had several recent contrast studies, nephrology consulted, renal function improved, off IVF, strict I/Os, continue to hold ARB/diuretics, back on home dose gabapentin, will start IVF NS around midnight tomorrow to help reduce chance of BO with LHC  tomorrow  - Intermittent epigastric pain resolved possibly reflux in nature, on BID PPI, also for now  will continue home H2 blocker,CT and RUQ US unremarkable, GI following, Hgb stable  - Site around recent hip replacement unremarkable, finished course of PO  - PT/OT yara Barajas, DO

## 2024-11-11 NOTE — PROGRESS NOTES
Anita Calabrese is a 74 y.o. female on day 4 of admission presenting with Hypoxia.      Assessment / Plan        Reason for cardiology consult: Atypical chest pain    75 yo F with PMHx of CAD, HFpEF (echo 3/15/2024 EF 55 to 60%), HTN, TIA, Former 40 pack year smoker, GERD, KAVEH not on cpap, presents with n/v and mid epigastric pain upon waking up in the morning. In addition, she states she has had intermittent mid sternal non radiating chest pressure at rest over the past two days. She has chronic sob and in the ED was found to be hypoxic requiring 2 L NC at rest. She was just discharged from [facility] on [facility]/3 after being admitted with atypical CP, palpitations, and uncontrolled HTN. She was seen by cardiology, BP regimen adjusted with adding carvedilol, and cleared for discharge. At time of exam she is currently CP free and epigastric pain has spontaneously improved.  Patient's vitals were relatively unremarkable in ED.  Creatinine was elevated at 2.06.  Troponins 9, 8.  CT chest/abd/pelvis grossly unremarkable.  EKG was not remarkable for any acute ischemic changes.      #CAD (extensive coronary calcifications on CT)  #Hypertension  #Rule out atypical chest pain  # Lower abdominal pain  #Rule out ACS  #HFpEF  -Last echo on 3/15/2024 showed EF of 55 to 60%, no regional wall abnormalities, there was impaired relaxation pattern of LV diastolic filling  -Troponins flat at 9, 8  Plan:  -Continue Lipitor 40  -plan for cath on Tuesday 11/12  -Amlodipine 5  -Aspirin 81  -Heparin  -Coreg 25 twice daily  -Holding losartan. Creatinine elevated at 1.08 on 11/11                 Jayant Howard DO   PGY-1, Internal Medicine  This is a preliminary note, please await attending attestation for final A/P    Subjective   No chest pain or shortness of breath.  No acute issues overnight    Objective       Physical Exam:  General:  Pleasant and cooperative. No apparent distress.  HEENT:  Normocephalic, atraumatic  Chest:  Clear to  "auscultation bilaterally. No wheezes, rales, or rhonchi.  CV:  Regular rate and rhythm. No murmurs    Abdomen: Lower abdominal pain  Extremities:  No lower extremity edema or cyanosis.   Neurological:  AAOx3. No focal deficits.  Skin:  Warm and dry.    Last Recorded Vitals  Blood pressure (!) 204/99, pulse 83, temperature 36.5 °C (97.7 °F), resp. rate 18, height 1.676 m (5' 6\"), weight 95.3 kg (210 lb), SpO2 94%.  Intake/Output last 3 Shifts:  I/O last 3 completed shifts:  In: 500 (5.2 mL/kg) [P.O.:500]  Out: - (0 mL/kg)   Weight: 95.3 kg     Last CBC & BMP  Lab Results   Component Value Date    GLUCOSE 96 11/11/2024    CALCIUM 9.1 11/11/2024     11/11/2024    K 4.1 11/11/2024    CO2 25 11/11/2024     11/11/2024    BUN 23 11/11/2024    CREATININE 1.08 (H) 11/11/2024     Lab Results   Component Value Date    WBC 7.7 11/11/2024    HGB 10.1 (L) 11/11/2024    HCT 32.0 (L) 11/11/2024    MCV 95 11/11/2024     (H) 11/11/2024 November 9, 2024  CAD (extensive coronary calcifications on CT)  #Hypertension  #Rule out atypical chest pain  # Lower abdominal pain  #Rule out ACS  #HFpEF    Patient creatinine is slowly improving.  Will continue to monitor hemoglobin as well.  Plan for left heart catheterization and coronary angiography on Tuesday as long as clinically improving.  Patient is in agreement.       "

## 2024-11-11 NOTE — DOCUMENTATION CLARIFICATION NOTE
"    PATIENT:               MAURICE LUCAS  ACCT #:                  [id number]  MRN:                       [id number]  :                       1950  ADMIT DATE:       2024 5:13 AM  DISCH DATE:  RESPONDING PROVIDER #:        [id number]          PROVIDER RESPONSE TEXT:    Acute kidney injury without acute tubular necrosis    CDI QUERY TEXT:    Clarification    Instruction:    Based on your assessment of the patient and the clinical information, please provide the requested documentation by clicking on the appropriate radio button and enter any additional information if prompted.    Question: Please further clarify the diagnosis of acute kidney injury as    When answering this query, please exercise your independent professional judgment. The fact that a question is being asked, does not imply that any particular answer is desired or expected.    The patient's clinical indicators include:  Clinical Information: 75 yo presenting with N/V, abdominal pain    Clinical Indicators: H and P  \"Epigastric pain, n/v GERD ALEXUS\", \"IVF, CT without obstruction, obtain PVR, hold home ARB and diuretics, renally dose home gabapentin\"    24 Nephrology consult  \"There was a suspicion for a renal mass which required a CT abdomen with contrast and it revealed bilateral simple renal cyst.  She had a creatinine of 0.9 on admission during her last time and when she got discharged creatinine had gone up to 1.2\", \"Creatinine has gone up to 2.06 and thereby nephrology consultation is requested\", \" ALEXUS: This is secondary to contrast nephropathy.  Patient had a creatinine of 1.2 at time of discharge yesterday and when she came in this admission creatinine was around 1.7 and it has gone up to 2.1.  She has been exposed to contrast thrice.  Twice during her last admission for a CTA chest and also CT contrast for the abdomen and then subsequently she got a CTA chest this admission.  She has noticed some drop in urine output\"    24 " Creatinine 1.79  11/7/24 Creatinine 2.06-2.65  11/8/24 Creatinine 1.84  11/9/24 Creatinine 1.11    Treatment: Nephrology consult, IV NS bolus 1000 mls, IV NS bolus 500 mls,  Renal dose medications, lab monitoring, supplemental oxygen    Risk Factors: 75 yo with HTN, contrast exposure x 3, CAD  Options provided:  -- Acute kidney injury with acute tubular necrosis  -- Acute kidney injury without acute tubular necrosis  -- Other - I will add my own diagnosis  -- Refer to Clinical Documentation Reviewer    Query created by: Dian Goins on 11/10/2024 5:45 PM      Electronically signed by:  GEOFF HAIR DO 11/11/2024 7:45 AM

## 2024-11-11 NOTE — PROGRESS NOTES
Dispo planning for home with resume A [facility].Chart reviewed, patient going for cardiac cath tomorrow.  Updates sent via careport. TCC to continue to follow for discharge planning.     JACKELYN MALDONADO RN TCC

## 2024-11-11 NOTE — CARE PLAN
Problem: Skin  Goal: Participates in plan/prevention/treatment measures  Outcome: Progressing  Flowsheets (Taken 11/10/2024 1942)  Participates in plan/prevention/treatment measures: Elevate heels     Problem: Pain - Adult  Goal: Verbalizes/displays adequate comfort level or baseline comfort level  Outcome: Progressing     Problem: Safety - Adult  Goal: Free from fall injury  Outcome: Progressing     Problem: Chronic Conditions and Co-morbidities  Goal: Patient's chronic conditions and co-morbidity symptoms are monitored and maintained or improved  Outcome: Progressing    The patient's goals for the shift include  get good sleep tonight    The clinical goals for the shift include Patient to maintain Spo2% > 90% on RA    Patient had uneventful shift overall. Patient Aox4, RA, independent. Patient denies any SOB or CP. Patient planning to have heart cath on Tuesday. VSS, BP on the higher end, still adjusting medications for HTN. Patient free of any complaints, safety maintained.

## 2024-11-12 LAB
ALBUMIN SERPL BCP-MCNC: 3.1 G/DL (ref 3.4–5)
ANION GAP SERPL CALC-SCNC: 11 MMOL/L (ref 10–20)
BUN SERPL-MCNC: 24 MG/DL (ref 6–23)
CALCIUM SERPL-MCNC: 8.7 MG/DL (ref 8.6–10.3)
CHLORIDE SERPL-SCNC: 107 MMOL/L (ref 98–107)
CO2 SERPL-SCNC: 25 MMOL/L (ref 21–32)
CREAT SERPL-MCNC: 1.08 MG/DL (ref 0.5–1.05)
EGFRCR SERPLBLD CKD-EPI 2021: 53 ML/MIN/1.73M*2
ERYTHROCYTE [DISTWIDTH] IN BLOOD BY AUTOMATED COUNT: 13.8 % (ref 11.5–14.5)
GLUCOSE SERPL-MCNC: 103 MG/DL (ref 74–99)
HCT VFR BLD AUTO: 29.8 % (ref 36–46)
HGB BLD-MCNC: 9.5 G/DL (ref 12–16)
MCH RBC QN AUTO: 30.4 PG (ref 26–34)
MCHC RBC AUTO-ENTMCNC: 31.9 G/DL (ref 32–36)
MCV RBC AUTO: 96 FL (ref 80–100)
NRBC BLD-RTO: 0.2 /100 WBCS (ref 0–0)
PHOSPHATE SERPL-MCNC: 3.2 MG/DL (ref 2.5–4.9)
PLATELET # BLD AUTO: 435 X10*3/UL (ref 150–450)
POTASSIUM SERPL-SCNC: 4.5 MMOL/L (ref 3.5–5.3)
RBC # BLD AUTO: 3.12 X10*6/UL (ref 4–5.2)
SODIUM SERPL-SCNC: 138 MMOL/L (ref 136–145)
WBC # BLD AUTO: 8.2 X10*3/UL (ref 4.4–11.3)

## 2024-11-12 PROCEDURE — 2720000007 HC OR 272 NO HCPCS: Performed by: STUDENT IN AN ORGANIZED HEALTH CARE EDUCATION/TRAINING PROGRAM

## 2024-11-12 PROCEDURE — 2500000001 HC RX 250 WO HCPCS SELF ADMINISTERED DRUGS (ALT 637 FOR MEDICARE OP): Performed by: NURSE PRACTITIONER

## 2024-11-12 PROCEDURE — 80069 RENAL FUNCTION PANEL: CPT | Performed by: INTERNAL MEDICINE

## 2024-11-12 PROCEDURE — 2500000001 HC RX 250 WO HCPCS SELF ADMINISTERED DRUGS (ALT 637 FOR MEDICARE OP): Performed by: STUDENT IN AN ORGANIZED HEALTH CARE EDUCATION/TRAINING PROGRAM

## 2024-11-12 PROCEDURE — 36415 COLL VENOUS BLD VENIPUNCTURE: CPT | Performed by: STUDENT IN AN ORGANIZED HEALTH CARE EDUCATION/TRAINING PROGRAM

## 2024-11-12 PROCEDURE — 99152 MOD SED SAME PHYS/QHP 5/>YRS: CPT | Performed by: STUDENT IN AN ORGANIZED HEALTH CARE EDUCATION/TRAINING PROGRAM

## 2024-11-12 PROCEDURE — 94762 N-INVAS EAR/PLS OXIMTRY CONT: CPT

## 2024-11-12 PROCEDURE — 4A023N7 MEASUREMENT OF CARDIAC SAMPLING AND PRESSURE, LEFT HEART, PERCUTANEOUS APPROACH: ICD-10-PCS | Performed by: STUDENT IN AN ORGANIZED HEALTH CARE EDUCATION/TRAINING PROGRAM

## 2024-11-12 PROCEDURE — C1887 CATHETER, GUIDING: HCPCS | Performed by: STUDENT IN AN ORGANIZED HEALTH CARE EDUCATION/TRAINING PROGRAM

## 2024-11-12 PROCEDURE — 93454 CORONARY ARTERY ANGIO S&I: CPT | Performed by: STUDENT IN AN ORGANIZED HEALTH CARE EDUCATION/TRAINING PROGRAM

## 2024-11-12 PROCEDURE — 2500000005 HC RX 250 GENERAL PHARMACY W/O HCPCS: Performed by: STUDENT IN AN ORGANIZED HEALTH CARE EDUCATION/TRAINING PROGRAM

## 2024-11-12 PROCEDURE — C1769 GUIDE WIRE: HCPCS | Performed by: STUDENT IN AN ORGANIZED HEALTH CARE EDUCATION/TRAINING PROGRAM

## 2024-11-12 PROCEDURE — C1894 INTRO/SHEATH, NON-LASER: HCPCS | Performed by: STUDENT IN AN ORGANIZED HEALTH CARE EDUCATION/TRAINING PROGRAM

## 2024-11-12 PROCEDURE — 99232 SBSQ HOSP IP/OBS MODERATE 35: CPT | Performed by: STUDENT IN AN ORGANIZED HEALTH CARE EDUCATION/TRAINING PROGRAM

## 2024-11-12 PROCEDURE — B2111ZZ FLUOROSCOPY OF MULTIPLE CORONARY ARTERIES USING LOW OSMOLAR CONTRAST: ICD-10-PCS | Performed by: STUDENT IN AN ORGANIZED HEALTH CARE EDUCATION/TRAINING PROGRAM

## 2024-11-12 PROCEDURE — 2550000001 HC RX 255 CONTRASTS: Performed by: STUDENT IN AN ORGANIZED HEALTH CARE EDUCATION/TRAINING PROGRAM

## 2024-11-12 PROCEDURE — 2500000004 HC RX 250 GENERAL PHARMACY W/ HCPCS (ALT 636 FOR OP/ED): Performed by: NURSE PRACTITIONER

## 2024-11-12 PROCEDURE — 85027 COMPLETE CBC AUTOMATED: CPT | Performed by: STUDENT IN AN ORGANIZED HEALTH CARE EDUCATION/TRAINING PROGRAM

## 2024-11-12 PROCEDURE — 86902 BLOOD TYPE ANTIGEN DONOR EA: CPT

## 2024-11-12 PROCEDURE — C1760 CLOSURE DEV, VASC: HCPCS | Performed by: STUDENT IN AN ORGANIZED HEALTH CARE EDUCATION/TRAINING PROGRAM

## 2024-11-12 PROCEDURE — 2060000001 HC INTERMEDIATE ICU ROOM DAILY

## 2024-11-12 PROCEDURE — 2500000004 HC RX 250 GENERAL PHARMACY W/ HCPCS (ALT 636 FOR OP/ED): Mod: JZ | Performed by: STUDENT IN AN ORGANIZED HEALTH CARE EDUCATION/TRAINING PROGRAM

## 2024-11-12 PROCEDURE — 99233 SBSQ HOSP IP/OBS HIGH 50: CPT | Performed by: STUDENT IN AN ORGANIZED HEALTH CARE EDUCATION/TRAINING PROGRAM

## 2024-11-12 PROCEDURE — 7100000010 HC PHASE TWO TIME - EACH INCREMENTAL 1 MINUTE: Performed by: STUDENT IN AN ORGANIZED HEALTH CARE EDUCATION/TRAINING PROGRAM

## 2024-11-12 PROCEDURE — 7100000009 HC PHASE TWO TIME - INITIAL BASE CHARGE: Performed by: STUDENT IN AN ORGANIZED HEALTH CARE EDUCATION/TRAINING PROGRAM

## 2024-11-12 PROCEDURE — 2780000003 HC OR 278 NO HCPCS: Performed by: STUDENT IN AN ORGANIZED HEALTH CARE EDUCATION/TRAINING PROGRAM

## 2024-11-12 RX ORDER — LOSARTAN POTASSIUM 25 MG/1
25 TABLET ORAL DAILY
Status: DISCONTINUED | OUTPATIENT
Start: 2024-11-12 | End: 2024-11-13 | Stop reason: HOSPADM

## 2024-11-12 RX ORDER — NITROGLYCERIN 40 MG/100ML
INJECTION INTRAVENOUS AS NEEDED
Status: DISCONTINUED | OUTPATIENT
Start: 2024-11-12 | End: 2024-11-12 | Stop reason: HOSPADM

## 2024-11-12 RX ORDER — HEPARIN SODIUM 1000 [USP'U]/ML
INJECTION, SOLUTION INTRAVENOUS; SUBCUTANEOUS AS NEEDED
Status: DISCONTINUED | OUTPATIENT
Start: 2024-11-12 | End: 2024-11-12 | Stop reason: HOSPADM

## 2024-11-12 RX ORDER — ATORVASTATIN CALCIUM 80 MG/1
80 TABLET, FILM COATED ORAL DAILY
Status: DISCONTINUED | OUTPATIENT
Start: 2024-11-13 | End: 2024-11-13 | Stop reason: HOSPADM

## 2024-11-12 RX ORDER — VERAPAMIL HYDROCHLORIDE 2.5 MG/ML
INJECTION, SOLUTION INTRAVENOUS AS NEEDED
Status: DISCONTINUED | OUTPATIENT
Start: 2024-11-12 | End: 2024-11-12 | Stop reason: HOSPADM

## 2024-11-12 RX ORDER — LIDOCAINE HYDROCHLORIDE 20 MG/ML
INJECTION, SOLUTION INFILTRATION; PERINEURAL AS NEEDED
Status: DISCONTINUED | OUTPATIENT
Start: 2024-11-12 | End: 2024-11-12 | Stop reason: HOSPADM

## 2024-11-12 RX ORDER — MIDAZOLAM HYDROCHLORIDE 1 MG/ML
INJECTION, SOLUTION INTRAMUSCULAR; INTRAVENOUS AS NEEDED
Status: DISCONTINUED | OUTPATIENT
Start: 2024-11-12 | End: 2024-11-12 | Stop reason: HOSPADM

## 2024-11-12 RX ORDER — FENTANYL CITRATE 50 UG/ML
INJECTION, SOLUTION INTRAMUSCULAR; INTRAVENOUS AS NEEDED
Status: DISCONTINUED | OUTPATIENT
Start: 2024-11-12 | End: 2024-11-12 | Stop reason: HOSPADM

## 2024-11-12 ASSESSMENT — PAIN - FUNCTIONAL ASSESSMENT

## 2024-11-12 ASSESSMENT — PAIN SCALES - GENERAL
PAINLEVEL_OUTOF10: 0 - NO PAIN
PAINLEVEL_OUTOF10: 3
PAINLEVEL_OUTOF10: 0 - NO PAIN
PAINLEVEL_OUTOF10: 8
PAINLEVEL_OUTOF10: 0 - NO PAIN
PAINLEVEL_OUTOF10: 9
PAINLEVEL_OUTOF10: 0 - NO PAIN

## 2024-11-12 ASSESSMENT — COGNITIVE AND FUNCTIONAL STATUS - GENERAL
TOILETING: A LITTLE
DAILY ACTIVITIY SCORE: 22
WALKING IN HOSPITAL ROOM: A LITTLE
DAILY ACTIVITIY SCORE: 23
CLIMB 3 TO 5 STEPS WITH RAILING: A LITTLE
MOBILITY SCORE: 19
DRESSING REGULAR LOWER BODY CLOTHING: A LITTLE
WALKING IN HOSPITAL ROOM: A LITTLE
MOVING TO AND FROM BED TO CHAIR: A LITTLE
HELP NEEDED FOR BATHING: A LITTLE
CLIMB 3 TO 5 STEPS WITH RAILING: A LOT
STANDING UP FROM CHAIR USING ARMS: A LITTLE
MOBILITY SCORE: 21
STANDING UP FROM CHAIR USING ARMS: A LITTLE

## 2024-11-12 NOTE — NURSING NOTE
Transfer report called to RN on floor.  Patient transferred  back to telemetry in stable condition.  Right wrist stable.  No complaints.

## 2024-11-12 NOTE — DISCHARGE INSTRUCTIONS
CARDIAC CATHETERIZATION DISCHARGE INSTRUCTIONS     FOR SUDDEN AND SEVERE CHEST PAIN, SHORTNESS OF BREATH, EXCESSIVE BLEEDING, SIGNS OF STROKE, OR CHANGES IN MENTAL STATUS YOU SHOULD CALL 911 IMMEDIATELY.     If your provider has prescribed aspirin and/or clopidogrel (Plavix), or prasugrel (Effient), or ticagrelor (Brilinta), DO NOT STOP THESE MEDICATIONS for any reason without talking to your cardiologist first. If any of these were prescribed, you must take them every day without missing a single dose. If you are getting low on these medications, contact your provider immediately for a refill.     FOR NEXT 24 HOURS  - Upon discharge, you should return home and rest for the remainder of the day and evening. You do not have to stay on bed rest but should not be very active.  It is recommended a responsible adult be with you for the first 24 hours after the procedure.    - No driving for 24 hours after procedure. Please arrange for someone to drive you home from the hospital today.     - Do not drive, operate machinery, or use power tools for 24 hours after your procedure.     - Do not make any legal decisions for 24 hours after your procedure.     - Do not drink alcoholic beverages for 24 hours after your procedure.    WOUND CARE   *FOR RADIAL (WRIST) ACCESS*  ·      No lifting anything with affected arm, no bending or flexing affected wrist for 24 hours - for example, treat your wrist as if it is sprained.        ·      No lifting greater that 5 pounds with your affected arm for 5 days.  ·      Do not engage in vigorous activities (tennis, golf, bowling, weights) for at least 5 days after the procedure.  ·      Do not submerge the wrist in water for 7 days after the procedure.  ·      You should expect mild tingling in your hand and tenderness at the puncture site for up to 3 days.    - The transparent dressing should be removed from the site 24 hours after the procedure.  Wash the site gently with soap and  water. Rinse well and pat dry. Keep the area clean and dry. You may apply a Band-Aid to the site. Avoid lotions, ointments, or powders until fully healed.     - You may shower the day after your procedure.      - It is normal to notice a small bruise around the puncture site and/or a small grape sized or smaller lump. Any large bruising or large lump warrants a call to the office.     - If bleeding should occur apply pressure to the affected area for 10 minutes.  If the bleeding stops notify your physician.  If there is a large amount of bleeding or spurting of blood CALL 911 immediately.  DO NOT drive yourself to the hospital.    - You may experience some tenderness, bruising or minimal inflammation.  If you have any concerns, you may contact the Cath Lab or if any of these symptoms become excessive, contact your cardiologist or go to the emergency room.     OTHER INSTRUCTIONS  - You may take acetaminophen (Tylenol) as directed for discomfort.  If pain is not relieved with acetaminophen (Tylenol), contact your doctor.    - If you notice or experience any of the following, you should notify your doctor or seek medical attention  Chest pain or discomfort  Change in mental status or weakness in extremities.  Dizziness, light headedness, or feeling faint.  Change in the site where the procedure was performed, such as bleeding or an increased area of bruising or swelling.  Tingling, numbness, pain, or coolness in the leg/arm beyond the site where the procedure was performed.  Signs of infection (i.e. shaking chills, temperature > 100 degrees Fahrenheit, warmth, redness) in the leg/arm area where the procedure was performed.  Changes in urination   Bloody or black stools  Vomiting blood  Severe nose bleeds  Any excessive bleeding    - If you DO NOT have an appointment with your cardiologist within 2-4 weeks following your procedure, please contact their office.

## 2024-11-12 NOTE — PROGRESS NOTES
Anita Calabrese is a 74 y.o. female on day 5 of admission presenting with Hypoxia.      Assessment / Plan        Reason for cardiology consult: Atypical chest pain    73 yo F with PMHx of CAD, HFpEF (echo 3/15/2024 EF 55 to 60%), HTN, TIA, Former 40 pack year smoker, GERD, KAVEH not on cpap, presents with n/v and mid epigastric pain upon waking up in the morning. In addition, she states she has had intermittent mid sternal non radiating chest pressure at rest over the past two days. She has chronic sob and in the ED was found to be hypoxic requiring 2 L NC at rest. She was just discharged from [facility] on 11/3 after being admitted with atypical CP, palpitations, and uncontrolled HTN. She was seen by cardiology, BP regimen adjusted with adding carvedilol, and cleared for discharge. At time of exam she is currently CP free and epigastric pain has spontaneously improved.  Patient's vitals were relatively unremarkable in ED.  Creatinine was elevated at 2.06.  Troponins 9, 8.  CT chest/abd/pelvis grossly unremarkable.  EKG was not remarkable for any acute ischemic changes.      #CAD (extensive coronary calcifications on CT)  #Hypertension  #Rule out atypical chest pain  # Lower abdominal pain  #Rule out ACS  #HFpEF  -Last echo on 3/15/2024 showed EF of 55 to 60%, no regional wall abnormalities, there was impaired relaxation pattern of LV diastolic filling  -Troponins flat at 9, 8  Plan:  -Continue Lipitor 40  -plan for cath on Tuesday 11/12  -Amlodipine 5  -Aspirin 81  -Heparin  -Coreg 25 twice daily  -Holding losartan. Creatinine elevated at 1.08 on 11/11                 Jayant Howard DO   PGY-1, Internal Medicine  This is a preliminary note, please await attending attestation for final A/P    Subjective   Patient underwent left heart catheterization and coronary angiography with me today.  Blood pressure is elevated today.    Objective       Physical Exam:  General:  Pleasant and cooperative. No apparent  "distress.  HEENT:  Normocephalic, atraumatic  Chest:  Clear to auscultation bilaterally. No wheezes, rales, or rhonchi.  CV:  Regular rate and rhythm. No murmurs    Abdomen: Lower abdominal pain  Extremities:  No lower extremity edema or cyanosis.   Neurological:  AAOx3. No focal deficits.  Skin:  Warm and dry.    Last Recorded Vitals  Blood pressure (!) 191/93, pulse 77, temperature 36.5 °C (97.7 °F), temperature source Temporal, resp. rate 18, height 1.676 m (5' 6\"), weight 95.3 kg (210 lb), SpO2 96%.  Intake/Output last 3 Shifts:  I/O last 3 completed shifts:  In: 523.3 (5.5 mL/kg) [I.V.:523.3 (5.5 mL/kg)]  Out: - (0 mL/kg)   Weight: 95.3 kg     Last CBC & BMP  Lab Results   Component Value Date    GLUCOSE 103 (H) 11/12/2024    CALCIUM 8.7 11/12/2024     11/12/2024    K 4.5 11/12/2024    CO2 25 11/12/2024     11/12/2024    BUN 24 (H) 11/12/2024    CREATININE 1.08 (H) 11/12/2024     Lab Results   Component Value Date    WBC 8.2 11/12/2024    HGB 9.5 (L) 11/12/2024    HCT 29.8 (L) 11/12/2024    MCV 96 11/12/2024     11/12/2024 November 9, 2024  CAD (extensive coronary calcifications on CT)  #Hypertension  #Rule out atypical chest pain  # Lower abdominal pain  #Rule out ACS  #HFpEF    Patient creatinine is slowly improving.  Will continue to monitor hemoglobin as well.  Plan for left heart catheterization and coronary angiography on Tuesday as long as clinically improving.  Patient is in agreement.    November 12, 2024  Patient underwent left heart catheterization and coronary angiogram with me today that showed heavy calcification of the coronary arteries but no obstructive coronary artery disease.  This is appropriate for medical therapy.  Will intensify statin with increase of the Lipitor to 80 mg daily.  Will continue with aspirin.  Not sure why losartan was placed on hold.  Will resume that.  Further blood pressure management per primary team and nephrology.  Patient will need follow-up " in cardiology clinic in 4 to 6 weeks for further care.

## 2024-11-12 NOTE — PROGRESS NOTES
NEPHROLOGY PROGRESS NOTE    REASON FOR CONSULT: ALEXUS    SUBJECTIVE:    Patient is seen in the heart cath unit and she had the cardiac catheterization and no intervention was needed.  Blood pressure is elevated currently.  She is NPO.    OBJECTIVE:    Visit Vitals  BP (!) 191/93 (BP Location: Left arm, Patient Position: Lying)   Pulse 77   Temp 36.5 °C (97.7 °F) (Temporal)   Resp 18          Intake/Output Summary (Last 24 hours) at 11/12/2024 1052  Last data filed at 11/12/2024 0942  Gross per 24 hour   Intake 523.33 ml   Output 5 ml   Net 518.33 ml          General: Awake and Alert, In no distress, Cooperative  HEENT: Oral mucosa moist, EOMI  NECK: Supple  CHEST: No crackles, no wheeze, no tachypnea  CVS: S1,S2 heard, no rubs, RRR  ABD: Soft, no tenderness, BS present  EXT: no edema    MEDICATION:    Scheduled medications  amLODIPine, 5 mg, oral, q PM  aspirin, 81 mg, oral, BID  [START ON 11/13/2024] atorvastatin, 80 mg, oral, Daily  carvedilol, 25 mg, oral, BID  famotidine, 20 mg, oral, BID  folic acid, 1 mg, oral, Daily  gabapentin, 600 mg, oral, BID  heparin (porcine), 5,000 Units, subcutaneous, q8h  losartan, 25 mg, oral, Daily  oxygen, , inhalation, Continuous - Inhalation  pantoprazole, 40 mg, intravenous, BID  polyethylene glycol, 17 g, oral, BID  psyllium, 1 packet, oral, Daily      Continuous medications  sodium chloride 0.9%, 100 mL/hr, Last Rate: 100 mL/hr (11/12/24 0445)        PRN medications  PRN medications: acetaminophen, ipratropium-albuteroL, ondansetron, oxyCODONE, oxygen, oxygen     RESULTS:    Lab Results   Component Value Date    WBC 8.2 11/12/2024    HGB 9.5 (L) 11/12/2024    HCT 29.8 (L) 11/12/2024    MCV 96 11/12/2024     11/12/2024        Lab Results   Component Value Date    CREATININE 1.08 (H) 11/12/2024    BUN 24 (H) 11/12/2024     11/12/2024    K 4.5 11/12/2024     11/12/2024    CO2 25 11/12/2024        Radiology Imaging reviewed      ASSESSMENT/PLAN:     1.  ALEXUS:  Renal function has improved.  Creatinine at 1.08.  Peak creatinine was 2.65.  Patient is status post cardiac catheterization.  Patient had sustained contrast associated nephropathy.  Trend renal panel.    2.  Hypertension: Continue Coreg losartan and amlodipine.  Could optimize her losartan dose and introduce HCTZ if renal function is stable.    Thank You very much for allowing me to participate in the care of this Patient    This document was created using dragon dictation and may contain unintended error    Ernie Ames MD   11/12/24

## 2024-11-12 NOTE — PROGRESS NOTES
Anita Calabrese is a 74 y.o. female on day 5 of admission presenting with Hypoxia.      Subjective   S/p [facility] no intervention     Objective     Last Recorded Vitals  BP (!) 191/93 (BP Location: Left arm, Patient Position: Lying)   Pulse 77   Temp 36.5 °C (97.7 °F) (Temporal)   Resp 18   Wt 95.3 kg (210 lb)   SpO2 96%     Physical Exam    G: aox3, NAD, cooperative  HENT: neck supple, no JVD  Eyes: clear sclera  CV: RRR s1 s2  L: clear, no conversational dyspnea, no acc muscle use, no tachypnea  Abd: soft, NT, non distended  Ext: no c/c/e  N: no appreciable acute focal deficits  Psych: appropriate mood and behavior    Assessment/Plan  Acute hypoxic respiratory failure, chronic sob, AECOPD. KAVEH not on CPAP  Atypical CP, CAD  ALEXUS, probable BO  Asymptomatic hypotension - resolved  Worsening anemia (improved and now stable), epigastric pain, GERD  Recent L hip replacement, 10/24 ([facility])  Questionable recent cellulitis around incision site on PO abx     HTN, HLD  RA  DVT ppx  Full code     Plan:  - Renal function improved and stable, [facility] no intervention, medical management, continue coreg, norvasc, give losartan now, follow BP trend, continue statin, ASA (on BID dosing with recent hip replacement)  - Check AM BMP, had sustained BO earlier in admission now with contrast with [facility] today, nephrology following   - Continue scheduled duoneb, off steroids, on RA at time of exam, pulmonary setting up home nebulizer machine, CPAP titration study, ambulatory pulse x  - Intermittent epigastric pain resolved possibly reflux in nature, on BID PPI, also for now will continue home H2 blocker,CT and RUQ US unremarkable, GI following, Hgb stable  - Site around recent hip replacement unremarkable, finished course of PO  - PT/OT evals  - Probable discharge tomorrow       Reinaldo Barajas, DO

## 2024-11-12 NOTE — PROGRESS NOTES
Attempted bedside visit for follow up with dispo planning and confirm plan still for home with resume with [facility]. Patient SANGITA for procedure (Cardiac cath). Will re-attempt as able.   JACKELYN MALDONADO RN [facility]

## 2024-11-12 NOTE — POST-PROCEDURE NOTE
Physician Transition of Care Summary  Invasive Cardiovascular Lab    Procedure Date: 11/12/2024  Attending:    * Los Martin - Primary  Complications:  No in-lab complications observed.     Cardiac Cath Post Procedure Notes:  Post Procedure Diagnosis: Nonobstructive coronary artery disease as above.  Blood Loss:               Estimated blood loss during the procedure was 5 mls.  Specimens Removed:        Number of specimen(s) removed: none.        Recommendations:  Maximize medical therapy.  Agressive risk factor modification efforts.  Follow-up with cardiology clinic.  Lipid lowering agent or Statin therapy.  Medical management of coronary artery disease.  Aspirin therapy.     ____________________________________________________________________________________  CONCLUSIONS:   1. Irregularities in left main with heavy calcification outside the left coronary artery. There is 20% ostial left main stenosis            Luminal irregularities and calcification throughout LAD            Luminal irregularities and calcification throughout the left circumflex            There is calcification in the ostium of the RCA with about 30% stenosis. No pressure dampening. Proximal RCA also has a focal 30 to 40% lesion. Due to heavy calcification, I decided to perform no more injection.            Right dominant coronary system            Severe tortuosity was seen in right subclavian artery.       Please refer to the full report that is in the system.    Electronically signed by: Los Martin MD PhD, 11/12/2024 10:08 AM

## 2024-11-12 NOTE — H&P
History and Physical   Pre Surgical Review (< 30 days)      History & Physical Reviewed    I have reviewed the History and Physical dated:  11/6/24   History and Physical reviewed and relevant findings noted. Patient examined to review pertinent physical  findings.: No significant changes   Home Medications Reviewed: see EMR   Allergies Reviewed: no changes noted      Home Medications  Current Outpatient Medications   Medication Instructions    acetaminophen (Tylenol) 500 mg tablet 1 tablet, oral, Every 6 hours PRN    albuterol 90 mcg/actuation inhaler 2 puffs, inhalation, Every 4 hours PRN    amLODIPine (NORVASC) 5 mg, oral, Daily    aspirin 81 mg, oral, 2 times daily    atorvastatin (LIPITOR) 40 mg, oral, Daily    benzonatate (TESSALON) 100 mg, 3 times daily PRN    carvedilol (COREG) 25 mg, oral, 2 times daily (morning and late afternoon)    docusate sodium (Colace) 100 mg capsule 1 capsule, oral, Every 12 hours scheduled (0630,1830)    famotidine (PEPCID) 20 mg, oral, 2 times daily    folic acid (FOLVITE) 1 mg, oral, Daily    gabapentin (NEURONTIN) 600 mg, oral, 2 times daily    hydroCHLOROthiazide (HYDRODiuril) 25 mg tablet TAKE 2 TABLETS(50 MG) BY MOUTH EVERY DAY    lansoprazole (PREVACID) 30 mg, oral, Daily, Do not crush or chew.    losartan (COZAAR) 100 mg, oral, Daily    naloxone (NARCAN) 4 mg, nasal, As needed, May repeat every 2-3 minutes if needed, alternating nostrils, until medical assistance becomes available.    nitroglycerin (NITROSTAT) 0.4 mg, sublingual, Every 5 min PRN    ondansetron (ZOFRAN) 4 mg, oral, Every 12 hours PRN        Allergies  Allergies   Allergen Reactions    Aspirin Swelling     Okay with 81mg dose    Azithromycin Nausea/vomiting    Corticosteroids (Glucocorticoids) Confusion    Diclofenac Swelling    Penicillins Hives and Swelling    Tetracycline Nausea/vomiting    Tramadol Nausea/vomiting       Physical Exam  Physical Exam  Constitutional:       General: She is not in  acute distress.     Appearance: She is obese.   Cardiovascular:      Rate and Rhythm: Normal rate and regular rhythm.      Comments: + pulses all extremities.  Pulmonary:      Effort: Pulmonary effort is normal. No respiratory distress.      Comments: Clear bilaterally.  Abdominal:      General: Bowel sounds are normal.   Skin:     General: Skin is warm and dry.   Neurological:      General: No focal deficit present.      Mental Status: She is alert and oriented to person, place, and time.   Psychiatric:         Mood and Affect: Mood normal.         Behavior: Behavior normal.          Airway/Sedation Assessment     Assessment by anesthesia N/A     ·  Mouth Opening OK yes      Neck Flexibility OK yes      Loose Teeth No   ·  Oropharyngeal Classification Grade III   ·  ASA PS Classification ASA III - Patient with severe systemic disease       Sedation Plan Moderate     Risks, benefits, and alternatives discussed with patient.     ERAS (Enhanced Recovery After Surgery):  ·  ERAS Patient: No      Consent:   COVID-19 Consent:  ·  COVID-19 Risk Consent Surgeon has reviewed key risks related to the risk of bentley COVID-19 and if they contract COVID-19 what the risks are.     Yue Devine, APRN-CNP

## 2024-11-12 NOTE — CARE PLAN
The patient's goals for the shift include      The clinical goals for the shift include Monitor for pain episode. NPO at midnight      Problem: Skin  Goal: Participates in plan/prevention/treatment measures  Outcome: Progressing     Problem: Bathing  Goal: STG - Patient will bathe body in seated position with set up for ub with supervision  Outcome: Progressing     Problem: Dressings Lower Extremities  Goal: STG - Patient will complete lower body dressing with adaptive equipment and adhereing to precautions with mod I for pants and socks.   Outcome: Progressing     Problem: Toileting  Goal: STG - Patient will complete toileting tasks with sba with lrad including hygiene and clothing management.   Outcome: Progressing     Problem: Pain - Adult  Goal: Verbalizes/displays adequate comfort level or baseline comfort level  Outcome: Progressing     Problem: Safety - Adult  Goal: Free from fall injury  Outcome: Progressing     Problem: Discharge Planning  Goal: Discharge to home or other facility with appropriate resources  Outcome: Progressing     Problem: Chronic Conditions and Co-morbidities  Goal: Patient's chronic conditions and co-morbidity symptoms are monitored and maintained or improved  Outcome: Progressing     Problem: Fall/Injury  Goal: Not fall by end of shift  Outcome: Progressing  Goal: Be free from injury by end of the shift  Outcome: Progressing  Goal: Verbalize understanding of personal risk factors for fall in the hospital  Outcome: Progressing  Goal: Verbalize understanding of risk factor reduction measures to prevent injury from fall in the home  Outcome: Progressing  Goal: Use assistive devices by end of the shift  Outcome: Progressing  Goal: Pace activities to prevent fatigue by end of the shift  Outcome: Progressing     Problem: Pain  Goal: Takes deep breaths with improved pain control throughout the shift  Outcome: Progressing  Goal: Turns in bed with improved pain control throughout the  shift  Outcome: Progressing  Goal: Walks with improved pain control throughout the shift  Outcome: Progressing  Goal: Performs ADL's with improved pain control throughout shift  Outcome: Progressing  Goal: Participates in PT with improved pain control throughout the shift  Outcome: Progressing  Goal: Free from opioid side effects throughout the shift  Outcome: Progressing  Goal: Free from acute confusion related to pain meds throughout the shift  Outcome: Progressing

## 2024-11-13 ENCOUNTER — PHARMACY VISIT (OUTPATIENT)
Dept: PHARMACY | Facility: CLINIC | Age: 74
End: 2024-11-13
Payer: COMMERCIAL

## 2024-11-13 VITALS
HEART RATE: 74 BPM | SYSTOLIC BLOOD PRESSURE: 121 MMHG | HEIGHT: 66 IN | RESPIRATION RATE: 17 BRPM | BODY MASS INDEX: 33.75 KG/M2 | WEIGHT: 210 LBS | OXYGEN SATURATION: 95 % | DIASTOLIC BLOOD PRESSURE: 59 MMHG | TEMPERATURE: 98.1 F

## 2024-11-13 PROBLEM — I20.0 UNSTABLE ANGINA (MULTI): Status: RESOLVED | Noted: 2024-11-06 | Resolved: 2024-11-13

## 2024-11-13 PROBLEM — R09.02 HYPOXIA: Status: RESOLVED | Noted: 2024-11-06 | Resolved: 2024-11-13

## 2024-11-13 LAB
ALBUMIN SERPL BCP-MCNC: 3.1 G/DL (ref 3.4–5)
ANION GAP SERPL CALC-SCNC: 10 MMOL/L (ref 10–20)
BUN SERPL-MCNC: 27 MG/DL (ref 6–23)
CALCIUM SERPL-MCNC: 8.7 MG/DL (ref 8.6–10.3)
CHLORIDE SERPL-SCNC: 108 MMOL/L (ref 98–107)
CO2 SERPL-SCNC: 26 MMOL/L (ref 21–32)
CREAT SERPL-MCNC: 1.14 MG/DL (ref 0.5–1.05)
EGFRCR SERPLBLD CKD-EPI 2021: 50 ML/MIN/1.73M*2
GLUCOSE SERPL-MCNC: 112 MG/DL (ref 74–99)
HOLD SPECIMEN: NORMAL
PHOSPHATE SERPL-MCNC: 3.9 MG/DL (ref 2.5–4.9)
POTASSIUM SERPL-SCNC: 4.3 MMOL/L (ref 3.5–5.3)
SODIUM SERPL-SCNC: 140 MMOL/L (ref 136–145)

## 2024-11-13 PROCEDURE — 99239 HOSP IP/OBS DSCHRG MGMT >30: CPT | Performed by: STUDENT IN AN ORGANIZED HEALTH CARE EDUCATION/TRAINING PROGRAM

## 2024-11-13 PROCEDURE — 2500000005 HC RX 250 GENERAL PHARMACY W/O HCPCS: Performed by: STUDENT IN AN ORGANIZED HEALTH CARE EDUCATION/TRAINING PROGRAM

## 2024-11-13 PROCEDURE — 36415 COLL VENOUS BLD VENIPUNCTURE: CPT | Performed by: NURSE PRACTITIONER

## 2024-11-13 PROCEDURE — 80069 RENAL FUNCTION PANEL: CPT | Performed by: NURSE PRACTITIONER

## 2024-11-13 PROCEDURE — 2500000001 HC RX 250 WO HCPCS SELF ADMINISTERED DRUGS (ALT 637 FOR MEDICARE OP): Performed by: STUDENT IN AN ORGANIZED HEALTH CARE EDUCATION/TRAINING PROGRAM

## 2024-11-13 PROCEDURE — 2500000004 HC RX 250 GENERAL PHARMACY W/ HCPCS (ALT 636 FOR OP/ED): Performed by: NURSE PRACTITIONER

## 2024-11-13 PROCEDURE — 97535 SELF CARE MNGMENT TRAINING: CPT | Mod: GO

## 2024-11-13 PROCEDURE — RXMED WILLOW AMBULATORY MEDICATION CHARGE

## 2024-11-13 PROCEDURE — 2500000001 HC RX 250 WO HCPCS SELF ADMINISTERED DRUGS (ALT 637 FOR MEDICARE OP): Performed by: NURSE PRACTITIONER

## 2024-11-13 PROCEDURE — 97530 THERAPEUTIC ACTIVITIES: CPT | Mod: GP

## 2024-11-13 PROCEDURE — 94761 N-INVAS EAR/PLS OXIMETRY MLT: CPT

## 2024-11-13 PROCEDURE — 99233 SBSQ HOSP IP/OBS HIGH 50: CPT | Performed by: STUDENT IN AN ORGANIZED HEALTH CARE EDUCATION/TRAINING PROGRAM

## 2024-11-13 RX ORDER — LOSARTAN POTASSIUM 100 MG/1
100 TABLET ORAL DAILY
Qty: 30 TABLET | Refills: 1 | Status: SHIPPED | OUTPATIENT
Start: 2024-11-13

## 2024-11-13 RX ORDER — IPRATROPIUM BROMIDE AND ALBUTEROL SULFATE 2.5; .5 MG/3ML; MG/3ML
3 SOLUTION RESPIRATORY (INHALATION) EVERY 4 HOURS PRN
Qty: 180 ML | Refills: 1 | Status: SHIPPED | OUTPATIENT
Start: 2024-11-13

## 2024-11-13 RX ORDER — OXYCODONE HYDROCHLORIDE 5 MG/1
5 TABLET ORAL EVERY 6 HOURS PRN
Qty: 15 TABLET | Refills: 0 | Status: SHIPPED | OUTPATIENT
Start: 2024-11-13

## 2024-11-13 ASSESSMENT — COGNITIVE AND FUNCTIONAL STATUS - GENERAL
CLIMB 3 TO 5 STEPS WITH RAILING: A LITTLE
STANDING UP FROM CHAIR USING ARMS: A LITTLE
DRESSING REGULAR LOWER BODY CLOTHING: A LITTLE
HELP NEEDED FOR BATHING: A LITTLE
WALKING IN HOSPITAL ROOM: A LITTLE
DAILY ACTIVITIY SCORE: 22
WALKING IN HOSPITAL ROOM: A LITTLE
TURNING FROM BACK TO SIDE WHILE IN FLAT BAD: A LITTLE
TOILETING: A LITTLE
MOBILITY SCORE: 19
MOBILITY SCORE: 19
HELP NEEDED FOR BATHING: A LITTLE
DRESSING REGULAR LOWER BODY CLOTHING: A LITTLE
MOVING TO AND FROM BED TO CHAIR: A LITTLE
STANDING UP FROM CHAIR USING ARMS: A LITTLE
DAILY ACTIVITIY SCORE: 21
MOVING TO AND FROM BED TO CHAIR: A LITTLE
CLIMB 3 TO 5 STEPS WITH RAILING: A LOT

## 2024-11-13 ASSESSMENT — PAIN SCALES - GENERAL
PAINLEVEL_OUTOF10: 4
PAINLEVEL_OUTOF10: 0 - NO PAIN

## 2024-11-13 ASSESSMENT — PAIN DESCRIPTION - DESCRIPTORS: DESCRIPTORS: ACHING;DISCOMFORT

## 2024-11-13 ASSESSMENT — PAIN - FUNCTIONAL ASSESSMENT
PAIN_FUNCTIONAL_ASSESSMENT: 0-10
PAIN_FUNCTIONAL_ASSESSMENT: 0-10

## 2024-11-13 ASSESSMENT — ACTIVITIES OF DAILY LIVING (ADL): HOME_MANAGEMENT_TIME_ENTRY: 30

## 2024-11-13 NOTE — PROGRESS NOTES
11/13/24 1009   Discharge Planning   Home or Post Acute Services In home services   Expected Discharge Disposition Home Health     Met with patient at bedside to follow up on discharge plan. Confirmed plan to return [facility] with [facility]. Received update from Jayne with [facility] that patient has been set up with home oxygen. Patient has declined any further home going needs at this time. IMM letter provided to patient at bedside.     Addendum 1450: Patient has written discharge order. Home care orders sent to [facility] and updated on plan for discharge today.

## 2024-11-13 NOTE — PROGRESS NOTES
NEPHROLOGY PROGRESS NOTE    REASON FOR CONSULT: ALEXUS    SUBJECTIVE:    Patient feels a lot better she is eager to go home.  Denies any pain.  No shortness of breath.  Good appetite.    OBJECTIVE:    Visit Vitals  /60 (BP Location: Left arm, Patient Position: Lying)   Pulse 69   Temp 36.2 °C (97.2 °F) (Temporal)   Resp 17        No intake or output data in the 24 hours ending 11/13/24 1105    General: Awake and Alert, In no distress, Cooperative  HEENT: Oral mucosa moist, EOMI  NECK: Supple  CHEST: No crackles, no wheeze, no tachypnea  CVS: S1,S2 heard, no rubs, RRR  ABD: Soft, no tenderness, BS present  EXT: no edema    MEDICATION:    Scheduled medications  amLODIPine, 5 mg, oral, q PM  aspirin, 81 mg, oral, BID  atorvastatin, 80 mg, oral, Daily  carvedilol, 25 mg, oral, BID  famotidine, 20 mg, oral, BID  folic acid, 1 mg, oral, Daily  gabapentin, 600 mg, oral, BID  heparin (porcine), 5,000 Units, subcutaneous, q8h  losartan, 25 mg, oral, Daily  oxygen, , inhalation, Continuous - Inhalation  pantoprazole, 40 mg, intravenous, BID  polyethylene glycol, 17 g, oral, BID  psyllium, 1 packet, oral, Daily      Continuous medications         PRN medications  PRN medications: acetaminophen, ipratropium-albuteroL, ondansetron, oxyCODONE, oxygen, oxygen     RESULTS:    Lab Results   Component Value Date    WBC 8.2 11/12/2024    HGB 9.5 (L) 11/12/2024    HCT 29.8 (L) 11/12/2024    MCV 96 11/12/2024     11/12/2024        Lab Results   Component Value Date    CREATININE 1.14 (H) 11/13/2024    BUN 27 (H) 11/13/2024     11/13/2024    K 4.3 11/13/2024     (H) 11/13/2024    CO2 26 11/13/2024        Radiology Imaging reviewed      ASSESSMENT/PLAN:     1.  ALEXUS: Renal function is stable with creatinine at 1.14.  She did get exposed to contrast yesterday but was given IV normal saline pre and postcontrast exposure.   Peak creatinine was 2.65.  Stable for discharge from nephrology standpoint.    2.  Hypertension:  Continue Coreg losartan and amlodipine.  She can follow-up with her primary physician and get back on her home blood pressure regimen eventually.    Thank You very much for allowing me to participate in the care of this Patient    This document was created using dragon dictation and may contain unintended error    Ernie Ames MD   11/13/24

## 2024-11-13 NOTE — PROGRESS NOTES
Anita Calabrese is a 74 y.o. female on day 6 of admission presenting with Hypoxia.      Assessment / Plan        Reason for cardiology consult: Atypical chest pain    75 yo F with PMHx of CAD, HFpEF (echo 3/15/2024 EF 55 to 60%), HTN, TIA, Former 40 pack year smoker, GERD, KAVEH not on cpap, presents with n/v and mid epigastric pain upon waking up in the morning. In addition, she states she has had intermittent mid sternal non radiating chest pressure at rest over the past two days. She has chronic sob and in the ED was found to be hypoxic requiring 2 L NC at rest. She was just discharged from [facility] on 11/3 after being admitted with atypical CP, palpitations, and uncontrolled HTN. She was seen by cardiology, BP regimen adjusted with adding carvedilol, and cleared for discharge. At time of exam she is currently CP free and epigastric pain has spontaneously improved.  Patient's vitals were relatively unremarkable in ED.  Creatinine was elevated at 2.06.  Troponins 9, 8.  CT chest/abd/pelvis grossly unremarkable.  EKG was not remarkable for any acute ischemic changes.      #CAD (extensive coronary calcifications on CT)  #Hypertension  #Rule out atypical chest pain  # Lower abdominal pain  #Rule out ACS  #HFpEF  -Last echo on 3/15/2024 showed EF of 55 to 60%, no regional wall abnormalities, there was impaired relaxation pattern of LV diastolic filling  -Troponins flat at 9, 8  Plan:  -Continue Lipitor 40  -plan for cath on Tuesday 11/12  -Amlodipine 5  -Aspirin 81  -Heparin  -Coreg 25 twice daily  -Holding losartan. Creatinine elevated at 1.08 on 11/11                 Jayant Howard DO   PGY-1, Internal Medicine  This is a preliminary note, please await attending attestation for final A/P    Subjective   No chest pain or shortness of breath.  Objective       Physical Exam:  General:  Pleasant and cooperative. No apparent distress.  HEENT:  Normocephalic, atraumatic  Chest:  Clear to auscultation bilaterally. No  "wheezes, rales, or rhonchi.  CV:  Regular rate and rhythm. No murmurs.  Good radial access site healing  Abdomen: Lower abdominal pain  Extremities:  No lower extremity edema or cyanosis.   Neurological:  AAOx3. No focal deficits.  Skin:  Warm and dry.    Last Recorded Vitals  Blood pressure 135/60, pulse 69, temperature 36.2 °C (97.2 °F), temperature source Temporal, resp. rate 17, height 1.676 m (5' 6\"), weight 95.3 kg (210 lb), SpO2 92%.  Intake/Output last 3 Shifts:  I/O last 3 completed shifts:  In: 523.3 (5.5 mL/kg) [I.V.:523.3 (5.5 mL/kg)]  Out: 5 (0.1 mL/kg) [Blood:5]  Weight: 95.3 kg     Last CBC & BMP  Lab Results   Component Value Date    GLUCOSE 112 (H) 11/13/2024    CALCIUM 8.7 11/13/2024     11/13/2024    K 4.3 11/13/2024    CO2 26 11/13/2024     (H) 11/13/2024    BUN 27 (H) 11/13/2024    CREATININE 1.14 (H) 11/13/2024     Lab Results   Component Value Date    WBC 8.2 11/12/2024    HGB 9.5 (L) 11/12/2024    HCT 29.8 (L) 11/12/2024    MCV 96 11/12/2024     11/12/2024 November 9, 2024  CAD (extensive coronary calcifications on CT)  #Hypertension  #Rule out atypical chest pain  # Lower abdominal pain  #Rule out ACS  #HFpEF    Patient creatinine is slowly improving.  Will continue to monitor hemoglobin as well.  Plan for left heart catheterization and coronary angiography on Tuesday as long as clinically improving.  Patient is in agreement.    November 12, 2024  Patient underwent left heart catheterization and coronary angiogram with me today that showed heavy calcification of the coronary arteries but no obstructive coronary artery disease.  This is appropriate for medical therapy.  Will intensify statin with increase of the Lipitor to 80 mg daily.  Will continue with aspirin.  Not sure why losartan was placed on hold.  Will resume that.  Further blood pressure management per primary team and nephrology.  Patient will need follow-up in cardiology clinic in 4 to 6 weeks for further " care.    November 13, 2024  Recovered well after her heart catheterization yesterday.  Coronary artery disease appropriate for medical therapy.  Okay to discharge from cardiac standpoint and she will need follow-up in cardiology clinic in 3 to 4 weeks for further care.  Primary team was notified.    Thank you for the consult. We will sign off. Please contact cardiology consult service with any additional questions.

## 2024-11-13 NOTE — CARE PLAN
The patient's goals for the shift include      The clinical goals for the shift include Maintain safety and comfort      Problem: Skin  Goal: Participates in plan/prevention/treatment measures  Outcome: Progressing  Flowsheets (Taken 11/13/2024 1402)  Participates in plan/prevention/treatment measures:   Increase activity/out of bed for meals   Discuss with provider PT/OT consult   Elevate heels

## 2024-11-13 NOTE — DISCHARGE SUMMARY
Discharge Diagnosis  Hypoxia    Discharge Meds     Medication List      START taking these medications     ipratropium-albuteroL 0.5-2.5 mg/3 mL nebulizer solution; Commonly known   as: Duo-Neb; Take 3 mL by nebulization every 4 hours if needed for   wheezing or shortness of breath for up to 120 doses.   oxyCODONE 5 mg immediate release tablet; Commonly known as: Roxicodone;   Take 1 tablet (5 mg) by mouth every 6 hours if needed for moderate pain (4   - 6) or severe pain (7 - 10).     CONTINUE taking these medications     acetaminophen 500 mg tablet; Commonly known as: Tylenol   albuterol 90 mcg/actuation inhaler   amLODIPine 5 mg tablet; Commonly known as: Norvasc; Take 1 tablet (5 mg)   by mouth once daily.   aspirin 81 mg EC tablet   atorvastatin 40 mg tablet; Commonly known as: Lipitor; Take 1 tablet (40   mg) by mouth once daily.   carvedilol 25 mg tablet; Commonly known as: Coreg; Take 1 tablet (25 mg)   by mouth 2 times daily (morning and late afternoon).   docusate sodium 100 mg capsule; Commonly known as: Colace   famotidine 20 mg tablet; Commonly known as: Pepcid; TAKE ONE TABLET BY   MOUTH TWO TIMES A DAY   folic acid 1 mg tablet; Commonly known as: Folvite   gabapentin 600 mg tablet; Commonly known as: Neurontin   lansoprazole 30 mg DR capsule; Commonly known as: Prevacid; Take 1   capsule (30 mg) by mouth once daily. Do not crush or chew.   losartan 100 mg tablet; Commonly known as: Cozaar; Take 1 tablet (100   mg) by mouth once daily.   naloxone 4 mg/0.1 mL nasal spray; Commonly known as: Narcan; Administer   1 spray (4 mg) into affected nostril(s) if needed for opioid reversal. May   repeat every 2-3 minutes if needed, alternating nostrils, until medical   assistance becomes available.   nitroglycerin 0.4 mg SL tablet; Commonly known as: Nitrostat   ondansetron 4 mg tablet; Commonly known as: Zofran     STOP taking these medications     benzonatate 100 mg capsule; Commonly known as: Tessalon    cephalexin 500 mg capsule; Commonly known as: Keflex   cyclobenzaprine 5 mg tablet; Commonly known as: Flexeril   hydroCHLOROthiazide 25 mg tablet; Commonly known as: HYDRODiuril       Hospital Course   75 yo F with PMHx of CAD, HTN, HLD, TIA, GERD, COPD, and KAVEH not on CPAP, and RA presented with atypical CP, sob, and epigastric pain. CT chest/abd pelvis and RUQ unremarkable. Her epigastric pain improved and was thought to be secondary to recent viral gastroenteritis and reflux. GI did see patient during hospitalization. She was treated for an asthma exacerbation initially with scheduled nebs and steroids. Pulmonary did see patient and was able to eventually be weaned to RA and 2 L with activity/night. Home oxygen and a nebulizer were arranged for him. She will have an outpatient sleep study as she likely has KAVEH. Nephrology was consulted for an ALEXUS which was thought to be secondary to a BO. Her renal function improved back to baseline. Cardiology was consulted regarding to her atypical CP and ultimately performed a LHC that showed non obstructive disease that can be medically managed. Pt cleared by all specialists and discharged home with [facility] in stable condition.     35 min    Pertinent Physical Exam At Time of Discharge  Physical Exam    G: aox3, NAD, cooperative  HENT: neck supple, no JVD  Eyes: clear sclera  CV: RRR s1 s2  L: clear, no conversational dyspnea, no acc muscle use, no tachypnea  Abd: soft, NT, non distended  Ext: no c/c/e  N: no appreciable acute focal deficits  Psych: appropriate mood and behavior    Outpatient Follow-Up  Future Appointments   Date Time Provider Department Center   12/11/2024 11:30 AM Kyrie Brand MD [id number] [place]   7/9/2025  1:00 PM Jose Angel Ragland DO [id number] [place]   10/17/2025 12:30 PM Tracy M Schwab, APRN-CNP [id number] [place]         Reinaldo Barajas DO

## 2024-11-13 NOTE — PROGRESS NOTES
Home Oxygen Evaluation        Date/Time SpO2 Medical Gas Therapy Medical Gas Delivery Method Oxygen L/min Patient is on During the Study Patient Activity During Study             11/13/24 0900 96 %  None (Room air)  --  --  At rest     11/13/24 0903 88 %  None (Room air)  --  --  Ambulating     11/13/24 0905 94 %  Supplemental oxygen  Nasal cannula  2 L/min  Ambulating                     Was a Home Oxygen Evaluation Performed? Yes  Based on the Home Oxygen Evaluation, Does the Patient Qualify for Home Oxygen Therapy? Yes    [employer] is the Patient's Preferred DME Company.  Was the DME Company Notified of Home Oxygen Therapy Needs? Yes    Recommendations:       Recommended Oxygen Dose with Activity is 2 L/min

## 2024-11-13 NOTE — PROGRESS NOTES
Physical Therapy    Physical Therapy Treatment    Patient Name: Anita Calabrese  MRN: [id number]  Today's Date: 11/13/2024  Time Calculation  Start Time: 1335  Stop Time: 1357  Time Calculation (min): 22 min     806/806-A    Assessment/Plan   PT Assessment  PT Assessment Results: Decreased mobility, Orthopedic restrictions, Decreased endurance  End of Session Communication: Bedside nurse  End of Session Patient Position: Alarm on, Bed, 2 rail up (call light in reach)     PT Plan  Treatment/Interventions: Bed mobility, Transfer training, Gait training  PT Plan: Ongoing PT  PT Frequency: 3 times per week  PT Discharge Recommendations: Low intensity level of continued care  PT - OK to Discharge: Yes    Current Problem:  Patient Active Problem List   Diagnosis    Rheumatoid arthritis, involving unspecified site, unspecified whether rheumatoid factor present (Multi)    Atherosclerosis of coronary artery    Benign essential hypertension    Cervical spondylosis with radiculopathy    GERD (gastroesophageal reflux disease)    History of colonic polyps    Mixed hyperlipidemia    Osteoarthritis    Pain in both knees    Sleep apnea    Status post reverse total replacement of left shoulder    Trigeminal neuralgia    Vitamin D deficiency    Acute right-sided low back pain without sciatica    Status post right knee replacement    History of TIA (transient ischemic attack)    Dizziness    Shortness of breath    SORIA (dyspnea on exertion)    Pre-operative clearance       General Visit Information:   PT  Visit  PT Received On: 11/13/24  Response to Previous Treatment: Patient with no complaints from previous session.  General  Prior to Session Communication: Bedside nurse  Patient Position Received: Bed, 2 rail up, Alarm off, not on at start of session  Subjective     Precautions:  Precautions  LE Weight Bearing Status: Weight Bearing as Tolerated  Medical Precautions: Fall precautions  Post-Surgical Precautions: Left hip  precautions  Precautions Comment: Fall precautions, L LE WBAT, total hip precautions (MAE on 10/24/24)         Objective     Pain:  Pain Assessment  0-10 (Numeric) Pain Score:  (6/10 pain in LLE; repositioned for pain relief at EOS.)    Cognition:  Cognition  Overall Cognitive Status: Within Functional Limits           Treatments:           Bed Mobility  Bed Mobility:  (completed sup<>sit with SBA, HOB elevated. v/c for sequencing. hip precautions maintained)  Ambulation/Gait Training  Ambulation/Gait Training Performed:  (completed amb 300ft with w/w, CGA, and 2 L of O2. v/c for sequencing and safety)  Transfers  Transfer:  (completed STS from bed and toilet with SBA and w/w.)          Outcome Measures:     [facility] Basic Mobility  Turning from your back to your side while in a flat bed without using bedrails: None  Moving from lying on your back to sitting on the side of a flat bed without using bedrails: A little  Moving to and from bed to chair (including a wheelchair): A little  Standing up from a chair using your arms (e.g. wheelchair or bedside chair): A little  To walk in hospital room: A little  Climbing 3-5 steps with railing: A little  Basic Mobility - Total Score: 19                 Education Documentation  Precautions, taught by Lashon Bingham PT at 11/13/2024  2:19 PM.  Learner: Patient  Readiness: Acceptance  Method: Explanation  Response: Verbalizes Understanding    Mobility Training, taught by Lashon Bingham PT at 11/13/2024  2:19 PM.  Learner: Patient  Readiness: Acceptance  Method: Explanation  Response: Verbalizes Understanding    Education Comments  No comments found.           EDUCATION:     Encounter Problems       Encounter Problems (Active)       PT Problem       STG - Pt will transition supine <> sitting with mod I  (Progressing)       Start:  11/07/24    Expected End:  11/21/24            STG - Pt will transfer STS with mod I  (Progressing)       Start:  11/07/24    Expected End:  11/21/24             STG - Pt will amb 50' using LRD with SUP  (Progressing)       Start:  11/07/24    Expected End:  11/21/24               Pain - Adult

## 2024-11-14 PROBLEM — I10 HYPERTENSIVE DISORDER: Status: ACTIVE | Noted: 2023-02-27

## 2024-11-14 PROBLEM — Z96.642 STATUS POST LEFT HIP REPLACEMENT: Status: ACTIVE | Noted: 2024-10-24

## 2024-11-14 PROBLEM — G89.29 CHRONIC PAIN: Status: ACTIVE | Noted: 2023-02-27

## 2024-11-14 PROBLEM — H26.9 BILATERAL CATARACTS: Status: ACTIVE | Noted: 2024-10-25

## 2024-11-14 PROBLEM — N18.9 CHRONIC KIDNEY DISEASE: Status: ACTIVE | Noted: 2023-03-06

## 2024-11-14 PROBLEM — R00.0 TACHYCARDIA: Status: ACTIVE | Noted: 2024-10-25

## 2024-11-14 PROBLEM — M16.10 HIP ARTHRITIS: Status: ACTIVE | Noted: 2024-02-20

## 2024-11-14 PROBLEM — R41.0 DELIRIUM: Status: ACTIVE | Noted: 2024-10-25

## 2024-11-14 PROBLEM — L40.50 PSORIATIC ARTHRITIS (MULTI): Status: ACTIVE | Noted: 2023-02-27

## 2024-11-14 PROBLEM — M16.0 BILATERAL PRIMARY OSTEOARTHRITIS OF HIP: Status: ACTIVE | Noted: 2024-09-11

## 2024-11-14 PROBLEM — M43.06 LUMBAR SPONDYLOLYSIS: Status: ACTIVE | Noted: 2024-02-20

## 2024-11-14 PROBLEM — J44.9 CHRONIC OBSTRUCTIVE PULMONARY DISEASE (MULTI): Status: ACTIVE | Noted: 2023-02-27

## 2024-11-14 NOTE — PROGRESS NOTES
Occupational Therapy    Occupational Therapy Treatment    Name: Anita Calabrese  MRN: [id number]  Department:   Room: 806/806-A  Date: 11/13/24  Time In: 1419  Time Out:1511    Damaris Smith OTRIGO/L was present throughout this session to supervise this patient's care    Assessment:  OT Assessment: Patient tolerated OT session well. OT session focused on UB bathing, LB dressing with adaptive equipment, functional mobility and education over diaphragmatic breathing/energy conservation technqiues to improve activity tolerence.  Prognosis: Excellent  Evaluation/Treatment Tolerance: Patient tolerated treatment well  Medical Staff Made Aware: Yes  End of Session Communication: Bedside nurse  End of Session Patient Position: Up in chair, Alarm off, not on at start of session, who confirmed that patient is medically stable to participate in this OT session     Plan:  Treatment Interventions: ADL retraining, Functional transfer training, Endurance training, Patient/family training  OT Frequency: 3 times per week  Equipment Recommended upon Discharge:  (LB adaptive equipment for lb dressing.)  OT - OK to Discharge: Yes    Subjective   Previous Visit Info:  OT Last Visit  OT Received On: 11/13/24  General:  General  Prior to Session Communication: Bedside nurse who confirmed that patient is medically stable to participate in this OT session   Patient Position Received: Bed, 2 rail up, Alarm on (sitting edge of bed)  General Comment: Patient agreeable to OT session. Patient cooperative and pleasant throughout however required cueing to mainatin hip precautions during LB ADLs. Noted easily SOB, thus initiated education on energy conservation techniques/diaphragmatic breathing as patient would benefit. Patient practiced proper breathing techniques     Precautions:  LE Weight Bearing Status: Weight Bearing as Tolerated  Medical Precautions: Fall precautions  Post-Surgical Precautions: Left hip precautions  Precautions Comment: MCKAYLA  IV    Pain Assessment:  Pain Assessment  Pain Assessment: 0-10  0-10 (Numeric) Pain Score:  (6 - 7)  Pain Type: Surgical pain  Pain Location: Hip  Pain Orientation: Left  Pain Descriptors: Aching, Discomfort  Pain Frequency: Constant/continuous  Pain Onset: Ongoing  Pain Interventions: Repositioned     Objective   Cognition:  Overall Cognitive Status: Within Functional Limits  Activities of Daily Living: UE Bathing  UE Bathing Level of Assistance:  (SBA)  UE Bathing Where Assessed:  (Recliner)  UE Bathing Comments: washed UB and face using disposable wipes with set up.    LE Dressing  LE Dressing: Yes  Sock Level of Assistance: Minimal verbal cues (SBA)  LE Dressing Where Assessed: Edge of bed  LE Dressing Comments: Able to doff socks dressing stick and don socks with sock aid, using reacher to adjust socks. Cueing to avoid bending past 90 degrees.    Toileting  Toileting Level of Assistance: Contact guard, Minimal verbal cues  Toileting Comments: Washed anterior/posterior perineal area with disposable bath wipes standing using wheeled walker.    Bed Mobility/Transfers:    Transfers  Transfer: Yes  Transfer 1  Transfer From 1: Bed to  Technique 1: Sit to stand, Stand to sit  Transfer Device 1: Walker  Transfer Level of Assistance 1:  (SBA)  Transfers 2  Transfer From 2: Chair with arms to  Technique 2: Sit to stand, Stand to sit  Transfer Device 2: Walker  Transfer Level of Assistance 2:  (SBA)  Functional Mobility:  Functional Mobility  Functional Mobility Performed: Yes  Functional Mobility 1  Device 1: Rolling walker  Assistance 1: Contact guard  Comments 1: ambulated bed <> door  Sitting Balance:  Static Sitting Balance  Static Sitting-Level of Assistance: Independent  Static Sitting-Comment/Number of Minutes: 5 minutes  Standing Balance:  Static Standing Balance  Static Standing-Level of Assistance:  (Mod I to CGA d/t fatigue)  Static Standing-Comment/Number of Minutes: 6 minutes using wheeled walker as support  while completing UB dressing    Outcome Measures:  [facility] Daily Activity  Putting on and taking off regular lower body clothing: A little  Bathing (including washing, rinsing, drying): A little  Putting on and taking off regular upper body clothing: None  Toileting, which includes using toilet, bedpan or urinal: A little  Taking care of personal grooming such as brushing teeth: None  Eating Meals: None  Daily Activity - Total Score: 21    Education Documentation  Handouts, taught by Hannah-Grace Knobloch, S-OT at 11/13/2024  3:55 PM.  Learner: Patient  Readiness: Acceptance  Method: Explanation, Demonstration, Handout  Response: Verbalizes Understanding, Demonstrated Understanding  Comment: provided handouts on energy conservation techniques/diaphragmatic breathing. Educated on hip precautions.    Precautions, taught by Hannah-Grace Knobloch, S-OT at 11/13/2024  3:55 PM.  Learner: Patient  Readiness: Acceptance  Method: Explanation, Demonstration, Handout  Response: Verbalizes Understanding, Demonstrated Understanding  Comment: provided handouts on energy conservation techniques/diaphragmatic breathing. Educated on hip precautions.    Goals:  Encounter Problems       Encounter Problems (Resolved)       Bathing       STG - Patient will bathe body in seated position with set up for ub with supervision (Adequate for Discharge)       Start:  11/07/24    Expected End:  11/21/24    Resolved:  11/13/24            Dressings Lower Extremities       STG - Patient will complete lower body dressing with adaptive equipment and adhereing to precautions with mod I for pants and socks.  (Adequate for Discharge)       Start:  11/07/24    Expected End:  11/21/24    Resolved:  11/13/24            Functional Mobility       LTG - Patient will demonstrate safe mobility requirements with lrad with mod I.  (Adequate for Discharge)       Start:  11/07/24    Expected End:  11/21/24    Resolved:  11/13/24            OT Transfers       STG -  Patient will perform toilet transfer with supervision  (Adequate for Discharge)       Start:  11/07/24    Expected End:  11/21/24    Resolved:  11/13/24            Toileting       STG - Patient will complete toileting tasks with sba with lrad including hygiene and clothing management.  (Adequate for Discharge)       Start:  11/07/24    Expected End:  11/21/24    Resolved:  11/13/24

## 2024-11-15 ENCOUNTER — PATIENT OUTREACH (OUTPATIENT)
Dept: PRIMARY CARE | Facility: CLINIC | Age: 74
End: 2024-11-15
Payer: MEDICARE

## 2024-11-15 LAB
ATRIAL RATE: 88 BPM
P AXIS: 40 DEGREES
P OFFSET: 177 MS
P ONSET: 129 MS
PR INTERVAL: 168 MS
Q ONSET: 213 MS
QRS COUNT: 14 BEATS
QRS DURATION: 86 MS
QT INTERVAL: 388 MS
QTC CALCULATION(BAZETT): 469 MS
QTC FREDERICIA: 441 MS
R AXIS: -8 DEGREES
T AXIS: 53 DEGREES
T OFFSET: 407 MS
VENTRICULAR RATE: 88 BPM

## 2024-11-15 NOTE — PROGRESS NOTES
Discharge Facility: [facility]  Discharge Diagnosis: Hypoxemia  Admission Date: 11/6/2024 (readmit)  Discharge Date: 11/13/2024    PCP Appointment Date:  -Not scheduled d/t no contact; task sent to office to assist with follow up    Hospital Encounter and Summary Linked: Yes    Unable to reach patient x2 attempts, voicemail left with call back number.

## 2024-11-29 ENCOUNTER — PATIENT OUTREACH (OUTPATIENT)
Dept: PRIMARY CARE | Facility: CLINIC | Age: 74
End: 2024-11-29
Payer: MEDICARE

## 2024-12-11 ENCOUNTER — APPOINTMENT (OUTPATIENT)
Dept: CARDIOLOGY | Facility: CLINIC | Age: 74
End: 2024-12-11
Payer: MEDICARE

## 2024-12-17 DIAGNOSIS — K21.9 GASTROESOPHAGEAL REFLUX DISEASE WITHOUT ESOPHAGITIS: ICD-10-CM

## 2024-12-17 RX ORDER — FAMOTIDINE 20 MG/1
20 TABLET, FILM COATED ORAL 2 TIMES DAILY
Qty: 60 TABLET | Refills: 0 | Status: SHIPPED | OUTPATIENT
Start: 2024-12-17

## 2024-12-30 ENCOUNTER — TELEPHONE (OUTPATIENT)
Dept: PRIMARY CARE | Facility: CLINIC | Age: 74
End: 2024-12-30
Payer: MEDICARE

## 2024-12-30 VITALS — SYSTOLIC BLOOD PRESSURE: 132 MMHG | DIASTOLIC BLOOD PRESSURE: 56 MMHG

## 2024-12-30 NOTE — TELEPHONE ENCOUNTER
----- Message from Jose Angel Ragland sent at 12/27/2024 11:52 PM EST -----  Pt's BP is controlled-print off and put in    Pt last BP reading at [facility] was controlled on 12/23 at 11:30 am was 132/56

## 2025-01-07 ENCOUNTER — APPOINTMENT (OUTPATIENT)
Dept: RESPIRATORY THERAPY | Facility: HOSPITAL | Age: 75
End: 2025-01-07
Payer: MEDICARE

## 2025-01-08 NOTE — ED PROVIDER NOTES
History of Present Illness     History provided by: Patient  Limitations to History: None  External Records Reviewed with Brief Summary: Discharge Summary from 11/3/24 which showed hospitalization for heart palpitations    HPI:  Anita Calabrese is a 74 y.o. female with a PMHx of HTN, HLD, CAD, and GERD who presents with vomiting. Patient notes that she has been having generalized malaise starting this morning. She reports nausea and vomiting. She has some epigastric pain as well. She reports having shortness of breath and some chest discomfort. No fevers. No dysuria or polyuria.     Physical Exam   Triage vitals:  T 36.3 °C (97.3 °F)  HR 88  /76  RR (!) 24  O2 (!) 88 % None (Room air)    General: Well appearing and in no acute distress.  HEENT: NCAT. PERRL. Oropharynx pink and moist.  CV: Regular rate, regular rhythm.   Resp: Normal effort.   GI: Soft. Mild midepigastric TTP.  Neuro: Moving all extremities bilaterally.  Psych: Appropriate mood and affect    Medical Decision Making & ED Course   Medical Decision Making:  This is a 74 y.o. female with a PMHx of HTN, HLD, CAD, and GERD who presents with vomiting. Patient noting having abdominal pain and vomiting starting this morning. She was recently hospitalized and discharged. She had an overall benign physical exam. EKG was nonischemic. Labs here showing a mild ALEXUS. Unremarkable CBC. Pt was signed out pending results of CT imaging.  ----    Differential diagnoses considered include but are not limited to: gastritis, cholecystitis, pe, acs, pna, colitis     Social Determinants of Health which Significantly Impact Care: None identified     EKG Independent Interpretation:  EKG interpreted by me shows normal sinus rhythm with a normal axis, normal intervals, and no acute ischemic changes.    Independent Result Review and Interpretation: Relevant laboratory and radiographic results were reviewed and independently interpreted by myself.  As necessary, they  are commented on in the ED Course.    Chronic conditions affecting the patient's care: As documented above in MDM    The patient was discussed with the following consultants/services: None    Care Considerations: As documented above in MDM    ED Course:  ED Course as of 01/08/25 1340   Wed Nov 06, 2024   1529   ECG read interpreted by me.  Normal sinus rhythm, [CG]      ED Course User Index  [CG] Sally Zhu MD         Diagnoses as of 01/08/25 1340   Hypoxia     Disposition   Patient was signed out to Dr. Morales at 0700 pending completion of their work-up.  Please see the next provider's transition of care note for the remainder of the patient's care.     Jose Angel Portillo MD  Emergency Medicine     Jose Angel Portillo MD  01/08/25 3092

## 2025-04-01 DIAGNOSIS — R06.02 SHORTNESS OF BREATH: ICD-10-CM

## 2025-04-01 DIAGNOSIS — R13.19 ESOPHAGEAL DYSPHAGIA: ICD-10-CM

## 2025-04-02 RX ORDER — ATORVASTATIN CALCIUM 40 MG/1
40 TABLET, FILM COATED ORAL DAILY
Qty: 90 TABLET | Refills: 1 | Status: SHIPPED | OUTPATIENT
Start: 2025-04-02 | End: 2026-04-02

## 2025-04-02 RX ORDER — LANSOPRAZOLE 30 MG/1
30 CAPSULE, DELAYED RELEASE ORAL DAILY
Qty: 90 CAPSULE | Refills: 0 | Status: SHIPPED | OUTPATIENT
Start: 2025-04-02

## 2025-06-28 DIAGNOSIS — R13.19 ESOPHAGEAL DYSPHAGIA: ICD-10-CM

## 2025-06-30 RX ORDER — LANSOPRAZOLE 30 MG/1
30 CAPSULE, DELAYED RELEASE ORAL DAILY
Qty: 90 CAPSULE | Refills: 0 | OUTPATIENT
Start: 2025-06-30

## 2025-07-09 ENCOUNTER — APPOINTMENT (OUTPATIENT)
Dept: PRIMARY CARE | Facility: CLINIC | Age: 75
End: 2025-07-09
Payer: MEDICARE

## 2025-08-08 ENCOUNTER — APPOINTMENT (OUTPATIENT)
Dept: PRIMARY CARE | Facility: CLINIC | Age: 75
End: 2025-08-08
Payer: MEDICARE

## 2025-08-19 ENCOUNTER — APPOINTMENT (OUTPATIENT)
Dept: CARDIOLOGY | Facility: CLINIC | Age: 75
End: 2025-08-19
Payer: MEDICARE

## 2025-09-19 ENCOUNTER — APPOINTMENT (OUTPATIENT)
Dept: PRIMARY CARE | Facility: CLINIC | Age: 75
End: 2025-09-19
Payer: MEDICARE

## 2025-10-17 ENCOUNTER — APPOINTMENT (OUTPATIENT)
Dept: CARDIOLOGY | Facility: CLINIC | Age: 75
End: 2025-10-17
Payer: MEDICARE

## (undated) DEVICE — Device

## (undated) DEVICE — CATHETER, OPTITORQUE, 5FR, TIG, 1H/100CM

## (undated) DEVICE — GUIDEWIRE, INQWIRE, 3MM J, .035 X 210CM, FIXED

## (undated) DEVICE — ELECTRODE, MULTIFUNCTION, QUICK-COMBO, EDGE SYSTEM, 2 FT

## (undated) DEVICE — KIT, STANDARD, LEFT HEART, WITH MANIFOLD

## (undated) DEVICE — SHEATH, GLIDESHEATH, SLENDER, 6FR 10CM

## (undated) DEVICE — GUIDEWIRE, WHOLEY, 0.035 IN X 145 CM, STRAIGHT/SHAPEABLE TIP

## (undated) DEVICE — TR BAND, RADIAL COMPRESSION, STANDARD, 24CM